# Patient Record
Sex: MALE | Race: WHITE | HISPANIC OR LATINO | Employment: FULL TIME | URBAN - METROPOLITAN AREA
[De-identification: names, ages, dates, MRNs, and addresses within clinical notes are randomized per-mention and may not be internally consistent; named-entity substitution may affect disease eponyms.]

---

## 2017-10-16 ENCOUNTER — NON-PROVIDER VISIT (OUTPATIENT)
Dept: MEDICAL GROUP | Facility: PHYSICIAN GROUP | Age: 47
End: 2017-10-16
Payer: COMMERCIAL

## 2017-10-16 DIAGNOSIS — Z23 NEED FOR INFLUENZA VACCINATION: ICD-10-CM

## 2017-10-16 PROCEDURE — 90471 IMMUNIZATION ADMIN: CPT | Performed by: FAMILY MEDICINE

## 2017-10-16 PROCEDURE — 90686 IIV4 VACC NO PRSV 0.5 ML IM: CPT | Performed by: FAMILY MEDICINE

## 2017-10-16 NOTE — NON-PROVIDER
"Jamie Bosch Jr. is a 47 y.o. male here for a non-provider visit for:   FLU    Reason for immunization: Annual Flu Vaccine  Immunization records indicate need for vaccine: Yes, confirmed with [place] [employer]  Minimum interval has been met for this vaccine: Yes  ABN completed: No    Order and dose verified by:   VIS Dated  08/07/15  was given to patient: Yes  All IAC Questionnaire questions were answered \"No.\"    Patient tolerated injection and no adverse effects were observed or reported: Yes    Pt scheduled for next dose in series: No    "

## 2018-03-05 ENCOUNTER — OFFICE VISIT (OUTPATIENT)
Dept: MEDICAL GROUP | Facility: PHYSICIAN GROUP | Age: 48
End: 2018-03-05
Payer: COMMERCIAL

## 2018-03-05 ENCOUNTER — HOSPITAL ENCOUNTER (OUTPATIENT)
Dept: LAB | Facility: MEDICAL CENTER | Age: 48
End: 2018-03-05
Attending: INTERNAL MEDICINE
Payer: COMMERCIAL

## 2018-03-05 VITALS
HEIGHT: 70 IN | RESPIRATION RATE: 16 BRPM | TEMPERATURE: 97.9 F | BODY MASS INDEX: 28.37 KG/M2 | HEART RATE: 85 BPM | SYSTOLIC BLOOD PRESSURE: 110 MMHG | OXYGEN SATURATION: 96 % | DIASTOLIC BLOOD PRESSURE: 80 MMHG | WEIGHT: 198.2 LBS

## 2018-03-05 DIAGNOSIS — R12 HEART BURN: ICD-10-CM

## 2018-03-05 PROCEDURE — 99213 OFFICE O/P EST LOW 20 MIN: CPT | Performed by: INTERNAL MEDICINE

## 2018-03-05 ASSESSMENT — PATIENT HEALTH QUESTIONNAIRE - PHQ9: CLINICAL INTERPRETATION OF PHQ2 SCORE: 0

## 2018-03-05 NOTE — ASSESSMENT & PLAN NOTE
Has had long standing issues with heart burn for the past 2 months. Has epigastric abdominal pain and the heart burn keeps him up at night. He was on prilosec in the past but just came off of it since he was on it for a long time. Since coming off of prilosec a while ago (doesn't recall when) he was doing well initially. Did take a trip to [place] a year ago and is wondering about H. Pylori. Did have an upper endoscopy about 3-4 years ago and per patient report the study didn't show up. Has a history of duodenal ulcer in 1997. He has been trying to eat smaller meals with more water with some slight relief. Eats dinner at 7 pm and is in bed by 9:30 pm. Doesn't elevate the head of his head. His symptoms are definitely worse at night. Wakes up at midnight nearly every night. He has been taking activated charcoal without any difference. He has been trying to eat lighter dinners. Denies nausea, vomiting, blood in stool or urine.

## 2018-03-05 NOTE — PROGRESS NOTES
"PRIMARY CARE CLINIC FOLLOW UP VISIT  Chief Complaint   Patient presents with   • Heartburn     x 2 months    • RUQ Pain     post gallbladder removal     History of Present Illness     Heart burn  Has had long standing issues with heart burn for the past 2 months. Has epigastric abdominal pain and the heart burn keeps him up at night. He was on prilosec in the past but just came off of it since he was on it for a long time. Since coming off of prilosec a while ago (doesn't recall when) he was doing well initially. Did take a trip to [place] a year ago and is wondering about H. Pylori. Did have an upper endoscopy about 3-4 years ago and per patient report the study didn't show up. Has a history of duodenal ulcer in 1997. He has been trying to eat smaller meals with more water with some slight relief. Eats dinner at 7 pm and is in bed by 9:30 pm. Doesn't elevate the head of his head. His symptoms are definitely worse at night. Wakes up at midnight nearly every night. He has been taking activated charcoal without any difference. He has been trying to eat lighter dinners. Denies nausea, vomiting, blood in stool or urine.     Current Outpatient Prescriptions   Medication Sig Dispense Refill   • Melatonin 5 MG Cap Take  by mouth.     • Misc Natural Products (LUTEIN 20 PO) Take  by mouth.     • Charcoal Activated (CHARCOAL PO) Take  by mouth.     • GREEN COFFEE BEAN PO Take  by mouth.       No current facility-administered medications for this visit.      Past Medical History:   Diagnosis Date   • Bowel habit changes     \"loose stools\" chronic    • GERD (gastroesophageal reflux disease)    • Heart burn    • High cholesterol    • History of duodenal ulcer    • Indigestion      Past Surgical History:   Procedure Laterality Date   • CHARLIE BY LAPAROSCOPY  9/1/2016    Procedure: CHARLIE BY LAPAROSCOPY;  Surgeon: Mike Banda M.D.;  Location: SURGERY [facility];  Service:    • UMBILICAL HERNIA REPAIR  9/1/2016    " "Procedure: UMBILICAL HERNIA REPAIR;  Surgeon: Mike Banda M.D.;  Location: SURGERY [facility];  Service:    • APPENDECTOMY LAPAROSCOPIC N/A 7/23/2015    Procedure: APPENDECTOMY LAPAROSCOPIC;  Surgeon: Mike Banda M.D.;  Location: SURGERY [facility];  Service:    • LIPOMA EXCISION  age 6   • PB REPAIR PERF DUOD/MOLLY ULC-WND/INJ  age 27     Social History   Substance Use Topics   • Smoking status: Former Smoker     Packs/day: 0.20     Types: Cigarettes     Quit date: 1/1/1996   • Smokeless tobacco: Never Used   • Alcohol use 0.0 oz/week      Comment: 1 drink per week      Social History     Social History Narrative   • No narrative on file     Family History   Problem Relation Age of Onset   • Heart Disease Father    • GI Maternal Grandmother      cirrhosis - non-alcoholic   • Diabetes Paternal Grandmother      Family Status   Relation Status   • Father    • Maternal Grandmother    • Paternal Grandmother      Allergies: Neomycin    ROS  As per HPI above. All other systems reviewed and negative.        Objective   Blood pressure 110/80, pulse 85, temperature 36.6 °C (97.9 °F), resp. rate 16, height 1.778 m (5' 10\"), weight 89.9 kg (198 lb 3.2 oz), SpO2 96 %. Body mass index is 28.44 kg/m².    General: alert and oriented, pleasant, cooperative  HEENT: Normocephalic, atraumatic.   Cardiovascular: regular rate and rhythm, normal S1/S2  Pulmonary: lungs clear to auscultation bilaterally  Gastrointestinal: no tenderness to palpation. No hepatosplenomegaly. Bowel sounds normoactive  Skin: warm and dry, no lesions or rashes  Psychiatric: appropriate mood and affect. Good insight and appropriate judgment     Assessment and Plan   The following treatment plan was discussed     1. Heart burn  Counseled patient to space dinner and bedtime out by 3 hours. Instructed him to start omeprazole 20 mg 30 minutes before breakfast for 4-6 weeks and make appointment with GI after that course is complete for possible " repeat endoscopy. Counseled him to pay attention to particular food triggers (he notes bread).   - REFERRAL TO GASTROENTEROLOGY  - H. PYLORI AB, IGM    Healthcare maintenance     There are no preventive care reminders to display for this patient.    Return in about 2 months (around 5/5/2018).    Eric Pereira MD  Internal Medicine  [facility]

## 2018-03-05 NOTE — PATIENT INSTRUCTIONS
· Try over the counter omeprazole 20 mg (taken 30 minutes before breakfast) for 4-6 weeks  · Make an appointment with GI for once your 4-6 weeks of omeprazole is complete to discuss possible endoscopy with them

## 2018-05-29 ENCOUNTER — TELEPHONE (OUTPATIENT)
Dept: MEDICAL GROUP | Facility: PHYSICIAN GROUP | Age: 48
End: 2018-05-29

## 2018-05-29 ENCOUNTER — OFFICE VISIT (OUTPATIENT)
Dept: MEDICAL GROUP | Facility: PHYSICIAN GROUP | Age: 48
End: 2018-05-29
Payer: COMMERCIAL

## 2018-05-29 ENCOUNTER — HOSPITAL ENCOUNTER (OUTPATIENT)
Dept: RADIOLOGY | Facility: MEDICAL CENTER | Age: 48
End: 2018-05-29
Attending: FAMILY MEDICINE
Payer: COMMERCIAL

## 2018-05-29 VITALS
OXYGEN SATURATION: 99 % | RESPIRATION RATE: 16 BRPM | DIASTOLIC BLOOD PRESSURE: 78 MMHG | WEIGHT: 190 LBS | HEART RATE: 92 BPM | SYSTOLIC BLOOD PRESSURE: 112 MMHG | HEIGHT: 70 IN | BODY MASS INDEX: 27.2 KG/M2 | TEMPERATURE: 97.4 F

## 2018-05-29 DIAGNOSIS — M54.16 LUMBAR BACK PAIN WITH RADICULOPATHY AFFECTING RIGHT LOWER EXTREMITY: ICD-10-CM

## 2018-05-29 PROCEDURE — 99214 OFFICE O/P EST MOD 30 MIN: CPT | Performed by: FAMILY MEDICINE

## 2018-05-29 PROCEDURE — 72100 X-RAY EXAM L-S SPINE 2/3 VWS: CPT

## 2018-05-29 RX ORDER — CELECOXIB 200 MG/1
200 CAPSULE ORAL 2 TIMES DAILY
Qty: 60 CAP | Refills: 0 | Status: SHIPPED | OUTPATIENT
Start: 2018-05-29 | End: 2018-07-10 | Stop reason: SDUPTHER

## 2018-05-29 NOTE — TELEPHONE ENCOUNTER
----- Message from Amy Ahmadi M.D. sent at 5/29/2018  9:49 AM PDT -----  Lots of arthritis and some misalignment.  Will await MRI

## 2018-05-29 NOTE — PROGRESS NOTES
"Chief Complaint   Patient presents with   • Back Pain     lower middle back pain x \"years\"       HISTORY OF PRESENT ILLNESS: Patient is a 48 y.o. male established patient here today for the following concerns:    1. Lumbar back pain with radiculopathy affecting right lower extremity  This is a new problem.  Reports years of lower back pain described as aching and sharp in nature with radiation down primarily the right leg made worse by bending forward.  Reports that he has previously done 10 sessions of PT and continues to do his home exercises, but despite this it is worsening.   Now feels occasionally that the right leg is getting weaker.  Denies any injuries or accidents.  Not taking any OTC relievers.        Past Medical, Social, and Family history reviewed and updated in EPIC    Allergies:Neomycin    Current Outpatient Prescriptions   Medication Sig Dispense Refill   • celecoxib (CELEBREX) 200 MG Cap Take 1 Cap by mouth 2 times a day for 30 days. 60 Cap 0   • Misc Natural Products (LUTEIN 20 PO) Take  by mouth.     • Melatonin 5 MG Cap Take  by mouth.     • Charcoal Activated (CHARCOAL PO) Take  by mouth.     • GREEN COFFEE BEAN PO Take  by mouth.       No current facility-administered medications for this visit.          ROS:  Review of Systems   Constitutional: Negative for fever, chills, weight loss and malaise/fatigue.   HENT: Negative for ear pain, nosebleeds, congestion, sore throat and neck pain.    Eyes: Negative for blurred vision.   Respiratory: Negative for cough, sputum production, shortness of breath and wheezing.    Cardiovascular: Negative for chest pain, palpitations,  and leg swelling.   Gastrointestinal: Negative for heartburn, nausea, vomiting, diarrhea and abdominal pain.   Genitourinary: Negative for dysuria, urgency and frequency.   Musculoskeletal: Negative for myalgias, back pain and joint pain.   Skin: Negative for rash and itching.   Neurological: Negative for dizziness, tingling, " "tremors, sensory change, focal weakness and headaches.   Endo/Heme/Allergies: Does not bruise/bleed easily.   Psychiatric/Behavioral: Negative for depression, anxiety, suicidal ideas, insomnia and memory loss.      Exam:  Blood pressure 112/78, pulse 92, temperature 36.3 °C (97.4 °F), resp. rate 16, height 1.778 m (5' 10\"), weight 86.2 kg (190 lb), SpO2 99 %.    General:  Well nourished, well developed in NAD  Head is grossly normal.  Neck: Supple without JVD   Pulmonary:  Normal effort.   Cardiovascular: Regular rate  Extremities: no clubbing, cyanosis, or edema.  Psych: affect appropriate  MSK: no midline tenderness, mild tenderness over the right paraspinous musculature, S/I area.  Negative straight leg testing.      Please note that this dictation was created using voice recognition software. I have made every reasonable attempt to correct obvious errors, but I expect that there are errors of grammar and possibly content that I did not discover before finalizing the note.    Assessment/Plan:  1. Lumbar back pain with radiculopathy affecting right lower extremity  Suspect facet arthropathy causing some of the sciatica L4-5, S1 on the right.  Possibly some SI joint dysfunction.  Will start celebrex for the next month scheduled.  Enlist physiatry for evaluation and given the duration and lack of response to PT will get updated imaging.   - DX-LUMBAR SPINE-2 OR 3 VIEWS; Future  - MR-LUMBAR SPINE-W/O; Future  - REFERRAL TO PHYSIATRY (PMR)  - celecoxib (CELEBREX) 200 MG Cap; Take 1 Cap by mouth 2 times a day for 30 days.  Dispense: 60 Cap; Refill: 0            "

## 2018-05-31 ENCOUNTER — HOSPITAL ENCOUNTER (OUTPATIENT)
Dept: RADIOLOGY | Facility: MEDICAL CENTER | Age: 48
End: 2018-05-31
Attending: FAMILY MEDICINE
Payer: COMMERCIAL

## 2018-05-31 DIAGNOSIS — M54.16 LUMBAR BACK PAIN WITH RADICULOPATHY AFFECTING RIGHT LOWER EXTREMITY: ICD-10-CM

## 2018-05-31 PROCEDURE — 72148 MRI LUMBAR SPINE W/O DYE: CPT

## 2018-06-05 ENCOUNTER — TELEPHONE (OUTPATIENT)
Dept: MEDICAL GROUP | Facility: PHYSICIAN GROUP | Age: 48
End: 2018-06-05

## 2018-06-05 NOTE — TELEPHONE ENCOUNTER
Phone Number Called: [phone] (home)     Message: patient informed    Left Message for patient to call back: no

## 2018-06-05 NOTE — TELEPHONE ENCOUNTER
----- Message from Amy Ahmadi M.D. sent at 6/5/2018  1:02 PM PDT -----  There is narrowing at the L5-S1 areas where the nerves exit.  I'd like to have a physiatry consultation.  Looks like he is already scheduled for 6/21

## 2018-06-21 ENCOUNTER — HOSPITAL ENCOUNTER (OUTPATIENT)
Dept: RADIOLOGY | Facility: MEDICAL CENTER | Age: 48
End: 2018-06-21
Attending: PHYSICAL MEDICINE & REHABILITATION
Payer: COMMERCIAL

## 2018-06-21 ENCOUNTER — OFFICE VISIT (OUTPATIENT)
Dept: PHYSICAL MEDICINE AND REHAB | Facility: MEDICAL CENTER | Age: 48
End: 2018-06-21
Payer: COMMERCIAL

## 2018-06-21 VITALS
DIASTOLIC BLOOD PRESSURE: 86 MMHG | HEART RATE: 78 BPM | WEIGHT: 192 LBS | SYSTOLIC BLOOD PRESSURE: 128 MMHG | OXYGEN SATURATION: 96 % | BODY MASS INDEX: 27.49 KG/M2 | TEMPERATURE: 97.8 F | HEIGHT: 70 IN

## 2018-06-21 DIAGNOSIS — M48.061 FORAMINAL STENOSIS OF LUMBAR REGION: ICD-10-CM

## 2018-06-21 DIAGNOSIS — M43.17 SPONDYLOLISTHESIS OF LUMBOSACRAL REGION: ICD-10-CM

## 2018-06-21 DIAGNOSIS — M47.817 SPONDYLOSIS OF LUMBOSACRAL JOINT: ICD-10-CM

## 2018-06-21 DIAGNOSIS — M47.816 LUMBAR SPONDYLOSIS: ICD-10-CM

## 2018-06-21 PROCEDURE — 72100 X-RAY EXAM L-S SPINE 2/3 VWS: CPT

## 2018-06-21 PROCEDURE — 99205 OFFICE O/P NEW HI 60 MIN: CPT | Performed by: PHYSICAL MEDICINE & REHABILITATION

## 2018-06-21 RX ORDER — PANTOPRAZOLE SODIUM 20 MG/1
TABLET, DELAYED RELEASE ORAL
COMMUNITY
Start: 2018-06-10 | End: 2019-02-20

## 2018-06-21 ASSESSMENT — PAIN SCALES - GENERAL: PAINLEVEL: 4=SLIGHT-MODERATE PAIN

## 2018-06-21 NOTE — PROGRESS NOTES
"New patient note    Physiatry (physical medicine and  Rehabilitation), interventional spine and sports medicine    Date of Service: 6/21/2018    Chief complaint: Low back pain    HISTORY    HPI: Jamie Bosch Jr. 48 y.o. male who presents today for evaluation of his low back and gluteal region pain.  No symptoms that radiate below the gluteal region.  No weakness in the legs.  Morning, he has more stiffness.  This sometimes improves with the day and other times does not.      Celebrex seems to be helpful.  This has been going on for years and was becoming more constant.  This seems to be more intermittent than previously.     Symptoms are worse bending forward.  He does have some relief with bending backward.  Pain is worse with coughing and sneezing makes his symptoms worse.     He does some stretches.  One, a scissor-legged standing toe touch.  He has used an inversion table, not sure if this helps and he has a limited tolerance of hanging upside down, maybe two minutes.    Medical records review:  I reviewed the note from the referring provider Amy Ahmadi M.D. Dated 05/29/2018     Previous treatments:    Physical Therapy: Yes    Medications the patient is tried: NSAIDs, tylenol in the past for about 2 months    Previous interventions: none    Previous surgeries to relieve the above pain:  none      ROS:   GI: h/o duodenal ulcer, appendectomy, cholecystectomy    Red Flags ROS:   Fever, Chills, Sweats: Denies  Involuntary Weight Loss: Denies  Bladder Incontinence: Denies  Bowel Incontinence: denies  Saddle Anesthesia: Denies    All other systems reviewed and negative.       PMHx:   Past Medical History:   Diagnosis Date   • Bowel habit changes     \"loose stools\" chronic    • GERD (gastroesophageal reflux disease)    • Heart burn    • High cholesterol    • History of duodenal ulcer    • Indigestion        PSHx:   Past Surgical History:   Procedure Laterality Date   • CHARLIE BY LAPAROSCOPY  9/1/2016    Procedure: " CHARLIE BY LAPAROSCOPY;  Surgeon: Mike Banda M.D.;  Location: SURGERY [facility];  Service:    • UMBILICAL HERNIA REPAIR  9/1/2016    Procedure: UMBILICAL HERNIA REPAIR;  Surgeon: Mike Banda M.D.;  Location: SURGERY [facility];  Service:    • APPENDECTOMY LAPAROSCOPIC N/A 7/23/2015    Procedure: APPENDECTOMY LAPAROSCOPIC;  Surgeon: Mike Banda M.D.;  Location: SURGERY [facility];  Service:    • LIPOMA EXCISION  age 6   • PB REPAIR PERF DUOD/MOLLY ULC-WND/INJ  age 27       Family history   Family History   Problem Relation Age of Onset   • Heart Disease Father    • GI Maternal Grandmother      cirrhosis - non-alcoholic   • Diabetes Paternal Grandmother        Medications:   Current Outpatient Prescriptions   Medication   • pantoprazole (PROTONIX) 20 MG tablet   • celecoxib (CELEBREX) 200 MG Cap   • Misc Natural Products (LUTEIN 20 PO)   • GREEN COFFEE BEAN PO   • Melatonin 5 MG Cap   • Charcoal Activated (CHARCOAL PO)     No current facility-administered medications for this visit.        Allergies:   Allergies   Allergen Reactions   • Neomycin Swelling       Social Hx:   Social History     Social History   • Marital status:      Spouse name: N/A   • Number of children: N/A   • Years of education: N/A     Occupational History   • Not on file.     Social History Main Topics   • Smoking status: Former Smoker     Packs/day: 0.20     Types: Cigarettes     Quit date: 1/1/1996   • Smokeless tobacco: Never Used   • Alcohol use 0.0 oz/week      Comment: 2 drink per week    • Drug use: No   • Sexual activity: Yes     Other Topics Concern   •  Service No   • Blood Transfusions Yes   • Caffeine Concern No   • Occupational Exposure Yes   • Hobby Hazards No   • Sleep Concern No   • Stress Concern No   • Weight Concern No   • Special Diet No   • Back Care Yes   • Exercise Yes   • Bike Helmet Yes   • Seat Belt Yes   • Self-Exams Yes     Social History Narrative   • No narrative on file  "        EXAMINATION     Physical Exam:   Vitals: Blood pressure 128/86, pulse 78, temperature 36.6 °C (97.8 °F), height 1.778 m (5' 10\"), weight 87.1 kg (192 lb), SpO2 96 %.    Constitutional:   Body Habitus: Body mass index is 27.55 kg/m².  Cooperation: Fully cooperates with exam  Appearance: Well-groomed, well-nourished, not disheveled, in no acute distress    Eyes: No scleral icterus to suggest severe liver disease, no proptosis to suggest severe hyperthyroid    ENT -no obvious auditory deficits, no facial droop     Skin -no rashes or lesions noted     Respiratory-  breathing comfortable on room air, no audible wheezing    Cardiovascular- No lower extremity edema is noted.     Psychiatric- alert and oriented ×3. Normal affect.     Gait - normal gait, no use of ambulatory device, nonantalgic. the patient can toe walk with ease. the patient can heel walk with ease.    Musculoskeletal -   Cervical spine   Inspection: No deformities of the skin over the cervical spine. No rashes or lesions.    full  A/P ROM in all directions, without  pain      No signs of muscular atrophy in bilateral upper extremities     Thoracic/Lumbar Spine/Sacral Spine/Hips   Inspection: No evidence of atrophy in bilateral lower extremities throughout     ROM: full  AROM with flexion, extension, lateral flexion, and rotation bilaterally, with pain slightly relieved pain on the right with lateral side bending to the left.  Extension and rotation on the right is uncomfortable.    Palpation:   No tenderness to palpation in midline at T1-T12 levels. No tenderness to palpation in the left and right of the midline T1-L5  palpation over SI joint: negative bilaterally    palpation over buttock: negative bilaterally    palpation in hip or over the greater trochanters: negative bilaterally      Lumbar spine Special tests  Neuro tension  Straight leg test negative bilaterally      Note of hamstring tightness bilaterally, mild hip flexor contractions, " quadriceps are flexible.      HIP  FAIR test negative bilaterally for pain, but reveals tightness   Range of motion in the hips is within normal limits in internal rotation, external rotation.     SI joint tests  Observation patient sits on one buttocks: Negative  SUE test negative bilaterally       Neuro       Key points for the international standards for neurological classification of spinal cord injury (ISNCSCI) to light touch.     Dermatome R L   C4 2  2   C5 2 2   C6 2 2   C7 2 2   C8 2 2   T1 2 2   T2 2 2   L2 2 2   L3 2 2   L4 2 2   L5 2 2   S1 2 2   S2 2 2           Motor Exam Upper Extremities   ? Myotome R L   Shoulder flexion C5  5 5   Elbow flexion C5 5 5   Wrist extension C6 5 5   Elbow extension C7 5 5   Finger flexion C8 5 5   Finger abduction T1 5 5         Motor Exam Lower Extremities    ? Myotome R L   Hip flexion L2  5 5   Knee extension L3 5 5   Ankle dorsiflexion L4 5 5   Toe extension L5 5 5   Ankle plantarflexion S1 5 5       Sloan’s sign negative bilaterally   Babinski sign negative bilaterally   Clonus of the ankle negative bilaterally     Reflexes  ?  R L   Biceps  2+  2+   Brachioradialis  2+ 2+   Patella  2+ 2+   Achilles   2+ 2+       MEDICAL DECISION MAKING    Medical records review: see under HPI section.     DATA    Labs:   Lab Results   Component Value Date/Time    SODIUM 134 (L) 08/22/2016 11:48 AM    POTASSIUM 3.9 08/22/2016 11:48 AM    CHLORIDE 101 08/22/2016 11:48 AM    CO2 26 08/22/2016 11:48 AM    ANION 7.0 08/22/2016 11:48 AM    GLUCOSE 114 (H) 08/22/2016 11:48 AM    BUN 17 08/22/2016 11:48 AM    CREATININE 1.29 08/22/2016 11:48 AM    CALCIUM 10.0 08/22/2016 11:48 AM    ASTSGOT 18 06/08/2016 10:27 AM    ALTSGPT 40 06/08/2016 10:27 AM    TBILIRUBIN 1.0 06/08/2016 10:27 AM    ALBUMIN 4.5 06/08/2016 10:27 AM    TOTPROTEIN 7.5 06/08/2016 10:27 AM    GLOBULIN 3.4 03/08/2016 11:42 AM    AGRATIO 1.3 03/08/2016 11:42 AM        Lab Results   Component Value Date/Time    WBC 13.6  (H) 08/22/2016 11:48 AM    RBC 4.98 08/22/2016 11:48 AM    HEMOGLOBIN 14.6 08/22/2016 11:48 AM    HEMATOCRIT 43.3 08/22/2016 11:48 AM    MCV 86.9 08/22/2016 11:48 AM    MCH 29.3 08/22/2016 11:48 AM    MCHC 33.7 08/22/2016 11:48 AM    MPV 11.3 08/22/2016 11:48 AM    NEUTSPOLYS 79.10 (H) 07/24/2015 03:17 AM    LYMPHOCYTES 8.40 (L) 07/24/2015 03:17 AM    MONOCYTES 11.10 07/24/2015 03:17 AM    EOSINOPHILS 0.70 07/24/2015 03:17 AM    BASOPHILS 0.10 07/24/2015 03:17 AM    HYPOCHROMIA 1+ 01/09/2013 12:00 PM        Lab Results   Component Value Date/Time    HBA1C 5.7 (H) 03/08/2016 11:42 AM        Imaging: I personally reviewed following images, these are my reads  Xray lumbar spine 05/29/2018  There are degenerative changes noted in the lumbar spine.  There is decreased disc height at L5-1, particularly.  There is note of retrolisthesis at L5-S1, L4-5, L3-4, and L2-3.     MRI lumbar spine 05/31/2018  Reviewed this and the xrays with Mr. Bosch at the time of the visit.  Moderate foraminal narrowing at L5-S1 with diffuse disc bulge at this level.  Disc desiccation is noted in the lower three levels of the lumbar spine.    Facet arthropathy and ligamentum flavum thickening at L4-5, L5-S1 and L3-4, lesser at L2-3 and L1-2      IMAGING radiology reads. I reviewed the following radiology reads                                          Results for orders placed during the hospital encounter of 05/31/18   MR-LUMBAR SPINE-W/O    Impression Moderate degenerative change of the lumbar spine. No spinal canal narrowing.    Moderate neuroforaminal narrowing at L5-S1 bilaterally.                                                                                                   Results for orders placed during the hospital encounter of 05/29/18   DX-LUMBAR SPINE-2 OR 3 VIEWS    Impression Worsening degenerative change of the lumbar spine.    Minimal retrolisthesis of L2-3, L3-4, L4-5 and L5-S1, slightly more than prior.                                            Diagnosis   Visit Diagnoses     ICD-10-CM   1. Spondylolisthesis of lumbosacral region M43.17   2. Lumbar spondylosis M47.816   3. Spondylosis of lumbosacral joint M47.817   4. Foraminal stenosis of lumbar region M99.83           ASSESSMENT:  Jamie Bosch Jr. 48 y.o. male presents for evaluation of his lumbar spine pain.     Jamie was seen today for new patient.    Diagnoses and all orders for this visit:    Spondylolisthesis of lumbosacral region  -     Cancel: DX-LUMBAR SPINE-4+ VIEWS  -     DX-LUMBAR SPINE-2 OR 3 VIEWS    Lumbar spondylosis  -     REFERRAL TO PHYSIATRY (PMR)    Spondylosis of lumbosacral joint  -     REFERRAL TO PHYSIATRY (PMR)    Foraminal stenosis of lumbar region    Lumbar spondylosis noted. This facet arthropathy could account for his symptoms.  We discussed that he also has foraminal stenosis.  Given this, we discussed risks, benefits and expectations of performing right L3-5 medial branch blocks.  Discussed diagnostic and therapeutic blocks. If this is successful, will progress to a second block as needed and possibly rhizotomy.  If not successful, we will consider transforaminal epidural steroid injections.  He is agreeable.    We discussed findings on his imaging studies.  Check flexion and extension to assess the retrolisthesis and stability.  Given the foraminal stenosis on MRI, this could contribute to his symptoms.    Reviewed his stretches.  Advised that he stop the standing stretch and reviewed hamstring stretches, lumbar spinal twists as well as prone stretches to add to his routine.  Inversion table could be beneficial at a less extreme angle.    Discussed trial of turmeric up to 1500mg/day in divided doses.  Advised he look for USP or GMP label and that this would need to be stopped prior to injections.      Follow-up:For injections and 2 week followup    Thank you very much for asking me to participate in Jamie Bosch's care.  Please contact me with any  questions or concerns    Earl Levin MD  Physical Medicine and Rehabilitation  Interventional Spine and Sports Physiatry  [facility]        CC Amy Ahmadi M.D.

## 2018-06-22 ENCOUNTER — TELEPHONE (OUTPATIENT)
Dept: PHYSICAL MEDICINE AND REHAB | Facility: MEDICAL CENTER | Age: 48
End: 2018-06-22

## 2018-07-10 DIAGNOSIS — M54.16 LUMBAR BACK PAIN WITH RADICULOPATHY AFFECTING RIGHT LOWER EXTREMITY: ICD-10-CM

## 2018-07-11 ENCOUNTER — TELEPHONE (OUTPATIENT)
Dept: MEDICAL GROUP | Facility: PHYSICIAN GROUP | Age: 48
End: 2018-07-11

## 2018-07-11 RX ORDER — CELECOXIB 200 MG/1
200 CAPSULE ORAL 2 TIMES DAILY
Qty: 60 CAP | Refills: 0 | Status: SHIPPED | OUTPATIENT
Start: 2018-07-11 | End: 2018-08-10 | Stop reason: SDUPTHER

## 2018-07-12 NOTE — TELEPHONE ENCOUNTER
DOCUMENTATION OF PAR STATUS:    1. Name of Medication & Dose: celecoxib     2. Name of Prescription Coverage Company & phone #: [employer]    3. Date Prior Auth Submitted: 07/11/18    4. What information was given to obtain insurance decision? Cover My Meds    5. Prior Auth Status? Pending    6. Patient Notified: no

## 2018-07-12 NOTE — TELEPHONE ENCOUNTER
FINAL PRIOR AUTHORIZATION STATUS:    1.  Name of Medication & Dose: celecoxib 200 mg caps     2. Prior Auth Status: Approved through 07/31/2019     3. Action Taken: Pharmacy Notified: yes Patient Notified: no

## 2018-07-24 NOTE — PROGRESS NOTES
PAT note: PAT call made 07/24/2018 at 1110. Spoke with Jamie. Health history, allergies, medications and pre-procedure instructions reviewed with patient.Pt verbalized understanding of instructions. Pt requesting sedation for this procedure.

## 2018-07-25 ENCOUNTER — HOSPITAL ENCOUNTER (OUTPATIENT)
Dept: RADIOLOGY | Facility: REHABILITATION | Age: 48
End: 2018-07-25
Attending: PHYSICAL MEDICINE & REHABILITATION
Payer: COMMERCIAL

## 2018-07-25 ENCOUNTER — HOSPITAL ENCOUNTER (OUTPATIENT)
Dept: PAIN MANAGEMENT | Facility: REHABILITATION | Age: 48
End: 2018-07-25
Attending: PHYSICAL MEDICINE & REHABILITATION
Payer: COMMERCIAL

## 2018-07-25 VITALS
OXYGEN SATURATION: 98 % | DIASTOLIC BLOOD PRESSURE: 86 MMHG | HEART RATE: 71 BPM | RESPIRATION RATE: 16 BRPM | SYSTOLIC BLOOD PRESSURE: 126 MMHG | TEMPERATURE: 98.1 F | HEIGHT: 71 IN | WEIGHT: 190.7 LBS | BODY MASS INDEX: 26.7 KG/M2

## 2018-07-25 PROCEDURE — 64493 INJ PARAVERT F JNT L/S 1 LEV: CPT

## 2018-07-25 PROCEDURE — 700117 HCHG RX CONTRAST REV CODE 255

## 2018-07-25 PROCEDURE — 64494 INJ PARAVERT F JNT L/S 2 LEV: CPT

## 2018-07-25 PROCEDURE — 700111 HCHG RX REV CODE 636 W/ 250 OVERRIDE (IP)

## 2018-07-25 PROCEDURE — 700101 HCHG RX REV CODE 250

## 2018-07-25 RX ORDER — LIDOCAINE HYDROCHLORIDE 20 MG/ML
INJECTION, SOLUTION EPIDURAL; INFILTRATION; INTRACAUDAL; PERINEURAL
Status: COMPLETED
Start: 2018-07-25 | End: 2018-07-25

## 2018-07-25 RX ORDER — METHYLPREDNISOLONE ACETATE 40 MG/ML
INJECTION, SUSPENSION INTRA-ARTICULAR; INTRALESIONAL; INTRAMUSCULAR; SOFT TISSUE
Status: COMPLETED
Start: 2018-07-25 | End: 2018-07-25

## 2018-07-25 RX ADMIN — LIDOCAINE HYDROCHLORIDE 4 ML: 20 INJECTION, SOLUTION EPIDURAL; INFILTRATION; INTRACAUDAL; PERINEURAL at 08:07

## 2018-07-25 RX ADMIN — METHYLPREDNISOLONE ACETATE 40 MG: 40 INJECTION, SUSPENSION INTRA-ARTICULAR; INTRALESIONAL; INTRAMUSCULAR; SOFT TISSUE at 08:07

## 2018-07-25 RX ADMIN — IOHEXOL 2 ML: 240 INJECTION, SOLUTION INTRATHECAL; INTRAVASCULAR; INTRAVENOUS; ORAL at 08:05

## 2018-07-25 ASSESSMENT — PAIN SCALES - GENERAL
PAINLEVEL_OUTOF10: 0
PAINLEVEL_OUTOF10: 3
PAINLEVEL_OUTOF10: 0

## 2018-07-25 NOTE — PROCEDURES
Date of Service: 7/25/2018    Physician/s: Earl Levin MD    Pre-operative Diagnosis: Lumbar spondylosis, spondylosis of lumbosacral joint    Post-operative Diagnosis: Lumbar spondylosis, spondylosis of lumbosacral joint     Procedure: Medial Branch Blocks targeting the right lumbar three and lumbar four medial branch blocks and right lumbar five dorsal ramus block    Description of procedure:    The risks, benefits, and alternatives of the procedure were reviewed and discussed with the patient.  Written informed consent was freely obtained. A pre-procedural time-out was conducted by the physician verifying patient’s identity, procedure to be performed, procedure site and side, and allergy verification. Appropriate equipment was determined to be in place for the procedure.       In the fluoroscopy suite the patient was placed in a prone position and the skin was prepped and draped in the usual sterile fashion. The fluoroscope was placed over the lower back at the appropriate angles, and the targets for injection were marked. A 25g needle was placed into each of the markings at the three levels, and approx 1cc of 1% Lidocaine was injected subcutaneously into the epidermal and dermal layers. The needle was removed. A 25g 3.5 inch needle was then placed at the intersection of the transverse process and superior articular process at the right lumbar five dorsal ramus nerve  The needle tips were then verified by oblique view, less than 1 cc of contrast dye was used to highlight the medial branch while the fluoroscope was running live. Following negative aspiration, approx 1.25cc of solution containing 1.0cc depomedrol (40mg) and 3.0cc of 2% lidocaine was then injected at the above levels, and the needles were removed intact.     A 25g 3.5 inch needle was then placed at the intersection of the transverse process and superior articular process at the right lumbar four medial branch location.  The needle tips were then  verified by oblique view, less than 1 cc of contrast dye was used to highlight the medial branch while the fluoroscope was running live. Following negative aspiration, approx 1.25cc of solution containing 1.0cc depomedrol (40mg) and 3.0cc of 2% lidocaine was then injected at the above levels, and the needles were removed intact.    A 25g 3.5 inch needle was then placed at the intersection of the transverse process and superior articular process at the right lumbar three medial branch location.  The needle tips were then verified by oblique view, less than 1 cc of contrast dye was used to highlight the medial branch while the fluoroscope was running live. Following negative aspiration, approx 1.25cc of solution containing 1.0cc depomedrol (40mg) and 3.0cc of 2% lidocaine was then injected at the above levels, and the needles were removed intact.    The patient's back was covered with a 4x4 gauze, the area was cleansed with sterile normal saline, and a dressing was applied.     There were no complications noted, the patient was hemodynamically stable, and tolerated the procedure well.   He reports pain has decreased and we will send him out with a sheet to track symptoms today.    Earl Levin MD  Physical Medicine and Rehabilitation  Interventional Spine and Sports Physiatry  [facility]  7/25/2018  8:12 AM

## 2018-07-25 NOTE — H&P
"Physiatry (physical medicine and  Rehabilitation), interventional spine and sports medicine     Date of Service: 07/25/2018     Chief complaint: Low back pain     HISTORY     HPI: Jamie Bosch Jr. 48 y.o. male who presents today for evaluation of his low back and gluteal region pain.  No symptoms that radiate below the gluteal region.  No weakness in the legs.  Morning, he has more stiffness.  This sometimes improves with the day and other times does not.       Celebrex seems to be helpful.  This has been going on for years and was becoming more constant.  This seems to be more intermittent than previously.     Symptoms are worse bending forward.  He does have some relief with bending backward.  Pain is worse with coughing and sneezing makes his symptoms worse.     He does some stretches.  One, a scissor-legged standing toe touch.  He has used an inversion table, not sure if this helps and he has a limited tolerance of hanging upside down, maybe two minutes.    Pain is a 3/10 on the VAS.     Medical records review:  I reviewed the note from the referring provider Amy Ahmadi M.D. Dated 05/29/2018      Previous treatments:     Physical Therapy: Yes     Medications the patient is tried: NSAIDs, tylenol in the past for about 2 months     Previous interventions: none     Previous surgeries to relieve the above pain:  none     ROS:   GI: h/o duodenal ulcer, appendectomy, cholecystectomy     Red Flags ROS:   Fever, Chills, Sweats: Denies  Involuntary Weight Loss: Denies  Bladder Incontinence: Denies  Bowel Incontinence: denies  Saddle Anesthesia: Denies     All other systems reviewed and negative.        PMHx:   Past Medical History        Past Medical History:   Diagnosis Date   • Bowel habit changes       \"loose stools\" chronic    • GERD (gastroesophageal reflux disease)     • Heart burn     • High cholesterol     • History of duodenal ulcer     • Indigestion              PSHx:   Past Surgical History         Past " Surgical History:   Procedure Laterality Date   • CHARLIE BY LAPAROSCOPY   9/1/2016     Procedure: CHARLIE BY LAPAROSCOPY;  Surgeon: Mike Banda M.D.;  Location: SURGERY [facility];  Service:    • UMBILICAL HERNIA REPAIR   9/1/2016     Procedure: UMBILICAL HERNIA REPAIR;  Surgeon: Mike Banda M.D.;  Location: SURGERY [facility];  Service:    • APPENDECTOMY LAPAROSCOPIC N/A 7/23/2015     Procedure: APPENDECTOMY LAPAROSCOPIC;  Surgeon: Mike Banda M.D.;  Location: SURGERY [facility];  Service:    • LIPOMA EXCISION   age 6   • PB REPAIR PERF DUOD/MOLLY ULC-WND/INJ   age 27            Family history   Family History          Family History   Problem Relation Age of Onset   • Heart Disease Father     • GI Maternal Grandmother         cirrhosis - non-alcoholic   • Diabetes Paternal Grandmother              Medications:       Current Outpatient Prescriptions   Medication   • pantoprazole (PROTONIX) 20 MG tablet   • celecoxib (CELEBREX) 200 MG Cap   • Misc Natural Products (LUTEIN 20 PO)   • GREEN COFFEE BEAN PO   • Melatonin 5 MG Cap   • Charcoal Activated (CHARCOAL PO)      No current facility-administered medications for this visit.          Allergies:        Allergies   Allergen Reactions   • Neomycin Swelling         Social Hx:   Social History   Social History            Social History   • Marital status:        Spouse name: N/A   • Number of children: N/A   • Years of education: N/A          Occupational History   • Not on file.             Social History Main Topics   • Smoking status: Former Smoker       Packs/day: 0.20       Types: Cigarettes       Quit date: 1/1/1996   • Smokeless tobacco: Never Used   • Alcohol use 0.0 oz/week         Comment: 2 drink per week    • Drug use: No   • Sexual activity: Yes           Other Topics Concern   •  Service No   • Blood Transfusions Yes   • Caffeine Concern No   • Occupational Exposure Yes   • Hobby Hazards No   • Sleep Concern No    • Stress Concern No   • Weight Concern No   • Special Diet No   • Back Care Yes   • Exercise Yes   • Bike Helmet Yes   • Seat Belt Yes   • Self-Exams Yes          Social History Narrative   • No narrative on file               EXAMINATION      Physical Exam:   Vitals: Blood pressure 124/87, pulse 62, temperature  98°F, SpO2 98 %.     Constitutional:   Body Habitus: Body mass index is 27.55 kg/m².  Cooperation: Fully cooperates with exam  Appearance: Well-groomed, well-nourished, not disheveled, in no acute distress     Eyes: No scleral icterus to suggest severe liver disease, no proptosis to suggest severe hyperthyroid     ENT -no obvious auditory deficits, no facial droop      Skin -no rashes or lesions noted      Respiratory-  breathing comfortable on room air, no audible wheezing CTA Bilaterally     Cardiovascular- No lower extremity edema is noted. CV: RRR, nl S1, S2      Psychiatric- alert and oriented ×3. Normal affect.      Gait - normal gait, no use of ambulatory device, nonantalgic. the patient can toe walk with ease. the patient can heel walk with ease.     Musculoskeletal -   Cervical spine   Inspection: No deformities of the skin over the cervical spine. No rashes or lesions.     full  A/P ROM in all directions, without  pain       No signs of muscular atrophy in bilateral upper extremities      Thoracic/Lumbar Spine/Sacral Spine/Hips   Inspection: No evidence of atrophy in bilateral lower extremities throughout      ROM: full  AROM with flexion, extension, lateral flexion, and rotation bilaterally, with pain slightly relieved pain on the right with lateral side bending to the left.  Extension and rotation on the right is uncomfortable.     Palpation:   No tenderness to palpation in midline at T1-T12 levels. No tenderness to palpation in the left and right of the midline T1-L5  palpation over SI joint: negative bilaterally    palpation over buttock: negative bilaterally    palpation in hip or over the  greater trochanters: negative bilaterally       Lumbar spine Special tests  Neuro tension  Straight leg test negative bilaterally       Note of hamstring tightness bilaterally, mild hip flexor contractions, quadriceps are flexible.       HIP  FAIR test negative bilaterally for pain, but reveals tightness   Range of motion in the hips is within normal limits in internal rotation, external rotation.      SI joint tests  Observation patient sits on one buttocks: Negative  SUE test negative bilaterally         Neuro         Key points for the international standards for neurological classification of spinal cord injury (ISNCSCI) to light touch.      Dermatome R L   C4 2  2   C5 2 2   C6 2 2   C7 2 2   C8 2 2   T1 2 2   T2 2 2   L2 2 2   L3 2 2   L4 2 2   L5 2 2   S1 2 2   S2 2 2               Motor Exam Upper Extremities   ? Myotome R L   Shoulder flexion C5  5 5   Elbow flexion C5 5 5   Wrist extension C6 5 5   Elbow extension C7 5 5   Finger flexion C8 5 5   Finger abduction T1 5 5            Motor Exam Lower Extremities     ? Myotome R L   Hip flexion L2  5 5   Knee extension L3 5 5   Ankle dorsiflexion L4 5 5   Toe extension L5 5 5   Ankle plantarflexion S1 5 5        Sloan’s sign negative bilaterally   Babinski sign negative bilaterally   Clonus of the ankle negative bilaterally      Reflexes  ?   R L   Biceps   2+  2+   Brachioradialis   2+ 2+   Patella   2+ 2+   Achilles    2+ 2+         MEDICAL DECISION MAKING     Medical records review: see under HPI section.      DATA     Labs:         Lab Results   Component Value Date/Time     SODIUM 134 (L) 08/22/2016 11:48 AM     POTASSIUM 3.9 08/22/2016 11:48 AM     CHLORIDE 101 08/22/2016 11:48 AM     CO2 26 08/22/2016 11:48 AM     ANION 7.0 08/22/2016 11:48 AM     GLUCOSE 114 (H) 08/22/2016 11:48 AM     BUN 17 08/22/2016 11:48 AM     CREATININE 1.29 08/22/2016 11:48 AM     CALCIUM 10.0 08/22/2016 11:48 AM     ASTSGOT 18 06/08/2016 10:27 AM     ALTSGPT 40 06/08/2016  10:27 AM     TBILIRUBIN 1.0 06/08/2016 10:27 AM     ALBUMIN 4.5 06/08/2016 10:27 AM     TOTPROTEIN 7.5 06/08/2016 10:27 AM     GLOBULIN 3.4 03/08/2016 11:42 AM     AGRATIO 1.3 03/08/2016 11:42 AM               Lab Results   Component Value Date/Time     WBC 13.6 (H) 08/22/2016 11:48 AM     RBC 4.98 08/22/2016 11:48 AM     HEMOGLOBIN 14.6 08/22/2016 11:48 AM     HEMATOCRIT 43.3 08/22/2016 11:48 AM     MCV 86.9 08/22/2016 11:48 AM     MCH 29.3 08/22/2016 11:48 AM     MCHC 33.7 08/22/2016 11:48 AM     MPV 11.3 08/22/2016 11:48 AM     NEUTSPOLYS 79.10 (H) 07/24/2015 03:17 AM     LYMPHOCYTES 8.40 (L) 07/24/2015 03:17 AM     MONOCYTES 11.10 07/24/2015 03:17 AM     EOSINOPHILS 0.70 07/24/2015 03:17 AM     BASOPHILS 0.10 07/24/2015 03:17 AM     HYPOCHROMIA 1+ 01/09/2013 12:00 PM               Lab Results   Component Value Date/Time     HBA1C 5.7 (H) 03/08/2016 11:42 AM         Imaging: I personally reviewed following images, these are my reads  Xray lumbar spine 05/29/2018  There are degenerative changes noted in the lumbar spine.  There is decreased disc height at L5-1, particularly.  There is note of retrolisthesis at L5-S1, L4-5, L3-4, and L2-3.      MRI lumbar spine 05/31/2018  Reviewed this and the xrays with Mr. Bosch at the time of the visit.  Moderate foraminal narrowing at L5-S1 with diffuse disc bulge at this level.  Disc desiccation is noted in the lower three levels of the lumbar spine.     Facet arthropathy and ligamentum flavum thickening at L4-5, L5-S1 and L3-4, lesser at L2-3 and L1-2        IMAGING radiology reads. I reviewed the following radiology reads                                               Results for orders placed during the hospital encounter of 05/31/18   MR-LUMBAR SPINE-W/O     Impression Moderate degenerative change of the lumbar spine. No spinal canal narrowing.     Moderate neuroforaminal narrowing at L5-S1 bilaterally.                                                                                                         Results for orders placed during the hospital encounter of 05/29/18   DX-LUMBAR SPINE-2 OR 3 VIEWS     Impression Worsening degenerative change of the lumbar spine.     Minimal retrolisthesis of L2-3, L3-4, L4-5 and L5-S1, slightly more than prior.                                            Diagnosis        Visit Diagnoses       ICD-10-CM   1. Spondylolisthesis of lumbosacral region M43.17   2. Lumbar spondylosis M47.816   3. Spondylosis of lumbosacral joint M47.817   4. Foraminal stenosis of lumbar region M99.83               ASSESSMENT:  Jamie Bosch Jr. 48 y.o. male presents for evaluation of his lumbar spine pain.     Jamie was seen today for new patient.     Diagnoses and all orders for this visit:     Spondylolisthesis of lumbosacral region  -     Cancel: DX-LUMBAR SPINE-4+ VIEWS  -     DX-LUMBAR SPINE-2 OR 3 VIEWS     Lumbar spondylosis  -     REFERRAL TO PHYSIATRY (PMR)     Spondylosis of lumbosacral joint  -     REFERRAL TO PHYSIATRY (PMR)     Foraminal stenosis of lumbar region     Lumbar spondylosis noted. This facet arthropathy could account for his symptoms.  We discussed that he also has foraminal stenosis.  Given this, we discussed risks, benefits and expectations of performing right L3-5 medial branch blocks.  Discussed diagnostic and therapeutic blocks. If this is successful, will progress to a second block as needed and possibly rhizotomy.  If not successful, we will consider transforaminal epidural steroid injections.  He is agreeable.     We discussed findings on his imaging studies.  Check flexion and extension to assess the retrolisthesis and stability.  Given the foraminal stenosis on MRI, this could contribute to his symptoms.     Reviewed his stretches.  Advised that he stop the standing stretch and reviewed hamstring stretches, lumbar spinal twists as well as prone stretches to add to his routine.  Inversion table could be beneficial at a less extreme  miri.     Discussed trial of turmeric up to 1500mg/day in divided doses.  Advised he look for USP or GMP label and that this would need to be stopped prior to injections.     H&P updated on day of procedure 07/25/2018.  Plan to proceed with right L3-5 medial branch blocks.     Follow-up:For injections and 2 week followup     Thank you very much for asking me to participate in Jamie Danitza's care.  Please contact me with any questions or concerns     Earl Levin MD  Physical Medicine and Rehabilitation  Interventional Spine and Sports Physiatry  [facility]

## 2018-07-25 NOTE — PROGRESS NOTES
Current meds. See medication reconciliation form. Reviewed with pt. Pt denies taking ASA,other blood thinners or anti-inflammatories. Pt has a ride post-procedure (wife, Jessica is ). Printed and verbal discharge instructions given to pt who verbalized understanding.

## 2018-07-25 NOTE — PROGRESS NOTES
Timeout : allergies, pertinent medical history, site & procedure. Positioned patient by VI RN, X - ray Tech. Both lower legs & feet pillow placed for support. Hands supported on stool under head of the bed. Procedure tolerated well by patient. Accompanied to recovery room, ambulatory.

## 2018-08-07 ENCOUNTER — OFFICE VISIT (OUTPATIENT)
Dept: PHYSICAL MEDICINE AND REHAB | Facility: MEDICAL CENTER | Age: 48
End: 2018-08-07
Payer: COMMERCIAL

## 2018-08-07 VITALS
HEART RATE: 82 BPM | WEIGHT: 192.68 LBS | BODY MASS INDEX: 26.98 KG/M2 | OXYGEN SATURATION: 96 % | HEIGHT: 71 IN | SYSTOLIC BLOOD PRESSURE: 110 MMHG | TEMPERATURE: 97.6 F | DIASTOLIC BLOOD PRESSURE: 80 MMHG

## 2018-08-07 DIAGNOSIS — M43.17 SPONDYLOLISTHESIS OF LUMBOSACRAL REGION: ICD-10-CM

## 2018-08-07 DIAGNOSIS — M54.50 RIGHT-SIDED LOW BACK PAIN WITHOUT SCIATICA, UNSPECIFIED CHRONICITY: ICD-10-CM

## 2018-08-07 DIAGNOSIS — M47.816 LUMBAR SPONDYLOSIS: ICD-10-CM

## 2018-08-07 DIAGNOSIS — M47.817 SPONDYLOSIS OF LUMBOSACRAL JOINT: ICD-10-CM

## 2018-08-07 PROCEDURE — 99213 OFFICE O/P EST LOW 20 MIN: CPT | Performed by: PHYSICAL MEDICINE & REHABILITATION

## 2018-08-07 ASSESSMENT — PAIN SCALES - GENERAL: PAINLEVEL: 8=MODERATE-SEVERE PAIN

## 2018-08-07 NOTE — PROGRESS NOTES
"Follow up patient note  Pain Medicine, Interventional spine and sports physiatry, Physical medicine rehabilitation      Chief complaint: Right-sided low back pain      HISTORY    Please see new patient note dated 06/21/2018 by Dr Levin,  for more details.     HPI  Patient identification: Jamie Bosch Jr. 48 y.o. male who returns for follow-up after right medial branch blocks, L3-5.    Interval history: Mr. Bosch returns for follow-up after his right medial branch blocks L3-5 on 07/25/2018.  He reports that he his low back pain was better for the first day after the injection.  His pain was totally gone and felt pretty good even the next day.  Currently, his pain is back to the usual level of pain.  He has pain that is primarily right-sided.  No pain radiates below the buttock.    In the morning, he performs his stretches and he feels stiffest in the morning.       ROS Red Flags :  Fever, Chills, Sweats: Denies  Involuntary Weight Loss: Denies  Bowel/Bladder Incontinence: Denies  Saddle Anesthesia: Denies        PMHx:   Past Medical History:   Diagnosis Date   • Bowel habit changes     \"loose stools\" chronic    • GERD (gastroesophageal reflux disease)    • Heart burn    • High cholesterol    • History of duodenal ulcer    • Indigestion        PSHx:   Past Surgical History:   Procedure Laterality Date   • CHARLIE BY LAPAROSCOPY  9/1/2016    Procedure: CHARLIE BY LAPAROSCOPY;  Surgeon: Mike Banda M.D.;  Location: [facility];  Service:    • UMBILICAL HERNIA REPAIR  9/1/2016    Procedure: UMBILICAL HERNIA REPAIR;  Surgeon: Mike Banda M.D.;  Location: SURGERY [facility];  Service:    • APPENDECTOMY LAPAROSCOPIC N/A 7/23/2015    Procedure: APPENDECTOMY LAPAROSCOPIC;  Surgeon: Mike Banda M.D.;  Location: SURGERY [facility];  Service:    • LIPOMA EXCISION  age 6   • PB REPAIR PERF DUOD/MOLLY ULC-WND/INJ  age 27       Family history   Denies neuromuscular disease  Family History " "  Problem Relation Age of Onset   • Heart Disease Father    • GI Maternal Grandmother         cirrhosis - non-alcoholic   • Diabetes Paternal Grandmother          Medications:   Current Outpatient Prescriptions   Medication   • celecoxib (CELEBREX) 200 MG Cap   • pantoprazole (PROTONIX) 20 MG tablet   • Misc Natural Products (LUTEIN 20 PO)   • GREEN COFFEE BEAN PO   • Melatonin 5 MG Cap     No current facility-administered medications for this visit.        Allergies:   Allergies   Allergen Reactions   • Neomycin Swelling       Social Hx:   Social History     Social History   • Marital status:      Spouse name: N/A   • Number of children: N/A   • Years of education: N/A     Occupational History   • Not on file.     Social History Main Topics   • Smoking status: Former Smoker     Packs/day: 0.20     Types: Cigarettes     Quit date: 1/1/1996   • Smokeless tobacco: Never Used   • Alcohol use 0.0 oz/week      Comment: 2 drink per week    • Drug use: No   • Sexual activity: Yes     Other Topics Concern   •  Service No   • Blood Transfusions Yes   • Caffeine Concern No   • Occupational Exposure Yes   • Hobby Hazards No   • Sleep Concern No   • Stress Concern No   • Weight Concern No   • Special Diet No   • Back Care Yes   • Exercise Yes   • Bike Helmet Yes   • Seat Belt Yes   • Self-Exams Yes     Social History Narrative   • No narrative on file         EXAMINATION     Physical Exam:   Vitals: Blood pressure 110/80, pulse 82, temperature 36.4 °C (97.6 °F), height 1.803 m (5' 11\"), weight 87.4 kg (192 lb 10.9 oz), SpO2 96 %.    Constitutional:   Body Habitus: Body mass index is 26.87 kg/m².  Cooperation: Fully cooperates with exam  Appearance: Well-groomed no disheveled, in no acute distress    Respiratory-  breathing comfortable on room air, no audible wheezing  Psychiatric- alert and oriented ×3. Normal affect.   Spine: No focal motor deficits noted in the lower extremities.  Gait is steady.  No PSIS " tenderness bilaterally.  Negative forward flexion test bilaterally.  Stork test is negative bilaterally.  Pain with extension and quadrant loading on the right.      MEDICAL DECISION MAKING    DATA    Labs:  No new labs to review  Lab Results   Component Value Date/Time    SODIUM 134 (L) 08/22/2016 11:48 AM    POTASSIUM 3.9 08/22/2016 11:48 AM    CHLORIDE 101 08/22/2016 11:48 AM    CO2 26 08/22/2016 11:48 AM    GLUCOSE 114 (H) 08/22/2016 11:48 AM    BUN 17 08/22/2016 11:48 AM    CREATININE 1.29 08/22/2016 11:48 AM        No results found for: PROTHROMBTM, INR     Lab Results   Component Value Date/Time    WBC 13.6 (H) 08/22/2016 11:48 AM    RBC 4.98 08/22/2016 11:48 AM    HEMOGLOBIN 14.6 08/22/2016 11:48 AM    HEMATOCRIT 43.3 08/22/2016 11:48 AM    MCV 86.9 08/22/2016 11:48 AM    MCH 29.3 08/22/2016 11:48 AM    MCHC 33.7 08/22/2016 11:48 AM    MPV 11.3 08/22/2016 11:48 AM    NEUTSPOLYS 79.10 (H) 07/24/2015 03:17 AM    LYMPHOCYTES 8.40 (L) 07/24/2015 03:17 AM    MONOCYTES 11.10 07/24/2015 03:17 AM    EOSINOPHILS 0.70 07/24/2015 03:17 AM    BASOPHILS 0.10 07/24/2015 03:17 AM    HYPOCHROMIA 1+ 01/09/2013 12:00 PM        Lab Results   Component Value Date/Time    HBA1C 5.7 (H) 03/08/2016 11:42 AM          Imaging: I personally reviewed following images    I reviewed the following radiology reports    Xray lumbar spine 06/21/2018 Spondylolisthesis is stable with flexion/extension                                        Results for orders placed during the hospital encounter of 05/31/18   MR-LUMBAR SPINE-W/O    Impression Moderate degenerative change of the lumbar spine. No spinal canal narrowing.    Moderate neuroforaminal narrowing at L5-S1 bilaterally.                                              DIAGNOSIS   Visit Diagnoses     ICD-10-CM   1. Lumbar spondylosis M47.816   2. Spondylosis of lumbosacral joint M47.817   3. Right-sided low back pain without sciatica, unspecified chronicity M54.5   4. Spondylolisthesis of  lumbosacral region M43.17         ASSESSMENT and PLAN:     Jamie Bosch Jr. 48 y.o. male who presents for evaluation after right lumbar three through lumbar five medial branch blocks    Jamie was seen today for follow-up.    Diagnoses and all orders for this visit:    Lumbar spondylosis  -     REFERRAL TO PHYSIATRY (PMR)    Spondylosis of lumbosacral joint  -     REFERRAL TO PHYSIATRY (PMR)    Right-sided low back pain without sciatica, unspecified chronicity  -     REFERRAL TO PHYSIATRY (PMR)    Spondylolisthesis of lumbosacral region    Reviewed that lumbar facet blocks are done as a double block prior to consideration of rhizotomy.  We reviewed that our expectation was a short-term effect of the first block.  We reviewed the risks, benefits and expectations of this second set of medial branch blocks.  Discussed that a rhizotomy would have expectations of longer lasting relief.    Discussed continuing his home exercise program.  Continue celebrex for pain relief.      Follow up: 2 weeks after the procedure    Thank you for allowing me to participate in the care of this patient. If you have any questions please not hesitate to contact me.    Today's visit was 20 minutes with more than 50% of the visit being spent in counseling and coordination of care.      Please note that this dictation was created using voice recognition software. I have made every reasonable attempt to correct obvious errors but there may be errors of grammar and content that I may have overlooked prior to finalization of this note.      Earl Levin MD  Interventional Spine and Sports Physiatry  Physical Medicine and Rehabilitation  [facility]  8/7/2018 7:37 AM

## 2018-08-10 DIAGNOSIS — M54.16 LUMBAR BACK PAIN WITH RADICULOPATHY AFFECTING RIGHT LOWER EXTREMITY: ICD-10-CM

## 2018-08-10 NOTE — TELEPHONE ENCOUNTER
Was the patient seen in the last year in this department? Yes    Does patient have an active prescription for medications requested? No     Received Request Via: Pharmacy      Pt met protocol?: Yes, OV 5/18

## 2018-08-13 RX ORDER — CELECOXIB 200 MG/1
200 CAPSULE ORAL 2 TIMES DAILY
Qty: 60 CAP | Refills: 0 | Status: SHIPPED | OUTPATIENT
Start: 2018-08-13 | End: 2018-09-11 | Stop reason: SDUPTHER

## 2018-09-11 DIAGNOSIS — M54.16 LUMBAR BACK PAIN WITH RADICULOPATHY AFFECTING RIGHT LOWER EXTREMITY: ICD-10-CM

## 2018-09-11 RX ORDER — CELECOXIB 200 MG/1
200 CAPSULE ORAL 2 TIMES DAILY
Qty: 60 CAP | Refills: 1 | Status: SHIPPED | OUTPATIENT
Start: 2018-09-11 | End: 2018-10-11

## 2018-09-12 ENCOUNTER — APPOINTMENT (OUTPATIENT)
Dept: PAIN MANAGEMENT | Facility: REHABILITATION | Age: 48
End: 2018-09-12
Attending: PHYSICAL MEDICINE & REHABILITATION
Payer: COMMERCIAL

## 2018-10-02 ENCOUNTER — APPOINTMENT (OUTPATIENT)
Dept: PHYSICAL MEDICINE AND REHAB | Facility: MEDICAL CENTER | Age: 48
End: 2018-10-02
Payer: COMMERCIAL

## 2018-10-17 ENCOUNTER — HOSPITAL ENCOUNTER (OUTPATIENT)
Dept: RADIOLOGY | Facility: REHABILITATION | Age: 48
End: 2018-10-17
Attending: PHYSICAL MEDICINE & REHABILITATION

## 2018-10-17 ENCOUNTER — HOSPITAL ENCOUNTER (OUTPATIENT)
Dept: PAIN MANAGEMENT | Facility: REHABILITATION | Age: 48
End: 2018-10-17
Attending: PHYSICAL MEDICINE & REHABILITATION
Payer: COMMERCIAL

## 2018-10-17 VITALS
SYSTOLIC BLOOD PRESSURE: 129 MMHG | TEMPERATURE: 96.7 F | BODY MASS INDEX: 28.18 KG/M2 | WEIGHT: 201.28 LBS | HEART RATE: 65 BPM | RESPIRATION RATE: 17 BRPM | OXYGEN SATURATION: 96 % | DIASTOLIC BLOOD PRESSURE: 89 MMHG | HEIGHT: 71 IN

## 2018-10-17 PROCEDURE — 64493 INJ PARAVERT F JNT L/S 1 LEV: CPT

## 2018-10-17 PROCEDURE — 700117 HCHG RX CONTRAST REV CODE 255

## 2018-10-17 PROCEDURE — 64494 INJ PARAVERT F JNT L/S 2 LEV: CPT

## 2018-10-17 PROCEDURE — 700101 HCHG RX REV CODE 250

## 2018-10-17 RX ORDER — BUPIVACAINE HYDROCHLORIDE 5 MG/ML
INJECTION, SOLUTION EPIDURAL; INTRACAUDAL
Status: COMPLETED
Start: 2018-10-17 | End: 2018-10-17

## 2018-10-17 RX ORDER — DEXAMETHASONE SODIUM PHOSPHATE 10 MG/ML
INJECTION, SOLUTION INTRAMUSCULAR; INTRAVENOUS
Status: COMPLETED
Start: 2018-10-17 | End: 2018-10-17

## 2018-10-17 RX ADMIN — BUPIVACAINE HYDROCHLORIDE 3 ML: 5 INJECTION, SOLUTION EPIDURAL; INTRACAUDAL; PERINEURAL at 09:05

## 2018-10-17 RX ADMIN — IOHEXOL 3 ML: 240 INJECTION, SOLUTION INTRATHECAL; INTRAVASCULAR; INTRAVENOUS; ORAL at 09:04

## 2018-10-17 ASSESSMENT — PAIN SCALES - GENERAL
PAINLEVEL_OUTOF10: 0
PAINLEVEL_OUTOF10: 7

## 2018-10-17 NOTE — PROCEDURES
Date of Service: 10/17/2018     Physician/s: Earl Levin MD     Pre-operative Diagnosis: Lumbar spondylosis, spondylosis of lumbosacral joint, Right-sided low back pain     Post-operative Diagnosis: Lumbar spondylosis, spondylosis of lumbosacral joint, Right-sided low back pain     Procedure: Medial Branch Blocks targeting the right lumbar three and lumbar four medial branch blocks and right lumbar five dorsal ramus block      Description of procedure:     The risks, benefits, and alternatives of the procedure were reviewed and discussed with the patient.  Written informed consent was freely obtained. A pre-procedural time-out was conducted by the physician verifying patient’s identity, procedure to be performed, procedure site and side, and allergy verification. Appropriate equipment was determined to be in place for the procedure.      In the fluoroscopy suite the patient was placed in a prone position and the skin was prepped and draped in the usual sterile fashion. The fluoroscope was placed over the lower back at the appropriate angles, and the targets for injection were marked.     A 25g 3.5 inch needle was then placed at the intersection of the transverse process and superior articular process at the right lumbar three medial branch location.  The needle tips were then verified by oblique view, less than 1 cc of contrast dye was used to highlight the medial branch while the fluoroscope was running live. Following negative aspiration, approx 1.0cc of 0.5% bupivicaine was then injected at the above levels, and the needles were removed intact.     A 25g 3.5 inch needle was then placed at the intersection of the transverse process and superior articular process at the right lumbar four medial branch location.  The needle tips were then verified by oblique view, less than 1 cc of contrast dye was used to highlight the medial branch while the fluoroscope was running live. Following negative aspiration, approx  1.0cc of 0.5% bupivicaine was then injected at the above levels, and the needles were removed intact.    A 25g 3.5 inch needle was then placed at the intersection of the transverse process and superior articular process at the right lumbar five dorsal ramus nerve  The needle tips were then verified by oblique view, less than 1 cc of contrast dye was used to highlight the medial branch while the fluoroscope was running live. Following negative aspiration, approx 1.00cc of 0.5% bupivicaine was then injected at the above levels, and the needles were removed intact.        The patient's back was covered with a 4x4 gauze, the area was cleansed with sterile normal saline, and a dressing was applied.      There were no complications noted, the patient was hemodynamically stable, and tolerated the procedure well.   He reports pain has decreased and we will send him out with a sheet to track symptoms today.     Earl Levin MD  Physical Medicine and Rehabilitation  Interventional Spine and Sports Physiatry  [facility]

## 2018-10-17 NOTE — NON-PROVIDER
Received patient from procedure accompanied by RN, ambulatory.Fluids tolerated well. Ice compress applied to the affected area. Reviewed home care instructions and understood by patient.Dr. Levin evaluated patient.

## 2018-10-17 NOTE — NON-PROVIDER
Medication reconciliation reviewed with patient. Denied taking any blood thinners and any  any anti- inflammatories medications. He's  been off  Celebrex for 3 days. Patient had a  Jessica / spouse .Home care form and verbal  instruction given to patient and verbalized understanding. Dr. Levin made aware and assessed patient. . Hand off reported to SHERLY Gutiérrez RN.

## 2018-10-17 NOTE — H&P
"Follow up patient note  Pain Medicine, Interventional spine and sports physiatry, Physical medicine rehabilitation        Chief complaint: Right-sided low back pain        HISTORY    Please see new patient note dated 06/21/2018 by Dr Levin,  for more details.      HPI  Patient identification: Jamie Bosch Jr. 48 y.o. male who returns for follow-up after right medial branch blocks, L3-5.     Interval history: Mr. Bosch returns for follow-up after his right medial branch blocks L3-5 on 07/25/2018.  He reports that he his low back pain was better for the first day after the injection.  His pain was totally gone and felt pretty good even the next day.  Currently, his pain is back to the usual level of pain.  He has pain that is primarily right-sided.  No pain radiates below the buttock.  No radicular symptoms.  No numbness or tingling or weakness in the legs.     In the morning, he performs his stretches and he feels stiffest in the morning.    He is here for second right medial branch blocks L3-5.          ROS Red Flags :  Fever, Chills, Sweats: Denies  Involuntary Weight Loss: Denies  Bowel/Bladder Incontinence: Denies  Saddle Anesthesia: Denies          PMHx:   Past Medical History        Past Medical History:   Diagnosis Date   • Bowel habit changes       \"loose stools\" chronic    • GERD (gastroesophageal reflux disease)     • Heart burn     • High cholesterol     • History of duodenal ulcer     • Indigestion              PSHx:   Past Surgical History         Past Surgical History:   Procedure Laterality Date   • CHARLIE BY LAPAROSCOPY   9/1/2016     Procedure: CHARLIE BY LAPAROSCOPY;  Surgeon: Mike Banda M.D.;  Location: SURGERY [facility];  Service:    • UMBILICAL HERNIA REPAIR   9/1/2016     Procedure: UMBILICAL HERNIA REPAIR;  Surgeon: Mike Banda M.D.;  Location: [facility];  Service:    • APPENDECTOMY LAPAROSCOPIC N/A 7/23/2015     Procedure: APPENDECTOMY LAPAROSCOPIC;  Surgeon: " "Mike Banda M.D.;  Location: SURGERY [facility];  Service:    • LIPOMA EXCISION   age 6   • PB REPAIR PERF DUOD/MOLLY ULC-WND/INJ   age 27            Family history   Denies neuromuscular disease  Family History         Family History   Problem Relation Age of Onset   • Heart Disease Father     • GI Maternal Grandmother           cirrhosis - non-alcoholic   • Diabetes Paternal Grandmother                 Medications:       Current Outpatient Prescriptions   Medication   • celecoxib (CELEBREX) 200 MG Cap   • pantoprazole (PROTONIX) 20 MG tablet   • Misc Natural Products (LUTEIN 20 PO)   • GREEN COFFEE BEAN PO   • Melatonin 5 MG Cap      No current facility-administered medications for this visit.          Allergies:        Allergies   Allergen Reactions   • Neomycin Swelling         Social Hx:   Social History   Social History            Social History   • Marital status:        Spouse name: N/A   • Number of children: N/A   • Years of education: N/A          Occupational History   • Not on file.             Social History Main Topics   • Smoking status: Former Smoker       Packs/day: 0.20       Types: Cigarettes       Quit date: 1/1/1996   • Smokeless tobacco: Never Used   • Alcohol use 0.0 oz/week         Comment: 2 drink per week    • Drug use: No   • Sexual activity: Yes           Other Topics Concern   •  Service No   • Blood Transfusions Yes   • Caffeine Concern No   • Occupational Exposure Yes   • Hobby Hazards No   • Sleep Concern No   • Stress Concern No   • Weight Concern No   • Special Diet No   • Back Care Yes   • Exercise Yes   • Bike Helmet Yes   • Seat Belt Yes   • Self-Exams Yes          Social History Narrative   • No narrative on file               EXAMINATION      Physical Exam:   Vitals: Blood pressure 110/80, pulse 82, temperature 36.4 °C (97.6 °F), height 1.803 m (5' 11\"), weight 87.4 kg (192 lb 10.9 oz), SpO2 96 %.     Constitutional:   Body Habitus: Body mass index is " 26.87 kg/m².  Cooperation: Fully cooperates with exam  Appearance: Well-groomed no disheveled, in no acute distress     Respiratory-  breathing comfortable on room air, no audible wheezing  Psychiatric- alert and oriented ×3. Normal affect.   Spine: No focal motor deficits noted in the lower extremities.  Gait is steady.  No PSIS tenderness bilaterally.  Negative forward flexion test bilaterally.  Stork test is negative bilaterally.  Pain with extension and quadrant loading on the right.        MEDICAL DECISION MAKING     DATA     Labs:  No new labs to review        Lab Results   Component Value Date/Time     SODIUM 134 (L) 08/22/2016 11:48 AM     POTASSIUM 3.9 08/22/2016 11:48 AM     CHLORIDE 101 08/22/2016 11:48 AM     CO2 26 08/22/2016 11:48 AM     GLUCOSE 114 (H) 08/22/2016 11:48 AM     BUN 17 08/22/2016 11:48 AM     CREATININE 1.29 08/22/2016 11:48 AM         No results found for: PROTHROMBTM, INR            Lab Results   Component Value Date/Time     WBC 13.6 (H) 08/22/2016 11:48 AM     RBC 4.98 08/22/2016 11:48 AM     HEMOGLOBIN 14.6 08/22/2016 11:48 AM     HEMATOCRIT 43.3 08/22/2016 11:48 AM     MCV 86.9 08/22/2016 11:48 AM     MCH 29.3 08/22/2016 11:48 AM     MCHC 33.7 08/22/2016 11:48 AM     MPV 11.3 08/22/2016 11:48 AM     NEUTSPOLYS 79.10 (H) 07/24/2015 03:17 AM     LYMPHOCYTES 8.40 (L) 07/24/2015 03:17 AM     MONOCYTES 11.10 07/24/2015 03:17 AM     EOSINOPHILS 0.70 07/24/2015 03:17 AM     BASOPHILS 0.10 07/24/2015 03:17 AM     HYPOCHROMIA 1+ 01/09/2013 12:00 PM               Lab Results   Component Value Date/Time     HBA1C 5.7 (H) 03/08/2016 11:42 AM            Imaging: I personally reviewed following images     I reviewed the following radiology reports     Xray lumbar spine 06/21/2018 Spondylolisthesis is stable with flexion/extension                                             Results for orders placed during the hospital encounter of 05/31/18   MR-LUMBAR SPINE-W/O     Impression Moderate  degenerative change of the lumbar spine. No spinal canal narrowing.     Moderate neuroforaminal narrowing at L5-S1 bilaterally.                                               DIAGNOSIS        Visit Diagnoses       ICD-10-CM   1. Lumbar spondylosis M47.816   2. Spondylosis of lumbosacral joint M47.817   3. Right-sided low back pain without sciatica, unspecified chronicity M54.5   4. Spondylolisthesis of lumbosacral region M43.17            ASSESSMENT and PLAN:     Jamie Bosch Jr. 48 y.o. male who presents for evaluation after right lumbar three through lumbar five medial branch blocks     Jamie was seen today for follow-up.     Diagnoses and all orders for this visit:     Lumbar spondylosis  -     REFERRAL TO PHYSIATRY (PMR)     Spondylosis of lumbosacral joint  -     REFERRAL TO PHYSIATRY (PMR)     Right-sided low back pain without sciatica, unspecified chronicity  -     REFERRAL TO PHYSIATRY (PMR)     Spondylolisthesis of lumbosacral region     Reviewed that lumbar facet blocks are done as a double block prior to consideration of rhizotomy.  We reviewed that our expectation was a short-term effect of the first block.  We reviewed the risks, benefits and expectations of this second set of medial branch blocks.  Discussed that a rhizotomy would have expectations of longer lasting relief.  He is here for the second set of MBB.  Questions answered.  No plan for use of sedation.     Discussed continuing his home exercise program.  Continue celebrex for pain relief, held for procedure.    Note updated from visit August 7, 2018.        Follow up: 2 weeks after the procedure     Thank you for allowing me to participate in the care of this patient. If you have any questions please not hesitate to contact me.    Earl Levin MD  Interventional Spine and Sports Physiatry  Physical Medicine and Rehabilitation  [facility]

## 2018-10-18 ENCOUNTER — TELEPHONE (OUTPATIENT)
Dept: PHYSICAL MEDICINE AND REHAB | Facility: MEDICAL CENTER | Age: 48
End: 2018-10-18

## 2018-10-18 NOTE — TELEPHONE ENCOUNTER
I called patient to follow up after his Special procedure from yesterday he states the level of his pain is 8 and the percentage of improvement is 0% , he also mention not have any side effect.

## 2018-11-02 ENCOUNTER — OFFICE VISIT (OUTPATIENT)
Dept: PHYSICAL MEDICINE AND REHAB | Facility: MEDICAL CENTER | Age: 48
End: 2018-11-02
Payer: COMMERCIAL

## 2018-11-02 VITALS
BODY MASS INDEX: 28.66 KG/M2 | SYSTOLIC BLOOD PRESSURE: 120 MMHG | OXYGEN SATURATION: 94 % | DIASTOLIC BLOOD PRESSURE: 90 MMHG | HEART RATE: 90 BPM | TEMPERATURE: 97.4 F | WEIGHT: 200.18 LBS | HEIGHT: 70 IN

## 2018-11-02 DIAGNOSIS — M79.10 MYALGIA: ICD-10-CM

## 2018-11-02 DIAGNOSIS — M48.061 FORAMINAL STENOSIS OF LUMBAR REGION: ICD-10-CM

## 2018-11-02 DIAGNOSIS — M47.817 SPONDYLOSIS OF LUMBOSACRAL JOINT: ICD-10-CM

## 2018-11-02 DIAGNOSIS — M43.17 SPONDYLOLISTHESIS OF LUMBOSACRAL REGION: ICD-10-CM

## 2018-11-02 DIAGNOSIS — M54.5 BILATERAL LOW BACK PAIN, UNSPECIFIED CHRONICITY, WITH SCIATICA PRESENCE UNSPECIFIED: ICD-10-CM

## 2018-11-02 PROCEDURE — 99214 OFFICE O/P EST MOD 30 MIN: CPT | Performed by: PHYSICAL MEDICINE & REHABILITATION

## 2018-11-02 ASSESSMENT — PAIN SCALES - GENERAL: PAINLEVEL: 6=MODERATE PAIN

## 2018-11-02 NOTE — PROGRESS NOTES
"Follow up patient note  Pain Medicine, Interventional spine and sports physiatry, Physical medicine rehabilitation      Chief complaint: Right-sided low back pain      HISTORY    Please see new patient note dated 06/21/2018 by Dr Levin,  for more details.     HPI  Patient identification: Jamie Bosch Jr. 48 y.o. male who returns for follow-up of his low back pain.    Interval history: Mr. Bosch returns for follow-up after his right medial branch blocks L3-5 on 07/25/2018 and on 10/17/2018.  The first block did give significant temporary relief, but the second block did not.      He does think that he has been doing better with exercises that he has been doing at home.  Some of the exercises involve his inversion table and also touching his toes and holding this for 2 minutes.    Overall, he does not have significant radicular pain below the buttocks bilaterally.  Axial pain is the greatest.  No numbness or tingling in his feet.    Also, he reports that he does not sleep well.       ROS Red Flags :  Fever, Chills, Sweats: Denies  Involuntary Weight Loss: Denies  Bowel/Bladder Incontinence: Denies  Saddle Anesthesia: Denies    PMHx:   Past Medical History:   Diagnosis Date   • Bowel habit changes     \"loose stools\" chronic    • GERD (gastroesophageal reflux disease)    • Heart burn    • High cholesterol    • History of duodenal ulcer    • Indigestion      PSHx:   Past Surgical History:   Procedure Laterality Date   • CHARLIE BY LAPAROSCOPY  9/1/2016    Procedure: CHARLIE BY LAPAROSCOPY;  Surgeon: Mike Banda M.D.;  Location: [facility];  Service:    • UMBILICAL HERNIA REPAIR  9/1/2016    Procedure: UMBILICAL HERNIA REPAIR;  Surgeon: Mike Banda M.D.;  Location: [facility];  Service:    • APPENDECTOMY LAPAROSCOPIC N/A 7/23/2015    Procedure: APPENDECTOMY LAPAROSCOPIC;  Surgeon: Mike Banda M.D.;  Location: [facility];  Service:    • LIPOMA EXCISION  age 6   • PB " "REPAIR PERF DUOD/MOLLY ULC-WND/INJ  age 27       Family history   Denies neuromuscular disease  Family History   Problem Relation Age of Onset   • Heart Disease Father    • GI Maternal Grandmother         cirrhosis - non-alcoholic   • Diabetes Paternal Grandmother        Medications:   Current Outpatient Prescriptions   Medication   • Celecoxib (CELEBREX PO)   • pantoprazole (PROTONIX) 20 MG tablet   • Melatonin 5 MG Cap   • Misc Natural Products (LUTEIN 20 PO)   • GREEN COFFEE BEAN PO     No current facility-administered medications for this visit.        Allergies:   Allergies   Allergen Reactions   • Neomycin Swelling       Social Hx:   Social History     Social History   • Marital status:      Spouse name: N/A   • Number of children: N/A   • Years of education: N/A     Occupational History   • Not on file.     Social History Main Topics   • Smoking status: Former Smoker     Packs/day: 0.20     Types: Cigarettes     Quit date: 1/1/1996   • Smokeless tobacco: Never Used   • Alcohol use 0.0 oz/week      Comment: 2 drink per week    • Drug use: No   • Sexual activity: Yes     Other Topics Concern   •  Service No   • Blood Transfusions Yes   • Caffeine Concern No   • Occupational Exposure Yes   • Hobby Hazards No   • Sleep Concern No   • Stress Concern No   • Weight Concern No   • Special Diet No   • Back Care Yes   • Exercise Yes   • Bike Helmet Yes   • Seat Belt Yes   • Self-Exams Yes     Social History Narrative   • No narrative on file         EXAMINATION     Physical Exam:   Vitals: Blood pressure 120/90, pulse 90, temperature 36.3 °C (97.4 °F), temperature source Temporal, height 1.778 m (5' 10\"), weight 90.8 kg (200 lb 2.8 oz), SpO2 94 %.    Constitutional:   Body Habitus: Body mass index is 28.72 kg/m².  Cooperation: Fully cooperates with exam  Appearance: Well-groomed no disheveled, in no acute distress    Respiratory-  breathing comfortable on room air, no audible wheezing  Psychiatric- alert " and oriented ×3. Normal affect.   Spine: No focal motor deficits noted in the lower extremities.    Reflexes are 2+ patella and achilles bilaterally.  Sensation is grossly intact to light touch in the lower extremities bilaterally.  Seated SLR is negative bilaterally.  Gait is steady.  Negative forward flexion test bilaterally.  Stork test is negative bilaterally.    Pain with extension and quadrant loading on the right.      MEDICAL DECISION MAKING    DATA    Labs:  No new labs to review  Lab Results   Component Value Date/Time    SODIUM 134 (L) 08/22/2016 11:48 AM    POTASSIUM 3.9 08/22/2016 11:48 AM    CHLORIDE 101 08/22/2016 11:48 AM    CO2 26 08/22/2016 11:48 AM    GLUCOSE 114 (H) 08/22/2016 11:48 AM    BUN 17 08/22/2016 11:48 AM    CREATININE 1.29 08/22/2016 11:48 AM         Lab Results   Component Value Date/Time    WBC 13.6 (H) 08/22/2016 11:48 AM    RBC 4.98 08/22/2016 11:48 AM    HEMOGLOBIN 14.6 08/22/2016 11:48 AM    HEMATOCRIT 43.3 08/22/2016 11:48 AM    MCV 86.9 08/22/2016 11:48 AM    MCH 29.3 08/22/2016 11:48 AM    MCHC 33.7 08/22/2016 11:48 AM    MPV 11.3 08/22/2016 11:48 AM    NEUTSPOLYS 79.10 (H) 07/24/2015 03:17 AM    LYMPHOCYTES 8.40 (L) 07/24/2015 03:17 AM    MONOCYTES 11.10 07/24/2015 03:17 AM    EOSINOPHILS 0.70 07/24/2015 03:17 AM    BASOPHILS 0.10 07/24/2015 03:17 AM    HYPOCHROMIA 1+ 01/09/2013 12:00 PM        Lab Results   Component Value Date/Time    HBA1C 5.7 (H) 03/08/2016 11:42 AM          Imaging: I personally reviewed following images    I reviewed the following radiology reports    Xray lumbar spine 06/21/2018 Spondylolisthesis is stable with flexion/extension                                        Results for orders placed during the hospital encounter of 05/31/18   MR-LUMBAR SPINE-W/O    Impression Moderate degenerative change of the lumbar spine. No spinal canal narrowing.    Moderate neuroforaminal narrowing at L5-S1 bilaterally.                                             DIAGNOSIS   Visit Diagnoses     ICD-10-CM   1. Foraminal stenosis of lumbar region M99.83   2. Spondylosis of lumbosacral joint M47.817   3. Bilateral low back pain, unspecified chronicity, with sciatica presence unspecified M54.5   4. Spondylolisthesis of lumbosacral region M43.17   5. Myalgia M79.10         ASSESSMENT and PLAN:     Jamie Bosch Jr. 48 y.o. male who presents for evaluation after right lumbar three through lumbar five medial branch blocks    Jamie was seen today for follow-up.    Diagnoses and all orders for this visit:    Foraminal stenosis of lumbar region  -     REFERRAL TO PHYSIATRY (PMR)    Spondylosis of lumbosacral joint    Bilateral low back pain, unspecified chronicity, with sciatica presence unspecified  -     REFERRAL TO PHYSIATRY (PMR)    Spondylolisthesis of lumbosacral region    Myalgia  -     VITAMIN D,25 HYDROXY; Future    Reviewed his imaging studies again and discussed the stable retrolisthesis at several levels in the lumbar spine.  We discussed the foraminal stenosis at the L5-S1 level bilaterally and that right and left lumbar transforaminal epidural steroid injections could be considered.  Discussed risks, benefits and expectations of lumbar transforaminal epidural steroid injections.  Risks include infection, hematoma, worsening or pain and, rarely, can include more serious complications like paralysis.  Questions were answered.  He would like to proceed with trial of right and left TFESI.    Discussed that he does have some symptoms on his exam that suggest lumbar facet arthropathy.  Since he did not have significant relief with the second block, we will put this on hold.  We did discuss that we could reconsider this depending on how he does with above injection.    Discussed continuing his home exercise program from physical therapy.  Reviewed some modifications to his program.  Also, recommend that he add plank exercises and continue with both hamstring stretching.   Continue celebrex for pain relief.    Trial magnesium at bedtime.      Follow up: 2 weeks after the procedure    Thank you for allowing me to participate in the care of this patient. If you have any questions please not hesitate to contact me.    Today's visit was 25 minutes with more than 50% of the visit being spent in counseling and coordination of care.      Please note that this dictation was created using voice recognition software. I have made every reasonable attempt to correct obvious errors but there may be errors of grammar and content that I may have overlooked prior to finalization of this note.      Earl Levin MD  Interventional Spine and Sports Physiatry  Physical Medicine and Rehabilitation  [facility]

## 2018-11-05 ENCOUNTER — NON-PROVIDER VISIT (OUTPATIENT)
Dept: MEDICAL GROUP | Facility: PHYSICIAN GROUP | Age: 48
End: 2018-11-05
Payer: COMMERCIAL

## 2018-11-05 DIAGNOSIS — Z23 NEED FOR VACCINATION: ICD-10-CM

## 2018-11-05 PROCEDURE — 90471 IMMUNIZATION ADMIN: CPT | Performed by: INTERNAL MEDICINE

## 2018-11-05 PROCEDURE — 90686 IIV4 VACC NO PRSV 0.5 ML IM: CPT | Performed by: INTERNAL MEDICINE

## 2018-11-06 NOTE — PROGRESS NOTES
"Jamie Bosch Jr. is a 48 y.o. male here for a non-provider visit for:   FLU    Reason for immunization: Annual Flu Vaccine  Immunization records indicate need for vaccine: Yes, confirmed with Epic and confirmed with [place] [employer]  Minimum interval has been met for this vaccine: Yes  ABN completed: Not Indicated    Order and dose verified by: KEILY  VIS Dated  08/07/2015 was given to patient: Yes  All IAC Questionnaire questions were answered \"No.\"    Patient tolerated injection and no adverse effects were observed or reported: Yes    Pt scheduled for next dose in series: Not Indicated    "

## 2018-11-21 ENCOUNTER — APPOINTMENT (OUTPATIENT)
Dept: PAIN MANAGEMENT | Facility: REHABILITATION | Age: 48
End: 2018-11-21
Attending: PHYSICAL MEDICINE & REHABILITATION
Payer: COMMERCIAL

## 2018-12-04 ENCOUNTER — APPOINTMENT (OUTPATIENT)
Dept: PHYSICAL MEDICINE AND REHAB | Facility: MEDICAL CENTER | Age: 48
End: 2018-12-04
Payer: COMMERCIAL

## 2019-01-31 ENCOUNTER — OFFICE VISIT (OUTPATIENT)
Dept: MEDICAL GROUP | Facility: PHYSICIAN GROUP | Age: 49
End: 2019-01-31
Payer: COMMERCIAL

## 2019-01-31 VITALS
WEIGHT: 198.2 LBS | RESPIRATION RATE: 16 BRPM | DIASTOLIC BLOOD PRESSURE: 86 MMHG | HEIGHT: 70 IN | TEMPERATURE: 97.7 F | HEART RATE: 92 BPM | BODY MASS INDEX: 28.37 KG/M2 | SYSTOLIC BLOOD PRESSURE: 138 MMHG | OXYGEN SATURATION: 95 %

## 2019-01-31 DIAGNOSIS — Z87.898 HISTORY OF ULCER DISEASE: ICD-10-CM

## 2019-01-31 DIAGNOSIS — Z13.6 SCREENING FOR CARDIOVASCULAR CONDITION: ICD-10-CM

## 2019-01-31 DIAGNOSIS — R10.13 EPIGASTRIC PAIN: ICD-10-CM

## 2019-01-31 DIAGNOSIS — Z13.29 SCREENING FOR ENDOCRINE DISORDER: ICD-10-CM

## 2019-01-31 PROCEDURE — 99214 OFFICE O/P EST MOD 30 MIN: CPT | Performed by: FAMILY MEDICINE

## 2019-01-31 ASSESSMENT — PATIENT HEALTH QUESTIONNAIRE - PHQ9: CLINICAL INTERPRETATION OF PHQ2 SCORE: 0

## 2019-01-31 NOTE — PROGRESS NOTES
"Chief Complaint   Patient presents with   • Abdominal Pain     2-3 wks       HISTORY OF PRESENT ILLNESS: Patient is a 48 y.o. male established patient here today for the following concerns:      Here for acute worsening of chronic abdominal pains.  Carole Ayala reports \"GI\" problems since he can remember.  He has hx of bleeding ulcer that almost cost him his life years ago.  He has had cholecystectomy and appendectomy.  Symone has helped a lot of his nausea and post-prandial symptoms.  Now he reports epigastric pains and LUQ pains that seem a bit worse when eating \"ruffage\".  If he eats junk food it does not bother him.  He was sent to GI for evaluation and was placed on PPI prior to endoscopy but never went through with the planned EGD.  He reports no melena or hematochezia.  Has alternating firm and loose stools.  He has occasional heart burn.  He has been using the pantoprazole about 1-2 times per week.  Unsure that it helps the pain or for heart burn taken that way.  He previously had a equivocal H. Pylori IgM testing.  He notes now he is getting sometimes a sticking feeling when eating solid foods, not with liquids.  No regurgitation.  No hx of food allergies or IBD.    No fevers, chills or unintended weight loss.       Past Medical, Social, and Family history reviewed and updated in EPIC    Allergies:Neomycin    Current Outpatient Prescriptions   Medication Sig Dispense Refill   • pantoprazole (PROTONIX) 20 MG tablet      • Melatonin 5 MG Cap Take  by mouth.       No current facility-administered medications for this visit.          ROS:  Review of Systems   Constitutional: Negative for fever, chills, weight loss and malaise/fatigue.   HENT: Negative for ear pain, nosebleeds, congestion, sore throat and neck pain.    Eyes: Negative for blurred vision.   Respiratory: Negative for cough, sputum production, shortness of breath and wheezing.    Cardiovascular: Negative for chest pain, palpitations,  and leg swelling. " "  Gastrointestinal: + for heartburn, nausea, no vomiting, diarrhea and +abdominal pain.   Genitourinary: Negative for dysuria, urgency and frequency.   Musculoskeletal: Negative for myalgias, back pain and joint pain.   Skin: Negative for rash and itching.   Neurological: Negative for dizziness, tingling, tremors, sensory change, focal weakness and headaches.   Endo/Heme/Allergies: Does not bruise/bleed easily.   Psychiatric/Behavioral: Negative for depression, anxiety, suicidal ideas, insomnia and memory loss.      Exam:  Blood pressure 138/86, pulse 92, temperature 36.5 °C (97.7 °F), resp. rate 16, height 1.778 m (5' 10\"), weight 89.9 kg (198 lb 3.2 oz), SpO2 95 %.    General:  Well nourished, well developed in NAD  Head is grossly normal.  Neck: Supple without JVD   Pulmonary:  Normal effort.   Cardiovascular: Regular rate  Extremities: no clubbing, cyanosis, or edema.  Psych: affect appropriate  Abdomen: positive bowel sounds.  Non tender, non distended, no hepatosplenomegaly.       Please note that this dictation was created using voice recognition software. I have made every reasonable attempt to correct obvious errors, but I expect that there are errors of grammar and possibly content that I did not discover before finalizing the note.    Assessment/Plan:  1. Epigastric pain  Check   Hold PPI for 2 weeks then check   - H. PYLORI BREATH TEST  - CBC WITH DIFFERENTIAL; Future  - LIPASE; Future  - AMYLASE; Future  - DX-ESOPHAGUS - BARIUM SWALLOW; Future - possible hiatal hernia.    - GASTRIN; Future    2. Screening for endocrine disorder    - COMP METABOLIC PANEL; Future  - Lipid Profile; Future  - HEMOGLOBIN A1C; Future    3. Screening for cardiovascular condition    - COMP METABOLIC PANEL; Future  - Lipid Profile; Future    4. History of ulcer disease    - GASTRIN; Future    1 month follow up         "

## 2019-02-04 ENCOUNTER — HOSPITAL ENCOUNTER (OUTPATIENT)
Dept: LAB | Facility: MEDICAL CENTER | Age: 49
End: 2019-02-04
Attending: FAMILY MEDICINE
Payer: COMMERCIAL

## 2019-02-04 DIAGNOSIS — R10.13 EPIGASTRIC PAIN: ICD-10-CM

## 2019-02-04 DIAGNOSIS — Z87.898 HISTORY OF ULCER DISEASE: ICD-10-CM

## 2019-02-04 DIAGNOSIS — Z13.29 SCREENING FOR ENDOCRINE DISORDER: ICD-10-CM

## 2019-02-04 DIAGNOSIS — Z13.6 SCREENING FOR CARDIOVASCULAR CONDITION: ICD-10-CM

## 2019-02-04 LAB
ALBUMIN SERPL BCP-MCNC: 4.5 G/DL (ref 3.2–4.9)
ALBUMIN/GLOB SERPL: 1.9 G/DL
ALP SERPL-CCNC: 66 U/L (ref 30–99)
ALT SERPL-CCNC: 29 U/L (ref 2–50)
AMYLASE SERPL-CCNC: 28 U/L (ref 20–103)
ANION GAP SERPL CALC-SCNC: 8 MMOL/L (ref 0–11.9)
AST SERPL-CCNC: 21 U/L (ref 12–45)
BASOPHILS # BLD AUTO: 0.5 % (ref 0–1.8)
BASOPHILS # BLD: 0.03 K/UL (ref 0–0.12)
BILIRUB SERPL-MCNC: 1.2 MG/DL (ref 0.1–1.5)
BUN SERPL-MCNC: 19 MG/DL (ref 8–22)
CALCIUM SERPL-MCNC: 9.4 MG/DL (ref 8.5–10.5)
CHLORIDE SERPL-SCNC: 105 MMOL/L (ref 96–112)
CHOLEST SERPL-MCNC: 167 MG/DL (ref 100–199)
CO2 SERPL-SCNC: 24 MMOL/L (ref 20–33)
CREAT SERPL-MCNC: 0.86 MG/DL (ref 0.5–1.4)
EOSINOPHIL # BLD AUTO: 0.18 K/UL (ref 0–0.51)
EOSINOPHIL NFR BLD: 2.7 % (ref 0–6.9)
ERYTHROCYTE [DISTWIDTH] IN BLOOD BY AUTOMATED COUNT: 42.1 FL (ref 35.9–50)
EST. AVERAGE GLUCOSE BLD GHB EST-MCNC: 111 MG/DL
FASTING STATUS PATIENT QL REPORTED: NORMAL
GLOBULIN SER CALC-MCNC: 2.4 G/DL (ref 1.9–3.5)
GLUCOSE SERPL-MCNC: 95 MG/DL (ref 65–99)
HBA1C MFR BLD: 5.5 % (ref 0–5.6)
HCT VFR BLD AUTO: 46.9 % (ref 42–52)
HDLC SERPL-MCNC: 23 MG/DL
HGB BLD-MCNC: 16.2 G/DL (ref 14–18)
IMM GRANULOCYTES # BLD AUTO: 0.02 K/UL (ref 0–0.11)
IMM GRANULOCYTES NFR BLD AUTO: 0.3 % (ref 0–0.9)
LDLC SERPL CALC-MCNC: ABNORMAL MG/DL
LIPASE SERPL-CCNC: 33 U/L (ref 11–82)
LYMPHOCYTES # BLD AUTO: 1.43 K/UL (ref 1–4.8)
LYMPHOCYTES NFR BLD: 21.5 % (ref 22–41)
MCH RBC QN AUTO: 30 PG (ref 27–33)
MCHC RBC AUTO-ENTMCNC: 34.5 G/DL (ref 33.7–35.3)
MCV RBC AUTO: 86.9 FL (ref 81.4–97.8)
MONOCYTES # BLD AUTO: 0.65 K/UL (ref 0–0.85)
MONOCYTES NFR BLD AUTO: 9.8 % (ref 0–13.4)
NEUTROPHILS # BLD AUTO: 4.33 K/UL (ref 1.82–7.42)
NEUTROPHILS NFR BLD: 65.2 % (ref 44–72)
NRBC # BLD AUTO: 0 K/UL
NRBC BLD-RTO: 0 /100 WBC
PLATELET # BLD AUTO: 192 K/UL (ref 164–446)
PMV BLD AUTO: 12 FL (ref 9–12.9)
POTASSIUM SERPL-SCNC: 3.9 MMOL/L (ref 3.6–5.5)
PROT SERPL-MCNC: 6.9 G/DL (ref 6–8.2)
RBC # BLD AUTO: 5.4 M/UL (ref 4.7–6.1)
SODIUM SERPL-SCNC: 137 MMOL/L (ref 135–145)
TRIGL SERPL-MCNC: 925 MG/DL (ref 0–149)
WBC # BLD AUTO: 6.6 K/UL (ref 4.8–10.8)

## 2019-02-04 PROCEDURE — 82150 ASSAY OF AMYLASE: CPT

## 2019-02-04 PROCEDURE — 82941 ASSAY OF GASTRIN: CPT

## 2019-02-04 PROCEDURE — 80061 LIPID PANEL: CPT

## 2019-02-04 PROCEDURE — 80053 COMPREHEN METABOLIC PANEL: CPT

## 2019-02-04 PROCEDURE — 85025 COMPLETE CBC W/AUTO DIFF WBC: CPT

## 2019-02-04 PROCEDURE — 83036 HEMOGLOBIN GLYCOSYLATED A1C: CPT

## 2019-02-04 PROCEDURE — 36415 COLL VENOUS BLD VENIPUNCTURE: CPT

## 2019-02-04 PROCEDURE — 83690 ASSAY OF LIPASE: CPT

## 2019-02-06 LAB — GASTRIN SERPL-MCNC: 57 PG/ML (ref 0–100)

## 2019-02-08 ENCOUNTER — TELEPHONE (OUTPATIENT)
Dept: MEDICAL GROUP | Facility: PHYSICIAN GROUP | Age: 49
End: 2019-02-08

## 2019-02-08 NOTE — TELEPHONE ENCOUNTER
Phone Number Called: [phone] (home)     Message: Apt made 2/20/19.     Left Message for patient to call back: N\A

## 2019-02-08 NOTE — TELEPHONE ENCOUNTER
----- Message from Amy Ahmadi M.D. sent at 2/7/2019  3:26 PM PST -----  Please call patient to ensure they received their results through My Chart.  The patient requires follow up office visit to discuss cholesterol

## 2019-02-12 ENCOUNTER — HOSPITAL ENCOUNTER (OUTPATIENT)
Dept: RADIOLOGY | Facility: MEDICAL CENTER | Age: 49
End: 2019-02-12
Attending: FAMILY MEDICINE
Payer: COMMERCIAL

## 2019-02-12 DIAGNOSIS — R10.13 EPIGASTRIC PAIN: ICD-10-CM

## 2019-02-12 PROCEDURE — 700101 HCHG RX REV CODE 250: Performed by: FAMILY MEDICINE

## 2019-02-12 PROCEDURE — 74220 X-RAY XM ESOPHAGUS 1CNTRST: CPT

## 2019-02-12 RX ADMIN — BARIUM SULFATE 700 MG: 700 TABLET ORAL at 13:15

## 2019-02-20 ENCOUNTER — HOSPITAL ENCOUNTER (OUTPATIENT)
Dept: LAB | Facility: MEDICAL CENTER | Age: 49
End: 2019-02-20
Attending: FAMILY MEDICINE
Payer: COMMERCIAL

## 2019-02-20 ENCOUNTER — OFFICE VISIT (OUTPATIENT)
Dept: MEDICAL GROUP | Facility: PHYSICIAN GROUP | Age: 49
End: 2019-02-20
Payer: COMMERCIAL

## 2019-02-20 VITALS
HEIGHT: 70 IN | RESPIRATION RATE: 16 BRPM | SYSTOLIC BLOOD PRESSURE: 120 MMHG | WEIGHT: 194 LBS | HEART RATE: 86 BPM | DIASTOLIC BLOOD PRESSURE: 86 MMHG | OXYGEN SATURATION: 96 % | TEMPERATURE: 97.6 F | BODY MASS INDEX: 27.77 KG/M2

## 2019-02-20 DIAGNOSIS — K21.9 GASTROESOPHAGEAL REFLUX DISEASE, ESOPHAGITIS PRESENCE NOT SPECIFIED: ICD-10-CM

## 2019-02-20 DIAGNOSIS — Z91.89 OTHER SPECIFIED PERSONAL RISK FACTORS, NOT ELSEWHERE CLASSIFIED: ICD-10-CM

## 2019-02-20 DIAGNOSIS — E78.49 FAMILIAL HYPERLIPIDEMIA: ICD-10-CM

## 2019-02-20 PROCEDURE — 83013 H PYLORI (C-13) BREATH: CPT

## 2019-02-20 PROCEDURE — 99214 OFFICE O/P EST MOD 30 MIN: CPT | Performed by: FAMILY MEDICINE

## 2019-02-20 RX ORDER — PANTOPRAZOLE SODIUM 40 MG/1
40 TABLET, DELAYED RELEASE ORAL DAILY
Qty: 90 TAB | Refills: 3 | Status: SHIPPED | OUTPATIENT
Start: 2019-02-20 | End: 2020-04-17

## 2019-02-20 RX ORDER — ROSUVASTATIN CALCIUM 40 MG/1
40 TABLET, COATED ORAL DAILY
Qty: 30 TAB | Refills: 3 | Status: SHIPPED | OUTPATIENT
Start: 2019-02-20 | End: 2019-06-09 | Stop reason: SDUPTHER

## 2019-02-20 NOTE — PROGRESS NOTES
Chief Complaint   Patient presents with   • Hyperlipidemia     fv labs       HISTORY OF PRESENT ILLNESS: Patient is a 48 y.o. male established patient here today for the following concerns:    1. Gastroesophageal reflux disease, esophagitis presence not specified  Here today for follow up.  Had had about 2 weeks of severe epigastric pains.  This has since resolved, but continues with nightly regurgitation and reflux.  Barrium swallow did demonstrate significant reflux.  He has not yet resumed the pantoprazole because of H. Pylori testing hasn't been completed.  Plans to do this today.     2. Familial hyperlipidemia  3. Other specified personal risk factors, not elsewhere classified  Continues with very high cholesterol, mostly TG.  Has been on statins and fibrates, but had liver enzyme elevation and never had great control on lipids.  He is concerned about his risk as his brother-in-law just  from stroke.  Wondering if any additional weight loss might help.  He does have hx of fatty liver disease as well.        Past Medical, Social, and Family history reviewed and updated in EPIC    Allergies:Neomycin    Current Outpatient Prescriptions   Medication Sig Dispense Refill   • pantoprazole (PROTONIX) 40 MG Tablet Delayed Response Take 1 Tab by mouth every day. 90 Tab 3   • rosuvastatin (CRESTOR) 40 MG tablet Take 1 Tab by mouth every day. 30 Tab 3   • Melatonin 5 MG Cap Take  by mouth.       No current facility-administered medications for this visit.          ROS:  Review of Systems   Constitutional: Negative for fever, chills, weight loss and malaise/fatigue.   HENT: Negative for ear pain, nosebleeds, congestion, sore throat and neck pain.    Eyes: Negative for blurred vision.   Respiratory: Negative for cough, sputum production, shortness of breath and wheezing.    Cardiovascular: Negative for chest pain, palpitations,  and leg swelling.   Gastrointestinal: Negative for heartburn, nausea, vomiting, diarrhea and  "abdominal pain.   Genitourinary: Negative for dysuria, urgency and frequency.   Musculoskeletal: Negative for myalgias, back pain and joint pain.   Skin: Negative for rash and itching.   Neurological: Negative for dizziness, tingling, tremors, sensory change, focal weakness and headaches.   Endo/Heme/Allergies: Does not bruise/bleed easily.   Psychiatric/Behavioral: Negative for depression, anxiety, suicidal ideas, insomnia and memory loss.      Exam:  Blood pressure 120/86, pulse 86, temperature 36.4 °C (97.6 °F), resp. rate 16, height 1.778 m (5' 10\"), weight 88 kg (194 lb), SpO2 96 %.    General:  Well nourished, well developed in NAD  Head is grossly normal.  Neck: Supple without JVD   Pulmonary:  Normal effort.   Cardiovascular: Regular rate  Extremities: no clubbing, cyanosis, or edema.  Psych: affect appropriate      Please note that this dictation was created using voice recognition software. I have made every reasonable attempt to correct obvious errors, but I expect that there are errors of grammar and possibly content that I did not discover before finalizing the note.    Assessment/Plan:  1. Gastroesophageal reflux disease, esophagitis presence not specified  Check h. Pylori and start  - pantoprazole (PROTONIX) 40 MG Tablet Delayed Response; Take 1 Tab by mouth every day.  Dispense: 90 Tab; Refill: 3    2. Familial hyperlipidemia  Will start high dose Crestor, check NMR in 1 month, Coronary CT and consult with lipid clinic about possible alternative cholesterol treatment.   - REFERRAL TO LIPID CLINIC  - rosuvastatin (CRESTOR) 40 MG tablet; Take 1 Tab by mouth every day.  Dispense: 30 Tab; Refill: 3  - LipoFit by NMR; Future  - CT-CARDIAC SCORING; Future  - HEPATIC FUNCTION PANEL; Future    3. Other specified personal risk factors, not elsewhere classified  - LipoFit by NMR; Future  - CT-CARDIAC SCORING; Future  - HEPATIC FUNCTION PANEL; Future    Return to clinic in 1 month.         "

## 2019-02-22 LAB — UREA BREATH TEST QL: NEGATIVE

## 2019-04-30 ENCOUNTER — OFFICE VISIT (OUTPATIENT)
Dept: MEDICAL GROUP | Facility: PHYSICIAN GROUP | Age: 49
End: 2019-04-30
Payer: COMMERCIAL

## 2019-04-30 ENCOUNTER — HOSPITAL ENCOUNTER (OUTPATIENT)
Dept: RADIOLOGY | Facility: MEDICAL CENTER | Age: 49
End: 2019-04-30
Attending: FAMILY MEDICINE
Payer: COMMERCIAL

## 2019-04-30 VITALS
DIASTOLIC BLOOD PRESSURE: 90 MMHG | OXYGEN SATURATION: 96 % | HEART RATE: 91 BPM | TEMPERATURE: 98.4 F | HEIGHT: 70 IN | WEIGHT: 189.9 LBS | SYSTOLIC BLOOD PRESSURE: 142 MMHG | BODY MASS INDEX: 27.19 KG/M2 | RESPIRATION RATE: 12 BRPM

## 2019-04-30 DIAGNOSIS — N50.89 TESTICULAR MASS: ICD-10-CM

## 2019-04-30 DIAGNOSIS — N50.89 EPIDIDYMAL MASS: ICD-10-CM

## 2019-04-30 PROBLEM — R12 HEART BURN: Status: RESOLVED | Noted: 2018-03-05 | Resolved: 2019-04-30

## 2019-04-30 PROCEDURE — 76870 US EXAM SCROTUM: CPT

## 2019-04-30 PROCEDURE — 99213 OFFICE O/P EST LOW 20 MIN: CPT | Performed by: FAMILY MEDICINE

## 2019-04-30 NOTE — PROGRESS NOTES
cc: Left testicular lump    Subjective:     Jamie Bosch Jr. is a 49 y.o. male presenting left testicular lump.  Patient is a bicycle athlete.  History of umbilical hernia with repair.  No inguinal hernia.  Past medical history vasectomy.  No history of undescended testes.  He noticed the lump yesterday.  He denies any pain.  He felt it while going for bicycling right.  When he bends over the pain he feels the pain.  No pain when he coughs.  He reports he is urinating and stooling well and no issues with his penis or prostate.  No trauma in his testicular region.  No bleeding.  Denies any fever.  No changes of color in the skin.  No nausea or vomiting or abdominal pain. Here with wife Jessica.  No history of testicular issues in the past and no history of cancer.  History of umbilical hernia with operation and repair done to the umbilical hernia that is no longer bothering him.    Review of systems:     Constitutional: Negative for fever, chills and negative fatigue.   Respiratory: Negative for cough and shortness of breath, negative for exertional shortness of breath  Cardiovascular: Negative for leg swelling, negative for palpitations, negative for chest pain  Gastrointestinal: Negative for nausea, vomiting, abdominal pain, constipation and diarrhea.  Genitourinary: Negative for dysuria and hematuria.  Positive left testicular lump.  Skin: Negative for rash.   Neurological: Negative for dizziness, focal weakness and headaches.   Endo/Heme/Allergies: Denies bleeding, bruising, and recurrent allergies.        Current Outpatient Prescriptions:   •  magnesium chloride (MAG-64) 64 MG Tablet Delayed Response, Take 64 mg by mouth every day., Disp: , Rfl:   •  pantoprazole (PROTONIX) 40 MG Tablet Delayed Response, Take 1 Tab by mouth every day., Disp: 90 Tab, Rfl: 3  •  rosuvastatin (CRESTOR) 40 MG tablet, Take 1 Tab by mouth every day., Disp: 30 Tab, Rfl: 3    Allergies, past medical history, past surgical history, family  "history, social history reviewed and updated    Objective:     Vitals: /90 (BP Location: Left arm, Patient Position: Sitting, BP Cuff Size: Adult)   Pulse 91   Temp 36.9 °C (98.4 °F) (Temporal)   Resp 12   Ht 1.778 m (5' 10\")   Wt 86.1 kg (189 lb 14.4 oz)   SpO2 96%   BMI 27.25 kg/m²   General: Alert, pleasant, NAD  HEENT: Normocephalic.  Nontraumatic. EOMI, no icterus or pallor.  Conjunctivae and lids normal. External ears normal. Oropharynx non-erythematous, mucous membranes moist.  Neck supple.  No thyromegaly or masses palpated. No cervical or supraclavicular lymphadenopathy.  Heart: Regular rate and rhythm.  S1 and S2 normal.  No murmurs appreciated.  Respiratory: Normal respiratory effort.  Clear to auscultation bilaterally.  Abdomen: Non-distended, soft, non tender in all 4 quadrants.  Skin: Warm, dry, no rashes.  Musculoskeletal: Gait is normal.  Moves all extremities well.  Extremities: No leg edema.     exam with penis within normal limits and with right scrotum within normal limits of the left scrotum with a hard testicular mass on the left scrotum around that hard and mildly tender on deep palpation and no inguinal hernia felt.  Skin of testicular region intact and no transillumination.  Psych:  Affect/mood is normal, judgement is good, memory is intact, grooming is appropriate.    Assessment/Plan:     Diagnoses and all orders for this visit:    Testicular mass  -     Cancel: [id number]; Future  -     Cancel: US-DUPLEX TESTICLE COMPLETE (COMBO); Future  -     [id number]; Future    -For scrotal swelling rest and limit activity and lying down the preferred position for swelling and can put some ice on the scrotum.  Wear loose fitting clothing and do monthly self exams of the scrotum and watch and observe and monitor for any changes in your scrotum.  Read intensive patient instruction section about testicular lumps and ER precautions given if any intense testicular pain, " changes in the skin, concern for testicular torsion ER or call 911 and read patient instructions for testicular mass will get ultrasound of scrotum and call patient with results and consider possible urology referral depending on report.  Stat ultrasound obtained on the left testicle.    Return if symptoms worsen or fail to improve.

## 2019-04-30 NOTE — PATIENT INSTRUCTIONS
Diagnoses and all orders for this visit:    Testicular mass  -     Cancel: [id number]; Future  -     Cancel: US-DUPLEX TESTICLE COMPLETE (COMBO); Future  -     [id number]; Future    -For scrotal swelling rest and limit activity and lying down the preferred position for swelling and can put some ice on the scrotum.  Wear loose fitting clothing and do monthly self exams of the scrotum and watch and observe and monitor for any changes in your scrotum.  Read intensive patient instruction section about testicular lumps and ER precautions given if any intense testicular pain, changes in the skin, concern for testicular torsion ER or call 911 and read patient instructions for testicular mass will get ultrasound of scrotum and call patient with results and consider possible urology referral depending on report.  Stat ultrasound obtained on the left testicle.    Return if symptoms worsen or fail to improve.    Scrotal Masses  Scrotal swelling is common in men of all ages. Common types of testicular masses include:   · Hydrocele. The most common benign testicular mass in an adult. Hydroceles are generally soft and painless collections of fluid in the scrotal sac. These can rapidly change size as the fluid enters or leaves. Hydroceles can be associated with an underlying cancer of the testicle.  · Spermatoceles. Generally soft and painless cyst-like masses in the scrotum that contain fluid, usually above the testicle. They can rapidly change size as the fluid enters or leaves. They are more prominent while standing or exercising. Sometimes, spermatoceles may cause a sensation of heaviness or a dull ache.  · Orchitis. Inflammation of the testicle. It is painful and may be associated with a fever or symptoms of a urinary tract infection, including frequent and painful urination. It is common in males who have the mumps.  · Varicocele. An enlargement of the veins that drain the testicles. Varicoceles usually  occur on the left side of the scrotum. This condition can increase the risk of infertility. Varicocele is sometimes more prominent while standing or exercising. Sometimes, varicoceles may cause a sensation of heaviness or a dull ache.  · Inguinal hernia. A bulge caused by a portion of intestine protruding into the scrotum through a weak area in the abdominal muscles. Hernias may or may not be painful. They are soft and usually enlarge with coughing or straining.  · Torsion of the testis. This can cause a testicular mass that develops quickly and is associated with tenderness or fever, or both. It is caused by a twisting of the testicle within the sac. It also reduces the blood supply and can destroy the testis if not treated quickly with surgery.  · Epididymitis. Inflammation of the epididymis (a structure attached above and behind the testicle), usually caused by a urinary tract infection or a sexually transmitted infection. This generally shows up as testicular discomfort and swelling and may include pain during urination. It is frequently associated with a testicle infection.  · Testicular appendages. Remnants of tissue on the testis present since birth. A testicular appendage can twist on its blood supply and cause pain. In most cases, this is seen as a blue dot on the scrotum.  · Hematocele. A collection of blood between the layers of the sac inside the scrotum. It usually is caused by trauma to the scrotum.  · Sebaceous cysts. These can be a swelling in the skin of the scrotum and are usually painless.  · Cancer (carcinoma) of the skin of the scrotum. It can cause scrotal swelling, but this is rare.     This information is not intended to replace advice given to you by your health care provider. Make sure you discuss any questions you have with your health care provider.       Scrotal Swelling  Scrotal swelling may occur on one or both sides of the scrotum. Pain may also occur with swelling. Possible causes of  scrotal swelling include:  · Injury.  · Infection.  · An ingrown hair or abrasion in the area.  · Repeated rubbing from tight-fitting underwear.  · Poor hygiene.  · A weakened area in the muscles around the groin (hernia). A hernia can allow abdominal contents to push into the scrotum.  · Fluid around the testicle (hydrocele).  · Enlarged vein around the testicle (varicocele).  · Certain medical treatments or existing conditions.  · A recent genital surgery or procedure.  · The spermatic cord becomes twisted in the scrotum, which cuts off blood supply (testicular torsion).  · Testicular cancer.  Follow these instructions at home:  Once the cause of your scrotal swelling has been determined, you may be asked to monitor your scrotum for any changes. The following actions may help to alleviate any discomfort you are experiencing:  · Rest and limit activity until the swelling goes away. Lying down is the preferred position.  · Put ice on the scrotum:  ¨ Put ice in a plastic bag.  ¨ Place a towel between your skin and the bag.  ¨ Leave the ice on for 20 minutes, 2-3 times a day for 1-2 days.  · Place a rolled towel under the testicles for support.  · Wear loose-fitting clothing or an athletic support cup for comfort.  · Take all medicines as directed by your health care provider.  · Perform a monthly self-exam of the scrotum and penis. Feel for changes. Ask your health care provider how to perform a monthly self-exam if you are unsure.  Contact a health care provider if:  · You have a sudden (acute) onset of pain that is persistent and not improving.  · You notice a heavy feeling or fluid in the scrotum.  · You have pain or burning while urinating.  · You have blood in the urine or semen.  · You feel a lump around the testicle.  · You notice that one testicle is larger than the other (slight variation is normal).  · You have a persistent dull ache or pain in the groin or scrotum.  Get help right away if:  · The pain does  not go away or becomes severe.  · You have a fever or shaking chills.  · You have pain or vomiting that cannot be controlled.  · You notice significant redness or swelling of one or both sides of the scrotum.  · You experience redness spreading upward from your scrotum to your abdomen or downward from your scrotum to your thighs.  This information is not intended to replace advice given to you by your health care provider. Make sure you discuss any questions you have with your health care provider.

## 2019-05-20 ENCOUNTER — APPOINTMENT (OUTPATIENT)
Dept: RADIOLOGY | Facility: MEDICAL CENTER | Age: 49
End: 2019-05-20
Attending: FAMILY MEDICINE
Payer: COMMERCIAL

## 2019-07-16 ENCOUNTER — OFFICE VISIT (OUTPATIENT)
Dept: URGENT CARE | Facility: MEDICAL CENTER | Age: 49
End: 2019-07-16
Payer: COMMERCIAL

## 2019-07-16 ENCOUNTER — HOSPITAL ENCOUNTER (OUTPATIENT)
Dept: RADIOLOGY | Facility: MEDICAL CENTER | Age: 49
End: 2019-07-16
Attending: FAMILY MEDICINE
Payer: COMMERCIAL

## 2019-07-16 VITALS
BODY MASS INDEX: 27.35 KG/M2 | WEIGHT: 191 LBS | HEART RATE: 95 BPM | DIASTOLIC BLOOD PRESSURE: 76 MMHG | HEIGHT: 70 IN | SYSTOLIC BLOOD PRESSURE: 104 MMHG | OXYGEN SATURATION: 95 % | TEMPERATURE: 98.6 F

## 2019-07-16 DIAGNOSIS — R10.9 ABDOMINAL DISCOMFORT: ICD-10-CM

## 2019-07-16 DIAGNOSIS — K57.92 DIVERTICULITIS: ICD-10-CM

## 2019-07-16 PROCEDURE — 74019 RADEX ABDOMEN 2 VIEWS: CPT

## 2019-07-16 PROCEDURE — 99214 OFFICE O/P EST MOD 30 MIN: CPT | Performed by: FAMILY MEDICINE

## 2019-07-16 RX ORDER — AMOXICILLIN AND CLAVULANATE POTASSIUM 875; 125 MG/1; MG/1
1 TABLET, FILM COATED ORAL 2 TIMES DAILY
Qty: 20 TAB | Refills: 0 | Status: SHIPPED | OUTPATIENT
Start: 2019-07-16 | End: 2019-07-26

## 2019-07-16 NOTE — PATIENT INSTRUCTIONS
Based on your clinical presentation I believe you have diverticulitis  Please start your antibiotic twice daily as directed for 10 days  Follow up if not significantly improved as expected, sooner if any worsening or new symptoms        Diverticulitis  Diverticulitis is when small pockets that have formed in your colon (large intestine) become infected or swollen.  Follow these instructions at home:  · Follow your doctor's instructions.  · Follow a special diet if told by your doctor.  · When you feel better, your doctor may tell you to change your diet. You may be told to eat a lot of fiber. Fruits and vegetables are good sources of fiber. Fiber makes it easier to poop (have bowel movements).  · Take supplements or probiotics as told by your doctor.  · Only take medicines as told by your doctor.  · Keep all follow-up visits with your doctor.  Contact a doctor if:  · Your pain does not get better.  · You have a hard time eating food.  · You are not pooping like normal.  Get help right away if:  · Your pain gets worse.  · Your problems do not get better.  · Your problems suddenly get worse.  · You have a fever.  · You keep throwing up (vomiting).  · You have bloody or black, tarry poop (stool).  This information is not intended to replace advice given to you by your health care provider. Make sure you discuss any questions you have with your health care provider.  Document Released: 06/05/2009 Document Revised: 05/25/2017 Document Reviewed: 11/12/2014  [employer] Patient Education © 2017 [employer]

## 2019-07-17 ENCOUNTER — HOSPITAL ENCOUNTER (EMERGENCY)
Facility: MEDICAL CENTER | Age: 49
End: 2019-07-17
Attending: EMERGENCY MEDICINE
Payer: COMMERCIAL

## 2019-07-17 ENCOUNTER — APPOINTMENT (OUTPATIENT)
Dept: RADIOLOGY | Facility: MEDICAL CENTER | Age: 49
End: 2019-07-17
Attending: EMERGENCY MEDICINE
Payer: COMMERCIAL

## 2019-07-17 VITALS
DIASTOLIC BLOOD PRESSURE: 81 MMHG | OXYGEN SATURATION: 99 % | HEART RATE: 81 BPM | WEIGHT: 198.63 LBS | TEMPERATURE: 98.6 F | SYSTOLIC BLOOD PRESSURE: 111 MMHG | BODY MASS INDEX: 28.5 KG/M2 | RESPIRATION RATE: 18 BRPM

## 2019-07-17 DIAGNOSIS — K57.92 DIVERTICULITIS: ICD-10-CM

## 2019-07-17 LAB
ALBUMIN SERPL BCP-MCNC: 4.5 G/DL (ref 3.2–4.9)
ALBUMIN/GLOB SERPL: 1.7 G/DL
ALP SERPL-CCNC: 66 U/L (ref 30–99)
ALT SERPL-CCNC: 21 U/L (ref 2–50)
AMORPH CRY #/AREA URNS HPF: PRESENT /HPF
ANION GAP SERPL CALC-SCNC: 10 MMOL/L (ref 0–11.9)
APPEARANCE UR: CLEAR
AST SERPL-CCNC: 17 U/L (ref 12–45)
BASOPHILS # BLD AUTO: 0.2 % (ref 0–1.8)
BASOPHILS # BLD: 0.03 K/UL (ref 0–0.12)
BILIRUB SERPL-MCNC: 1.5 MG/DL (ref 0.1–1.5)
BILIRUB UR QL STRIP.AUTO: NEGATIVE
BUN SERPL-MCNC: 15 MG/DL (ref 8–22)
CALCIUM SERPL-MCNC: 9.5 MG/DL (ref 8.5–10.5)
CHLORIDE SERPL-SCNC: 101 MMOL/L (ref 96–112)
CO2 SERPL-SCNC: 24 MMOL/L (ref 20–33)
COLOR UR: YELLOW
CREAT SERPL-MCNC: 0.86 MG/DL (ref 0.5–1.4)
EOSINOPHIL # BLD AUTO: 0.23 K/UL (ref 0–0.51)
EOSINOPHIL NFR BLD: 1.6 % (ref 0–6.9)
ERYTHROCYTE [DISTWIDTH] IN BLOOD BY AUTOMATED COUNT: 41.5 FL (ref 35.9–50)
GLOBULIN SER CALC-MCNC: 2.6 G/DL (ref 1.9–3.5)
GLUCOSE SERPL-MCNC: 126 MG/DL (ref 65–99)
GLUCOSE UR STRIP.AUTO-MCNC: NEGATIVE MG/DL
HCT VFR BLD AUTO: 44 % (ref 42–52)
HGB BLD-MCNC: 15.4 G/DL (ref 14–18)
IMM GRANULOCYTES # BLD AUTO: 0.06 K/UL (ref 0–0.11)
IMM GRANULOCYTES NFR BLD AUTO: 0.4 % (ref 0–0.9)
KETONES UR STRIP.AUTO-MCNC: NEGATIVE MG/DL
LEUKOCYTE ESTERASE UR QL STRIP.AUTO: NEGATIVE
LYMPHOCYTES # BLD AUTO: 1.23 K/UL (ref 1–4.8)
LYMPHOCYTES NFR BLD: 8.7 % (ref 22–41)
MCH RBC QN AUTO: 30.1 PG (ref 27–33)
MCHC RBC AUTO-ENTMCNC: 35 G/DL (ref 33.7–35.3)
MCV RBC AUTO: 85.9 FL (ref 81.4–97.8)
MICRO URNS: ABNORMAL
MONOCYTES # BLD AUTO: 1.12 K/UL (ref 0–0.85)
MONOCYTES NFR BLD AUTO: 7.9 % (ref 0–13.4)
NEUTROPHILS # BLD AUTO: 11.52 K/UL (ref 1.82–7.42)
NEUTROPHILS NFR BLD: 81.2 % (ref 44–72)
NITRITE UR QL STRIP.AUTO: NEGATIVE
NRBC # BLD AUTO: 0 K/UL
NRBC BLD-RTO: 0 /100 WBC
PH UR STRIP.AUTO: 6 [PH]
PLATELET # BLD AUTO: 198 K/UL (ref 164–446)
PMV BLD AUTO: 11.6 FL (ref 9–12.9)
POTASSIUM SERPL-SCNC: 3.8 MMOL/L (ref 3.6–5.5)
PROT SERPL-MCNC: 7.1 G/DL (ref 6–8.2)
PROT UR QL STRIP: NEGATIVE MG/DL
RBC # BLD AUTO: 5.12 M/UL (ref 4.7–6.1)
RBC UR QL AUTO: ABNORMAL
SODIUM SERPL-SCNC: 135 MMOL/L (ref 135–145)
SP GR UR STRIP.AUTO: <=1.005
UROBILINOGEN UR STRIP.AUTO-MCNC: 0.2 MG/DL
WBC # BLD AUTO: 14.2 K/UL (ref 4.8–10.8)

## 2019-07-17 PROCEDURE — 85025 COMPLETE CBC W/AUTO DIFF WBC: CPT

## 2019-07-17 PROCEDURE — 74177 CT ABD & PELVIS W/CONTRAST: CPT

## 2019-07-17 PROCEDURE — 700117 HCHG RX CONTRAST REV CODE 255: Performed by: EMERGENCY MEDICINE

## 2019-07-17 PROCEDURE — 99284 EMERGENCY DEPT VISIT MOD MDM: CPT

## 2019-07-17 PROCEDURE — 81001 URINALYSIS AUTO W/SCOPE: CPT

## 2019-07-17 PROCEDURE — 80053 COMPREHEN METABOLIC PANEL: CPT

## 2019-07-17 RX ADMIN — IOHEXOL 100 ML: 350 INJECTION, SOLUTION INTRAVENOUS at 05:53

## 2019-07-17 NOTE — PROGRESS NOTES
"Subjective:      Jamie Bosch Jr. is a 49 y.o. male who presents with Abdominal Pain (lower abdominal pain, constipation, hurts having gas, has a testicular cyst and back injury as well )             This is a new problem.  49-year-old presenting with lower abdominal discomfort and reports that she was having pain when she was having gas.  He denies any fever chills, nausea or vomiting.  He was told at some point in the recent past that he has a testicular cyst for which she has seen urology in the past which is being monitored.  Is been eating otherwise well, appetite is normal.  No fever no chills, no urinary frequency, urgency or dysuria.  Patient states that he was told that he has a hernia by urology.        Review of Systems   All other systems reviewed and are negative.         Objective:     /76   Pulse 95   Temp 37 °C (98.6 °F) (Temporal)   Ht 1.778 m (5' 10\")   Wt 86.6 kg (191 lb)   SpO2 95%   BMI 27.41 kg/m²       Physical Exam   Constitutional: He is oriented to person, place, and time. He appears well-developed and well-nourished.  Non-toxic appearance. No distress.   HENT:   Head: Normocephalic and atraumatic.   Left Ear: External ear normal.   Nose: Nose normal.   Eyes: Conjunctivae are normal.   Neck: No thyromegaly present.   Cardiovascular: Normal rate and regular rhythm.  Exam reveals no gallop and no friction rub.    No murmur heard.  Pulmonary/Chest: Effort normal. No respiratory distress. He has no wheezes. He has no rales.   Abdominal: Soft. Bowel sounds are normal. He exhibits no distension and no mass. There is no hepatosplenomegaly. There is tenderness (Mild) in the left lower quadrant. There is no rigidity, no rebound and no guarding. No hernia. Hernia confirmed negative in the ventral area, confirmed negative in the right inguinal area and confirmed negative in the left inguinal area.   Neurological: He is alert and oriented to person, place, and time.   Skin: Skin is warm. No " rash noted. He is not diaphoretic. No erythema. No pallor.   Psychiatric: He has a normal mood and affect.         Abdominal x-ray showed no signs of obstruction, normal bowel gas pattern on my read       Assessment/Plan:     1. Diverticulitis  - amoxicillin-clavulanate (AUGMENTIN) 875-125 MG Tab; Take 1 Tab by mouth 2 times a day for 10 days.  Dispense: 20 Tab; Refill: 0    2. Abdominal discomfort  - [id number]; Future    We discussed that I do not see any evidence of hernia.  Last CT scan in 2015 showed diverticulosis.  Based on current presentation and past CT he has most likely diverticulitis I recommended him to be treated with oral antibiotic.  We discussed that at this point in time I do not recommend any imaging as most common things happen commonly.  He has never had a history of diverticulitis.  He is okay with the above plan.  Plan per orders and instructions  Warning signs reviewed

## 2019-07-17 NOTE — ED NOTES
Woke up from sleep with 10/10 suprapubic pain. Attempted to urinate -- difficult to start streat of urine. Concurrent L flank pain. Pt was seen at [facility] on 7/16 for same and sent home on abx to treat dx of diverticulitis. Pt reports his symptoms are now worse so pt returned to ED. Denies hx of uti.

## 2019-07-17 NOTE — ED PROVIDER NOTES
ED Provider Note    I seemed care of this patient at approximately 4 AM.  His vital signs were stable.  He had mild symptoms.  I reviewed CT scan that was consistent with diverticulitis without complication.  Patient still appears to be appropriate for outpatient management.  He was given dietary advice.  He will continue his daily antibiotics.  He will follow-up with his primary provider.  Advised that he will need to have a colonoscopy at some point after this acute infection has settled.

## 2019-07-17 NOTE — ED PROVIDER NOTES
ED Provider Note    CHIEF COMPLAINT  Abdominal pain    HPI  Jamie Bosch Jr. is a 49 y.o. male who presents to the emergency department for the evaluation of suprapubic pain.  He states that he has somewhat chronic abdominal pain but this was significantly worse over the last day or so.  He describes it as cramping and located suprapubically.  It does not radiate but is worse when he sits down.  He also admits to some dysuria and back pain which again is more chronic for him.  He was seen earlier today and started on antibiotics for presumed diverticulitis.  He denies any fevers, vomiting, or blood in the stool.  He states that his stools are irregular but this is also his baseline.  He denies any respiratory symptoms, chest pain, shortness of breath, coughing, wheezing, syncope, or headache.  He does have a history of testicular cyst, kidney stones, and diverticulosis.    REVIEW OF SYSTEMS  See HPI for further details. All other systems are negative.     PAST MEDICAL HISTORY   has a past medical history of Bowel habit changes; GERD (gastroesophageal reflux disease); Heart burn; High cholesterol; History of duodenal ulcer; and Indigestion.    SOCIAL HISTORY  Social History     Social History Main Topics   • Smoking status: Former Smoker     Packs/day: 0.20     Types: Cigarettes     Quit date: 1/1/1996   • Smokeless tobacco: Never Used   • Alcohol use 0.0 oz/week      Comment: 1 drink per week    • Drug use: No   • Sexual activity: Yes       SURGICAL HISTORY   has a past surgical history that includes lipoma excision (age 6); repair perf duod/nicholas ulc-wnd/inj (age 27); appendectomy laparoscopic (N/A, 7/23/2015); rj by laparoscopy (9/1/2016); umbilical hernia repair (9/1/2016); and vasectomy.    CURRENT MEDICATIONS  Home Medications    **Home medications have not yet been reviewed for this encounter**         ALLERGIES  Allergies   Allergen Reactions   • Neomycin Swelling       PHYSICAL EXAM  VITAL SIGNS: /97    Pulse 90   Temp 36.8 °C (98.2 °F) (Tympanic)   Resp 20   Wt 90.1 kg (198 lb 10.2 oz)   BMI 28.50 kg/m²    Constitutional: Alert and in no apparent distress.  HENT: Normocephalic atraumatic. Bilateral external ears normal. Nose normal. Mucous membranes are moist.  Eyes: Pupils are equal and reactive. Conjunctiva normal. Non-icteric sclera.   Neck: Normal range of motion without tenderness. Supple. No meningeal signs.  Cardiovascular: Regular rate and rhythm. No murmurs, gallops or rubs.  Thorax & Lungs: Breath sounds are clear to auscultation bilaterally. No wheezing, rhonchi or rales.  Abdomen: Soft, nontender and nondistended. No peritoneal signs noted.  : Chaperone - Benjie, ED RN.  Normal uncircumcised penis.  A well circumcised mass of approximately 1 cm was palpated in the left scrotum.  Patient states that this is the cyst that he has a history of and it is unchanged.  Testicles are nontender bilaterally.  No other obvious masses or abnormalities are noted.  Skin: Warm and dry. No rashes are noted.  Back: No bony tenderness, No CVA tenderness.   Extremities: 2+ peripheral pulses. Cap refill is less than 2 seconds. No edema, cyanosis, or clubbing.  Musculoskeletal: Good range of motion in all major joints. No tenderness to palpation or major deformities noted.   Neurologic: Alert and oriented ×3. The patient moves all 4 extremities and follows commands.  Psychiatric: Affect is normal. Judgment appears to be intact.    DIAGNOSTIC STUDIES / PROCEDURES    LABS  Results for orders placed or performed during the hospital encounter of 07/17/19   Comp Metabolic Panel   Result Value Ref Range    Sodium 135 135 - 145 mmol/L    Potassium 3.8 3.6 - 5.5 mmol/L    Chloride 101 96 - 112 mmol/L    Co2 24 20 - 33 mmol/L    Anion Gap 10.0 0.0 - 11.9    Glucose 126 (H) 65 - 99 mg/dL    Bun 15 8 - 22 mg/dL    Creatinine 0.86 0.50 - 1.40 mg/dL    Calcium 9.5 8.5 - 10.5 mg/dL    AST(SGOT) 17 12 - 45 U/L    ALT(SGPT) 21 2 - 50  U/L    Alkaline Phosphatase 66 30 - 99 U/L    Total Bilirubin 1.5 0.1 - 1.5 mg/dL    Albumin 4.5 3.2 - 4.9 g/dL    Total Protein 7.1 6.0 - 8.2 g/dL    Globulin 2.6 1.9 - 3.5 g/dL    A-G Ratio 1.7 g/dL   CBC WITH DIFFERENTIAL   Result Value Ref Range    WBC 14.2 (H) 4.8 - 10.8 K/uL    RBC 5.12 4.70 - 6.10 M/uL    Hemoglobin 15.4 14.0 - 18.0 g/dL    Hematocrit 44.0 42.0 - 52.0 %    MCV 85.9 81.4 - 97.8 fL    MCH 30.1 27.0 - 33.0 pg    MCHC 35.0 33.7 - 35.3 g/dL    RDW 41.5 35.9 - 50.0 fL    Platelet Count 198 164 - 446 K/uL    MPV 11.6 9.0 - 12.9 fL    Neutrophils-Polys 81.20 (H) 44.00 - 72.00 %    Lymphocytes 8.70 (L) 22.00 - 41.00 %    Monocytes 7.90 0.00 - 13.40 %    Eosinophils 1.60 0.00 - 6.90 %    Basophils 0.20 0.00 - 1.80 %    Immature Granulocytes 0.40 0.00 - 0.90 %    Nucleated RBC 0.00 /100 WBC    Neutrophils (Absolute) 11.52 (H) 1.82 - 7.42 K/uL    Lymphs (Absolute) 1.23 1.00 - 4.80 K/uL    Monos (Absolute) 1.12 (H) 0.00 - 0.85 K/uL    Eos (Absolute) 0.23 0.00 - 0.51 K/uL    Baso (Absolute) 0.03 0.00 - 0.12 K/uL    Immature Granulocytes (abs) 0.06 0.00 - 0.11 K/uL    NRBC (Absolute) 0.00 K/uL   URINALYSIS   Result Value Ref Range    Color Yellow     Character Clear     Specific Gravity <=1.005 <1.035    Ph 6.0 5.0 - 8.0    Glucose Negative Negative mg/dL    Ketones Negative Negative mg/dL    Protein Negative Negative mg/dL    Bilirubin Negative Negative    Urobilinogen, Urine 0.2 Negative    Nitrite Negative Negative    Leukocyte Esterase Negative Negative    Occult Blood Trace (A) Negative    Micro Urine Req Microscopic    ESTIMATED GFR   Result Value Ref Range    GFR If African American >60 >60 mL/min/1.73 m 2    GFR If Non African American >60 >60 mL/min/1.73 m 2   URINE MICROSCOPIC (W/UA)   Result Value Ref Range    Amorphous Crystal Present /hpf     RADIOLOGY  CT-ABDOMEN-PELVIS WITH    (Results Pending)     COURSE & MEDICAL DECISION MAKING  Pertinent Labs & Imaging studies reviewed. (See chart for  details)    This is a 49-year-old male presenting to the emergency department for the evaluation of abdominal pain.  On initial evaluation, the patient appeared comfortable and was lying on the gurney.  His abdominal exam was benign aside from a small mass palpated in the left scrotum.  Patient confirmed that this was his cyst which is unchanged.  He is following with urology for this.  An IV was established and blood work was sent.  The patient did have a leukocytosis of 14 with a leftward shift.  Metabolic panel including LFTs was normal.  Urinalysis revealed trace blood but no nitrites or leukocyte estrase.  I have low clinical suspicion for infection at this time.  Given his persistent symptoms, a CT of the abdomen and pelvis was ordered. The patient was signed out to my partner, Dr Gomez, pending the CT and final disposition.     Electronically signed by: Regine Rodriguez, 7/17/2019 4:02 AM

## 2019-07-30 ENCOUNTER — OFFICE VISIT (OUTPATIENT)
Dept: MEDICAL GROUP | Facility: PHYSICIAN GROUP | Age: 49
End: 2019-07-30
Payer: COMMERCIAL

## 2019-07-30 VITALS
OXYGEN SATURATION: 97 % | SYSTOLIC BLOOD PRESSURE: 118 MMHG | RESPIRATION RATE: 14 BRPM | HEART RATE: 76 BPM | DIASTOLIC BLOOD PRESSURE: 80 MMHG | BODY MASS INDEX: 27.35 KG/M2 | HEIGHT: 70 IN | TEMPERATURE: 98.6 F | WEIGHT: 191 LBS

## 2019-07-30 DIAGNOSIS — K57.32 SIGMOID DIVERTICULITIS: ICD-10-CM

## 2019-07-30 PROCEDURE — 99214 OFFICE O/P EST MOD 30 MIN: CPT | Performed by: FAMILY MEDICINE

## 2019-07-30 NOTE — PROGRESS NOTES
Chief Complaint   Patient presents with   • Diverticulitis     fv        HISTORY OF PRESENT ILLNESS: Patient is a 49 y.o. male established patient here today for the following concerns:    1. Sigmoid diverticulitis  This is a pleasant 49-year-old gentleman with long-standing history of intermittent abdominal pain and a regular bowel habit since childhood.  He reports that a couple weeks ago developed severe bilateral lower quadrant abdominal pain as well as dysuria.  He was seen in urgent care had a KUB done which was unremarkable.  He was sent home with diagnosis of possible diverticulitis and observation.  His symptoms worsened with fever chills and sweats and worsening pain.  He presented to the emergency department as directed and underwent CT imaging demonstrating sigmoid diverticulitis.  Started on Augmentin for 10-day course.  Reports that his abdominal pain is resolved.  No fevers chills.  He does need colonoscopy follow-up to ensure that this is only what he is dealing with.  Reports that bowel habits have returned to his regular normal.  No melena or hematochezia.  No further fevers or chills.      Past Medical, Social, and Family history reviewed and updated in EPIC    Allergies:Neomycin    Current Outpatient Prescriptions   Medication Sig Dispense Refill   • rosuvastatin (CRESTOR) 40 MG tablet TAKE 1 TABLET BY MOUTH EVERY DAY 90 Tab 0   • pantoprazole (PROTONIX) 40 MG Tablet Delayed Response Take 1 Tab by mouth every day. 90 Tab 3   • magnesium chloride (MAG-64) 64 MG Tablet Delayed Response Take 64 mg by mouth every day.       No current facility-administered medications for this visit.          ROS:  Review of Systems   Constitutional: Negative for fever, chills, weight loss and malaise/fatigue.   HENT: Negative for ear pain, nosebleeds, congestion, sore throat and neck pain.    Eyes: Negative for blurred vision.   Respiratory: Negative for cough, sputum production, shortness of breath and wheezing.   "  Cardiovascular: Negative for chest pain, palpitations,  and leg swelling.   Gastrointestinal: Negative for heartburn, nausea, vomiting, diarrhea and abdominal pain.   Genitourinary: Negative for dysuria, urgency and frequency.   Musculoskeletal: Negative for myalgias, back pain and joint pain.   Skin: Negative for rash and itching.   Neurological: Negative for dizziness, tingling, tremors, sensory change, focal weakness and headaches.   Endo/Heme/Allergies: Does not bruise/bleed easily.   Psychiatric/Behavioral: Negative for depression, anxiety, suicidal ideas, insomnia and memory loss.      Exam:  /80 (BP Location: Right arm, Patient Position: Sitting, BP Cuff Size: Adult)   Pulse 76   Temp 37 °C (98.6 °F)   Resp 14   Ht 1.778 m (5' 10\")   Wt 86.6 kg (191 lb)   SpO2 97%     General:  Well nourished, well developed in NAD  Head is grossly normal.  Neck: Supple without JVD   Pulmonary:  Normal effort.   Cardiovascular: Regular rate  Extremities: no clubbing, cyanosis, or edema.  Psych: affect appropriate  Abdomen: positive bowel sounds.  Non tender, non distended, no hepatosplenomegaly.       Please note that this dictation was created using voice recognition software. I have made every reasonable attempt to correct obvious errors, but I expect that there are errors of grammar and possibly content that I did not discover before finalizing the note.    Assessment/Plan:  1. Sigmoid diverticulitis  Increase hydration and fiber, may take fiber supplement if unable to do this through diet alone.  If he develops any return of fever chills abdominal pain he is to notify us immediately or if after hours seek attention  - REFERRAL TO GASTROENTEROLOGY    Follow-up as needed sooner as needed        "

## 2019-09-10 DIAGNOSIS — E78.49 FAMILIAL HYPERLIPIDEMIA: ICD-10-CM

## 2019-09-11 RX ORDER — ROSUVASTATIN CALCIUM 40 MG/1
TABLET, COATED ORAL
Qty: 90 TAB | Refills: 0 | Status: SHIPPED | OUTPATIENT
Start: 2019-09-11 | End: 2020-04-20

## 2019-09-11 NOTE — TELEPHONE ENCOUNTER
Was the patient seen in the last year in this department? Yes    Does patient have an active prescription for medications requested? No     Received Request Via: Pharmacy    Pt met protocol?: Yes     Last OV 07/30/19    Lab Results   Component Value Date/Time    CHOLSTRLTOT 167 02/04/2019 1311    TRIGLYCERIDE 925 (H) 02/04/2019 1311    HDL 23 (A) 02/04/2019 1311    LDL see below 02/04/2019 1311

## 2019-10-07 ENCOUNTER — HOSPITAL ENCOUNTER (OUTPATIENT)
Dept: RADIOLOGY | Facility: MEDICAL CENTER | Age: 49
End: 2019-10-07
Attending: UROLOGY
Payer: COMMERCIAL

## 2019-10-07 DIAGNOSIS — N43.40 SPERMATOCELE: ICD-10-CM

## 2019-10-07 DIAGNOSIS — N50.82 SCROTAL PAIN: ICD-10-CM

## 2019-10-07 PROCEDURE — 76870 US EXAM SCROTUM: CPT

## 2019-11-09 ENCOUNTER — OFFICE VISIT (OUTPATIENT)
Dept: URGENT CARE | Facility: CLINIC | Age: 49
End: 2019-11-09
Payer: COMMERCIAL

## 2019-11-09 VITALS
TEMPERATURE: 98.2 F | DIASTOLIC BLOOD PRESSURE: 58 MMHG | BODY MASS INDEX: 27.41 KG/M2 | OXYGEN SATURATION: 97 % | SYSTOLIC BLOOD PRESSURE: 112 MMHG | WEIGHT: 195.8 LBS | HEIGHT: 71 IN | RESPIRATION RATE: 20 BRPM | HEART RATE: 98 BPM

## 2019-11-09 DIAGNOSIS — R10.13 EPIGASTRIC ABDOMINAL PAIN: ICD-10-CM

## 2019-11-09 LAB
APPEARANCE UR: CLEAR
BILIRUB UR STRIP-MCNC: NORMAL MG/DL
COLOR UR AUTO: NORMAL
GLUCOSE UR STRIP.AUTO-MCNC: NORMAL MG/DL
KETONES UR STRIP.AUTO-MCNC: NORMAL MG/DL
LEUKOCYTE ESTERASE UR QL STRIP.AUTO: NORMAL
NITRITE UR QL STRIP.AUTO: NORMAL
PH UR STRIP.AUTO: 5.5 [PH] (ref 5–8)
PROT UR QL STRIP: NORMAL MG/DL
RBC UR QL AUTO: NORMAL
SP GR UR STRIP.AUTO: 1.01
UROBILINOGEN UR STRIP-MCNC: 0.2 MG/DL

## 2019-11-09 PROCEDURE — 99213 OFFICE O/P EST LOW 20 MIN: CPT | Performed by: NURSE PRACTITIONER

## 2019-11-09 PROCEDURE — 81002 URINALYSIS NONAUTO W/O SCOPE: CPT | Performed by: NURSE PRACTITIONER

## 2019-11-09 RX ORDER — FAMOTIDINE 20 MG/1
20 TABLET, FILM COATED ORAL 2 TIMES DAILY
Qty: 60 TAB | Refills: 0 | Status: SHIPPED | OUTPATIENT
Start: 2019-11-09 | End: 2021-12-02

## 2019-11-09 ASSESSMENT — ENCOUNTER SYMPTOMS
BLOOD IN STOOL: 0
ABDOMINAL PAIN: 1
NAUSEA: 1
VOMITING: 0
CHILLS: 0
DIARRHEA: 0
HEADACHES: 0
FEVER: 0
CONSTIPATION: 0
MYALGIAS: 0

## 2019-11-09 ASSESSMENT — PAIN SCALES - GENERAL: PAINLEVEL: 4=SLIGHT-MODERATE PAIN

## 2019-11-10 NOTE — PROGRESS NOTES
Subjective:      Jamie Bosch Jr. is a 49 y.o. male who presents with Abdominal Pain (upper-left side of abdomen, had diverticulitis before, concerned about a hernia, burping a lot, feels bloaded on his upper-left side of abdomen x 1 month)            HPI New. 49 year old male with ulcer history presents with one month history of epigastric pain, belching and bloating. He has some nausea with this. Denies diarrhea or constipation. States does not feel like when he had bleeding ulcers. He has no gall bladder or appendix. Denies fever, chills or vomiting. BM's are ok. Pain does not radiate to back. He is on 40 mg protonix daily. Missed GI appt. Has f/u on  with them.  Neomycin  Current Outpatient Medications on File Prior to Visit   Medication Sig Dispense Refill   • rosuvastatin (CRESTOR) 40 MG tablet TAKE 1 TABLET BY MOUTH EVERY DAY 90 Tab 0   • magnesium chloride (MAG-64) 64 MG Tablet Delayed Response Take 64 mg by mouth every day.     • pantoprazole (PROTONIX) 40 MG Tablet Delayed Response Take 1 Tab by mouth every day. 90 Tab 3     No current facility-administered medications on file prior to visit.      Social History     Socioeconomic History   • Marital status:      Spouse name: Not on file   • Number of children: Not on file   • Years of education: Not on file   • Highest education level: Not on file   Occupational History   • Not on file   Social Needs   • Financial resource strain: Not on file   • Food insecurity:     Worry: Not on file     Inability: Not on file   • Transportation needs:     Medical: Not on file     Non-medical: Not on file   Tobacco Use   • Smoking status: Former Smoker     Packs/day: 0.20     Types: Cigarettes     Last attempt to quit: 1996     Years since quittin.8   • Smokeless tobacco: Never Used   Substance and Sexual Activity   • Alcohol use: Yes     Alcohol/week: 0.0 oz     Comment: occ   • Drug use: No   • Sexual activity: Yes   Lifestyle   • Physical  "activity:     Days per week: Not on file     Minutes per session: Not on file   • Stress: Not on file   Relationships   • Social connections:     Talks on phone: Not on file     Gets together: Not on file     Attends Baptism service: Not on file     Active member of club or organization: Not on file     Attends meetings of clubs or organizations: Not on file     Relationship status: Not on file   • Intimate partner violence:     Fear of current or ex partner: Not on file     Emotionally abused: Not on file     Physically abused: Not on file     Forced sexual activity: Not on file   Other Topics Concern   •  Service No   • Blood Transfusions Yes   • Caffeine Concern No   • Occupational Exposure Yes   • Hobby Hazards No   • Sleep Concern No   • Stress Concern No   • Weight Concern No   • Special Diet No   • Back Care Yes   • Exercise Yes   • Bike Helmet Yes   • Seat Belt Yes   • Self-Exams Yes   Social History Narrative   • Not on file     Breast Cancer-related family history is not on file.      Review of Systems   Constitutional: Negative for chills and fever.   Gastrointestinal: Positive for abdominal pain and nausea. Negative for blood in stool, constipation, diarrhea and vomiting.   Genitourinary: Negative.    Musculoskeletal: Negative for myalgias.   Neurological: Negative for headaches.          Objective:     /58 (BP Location: Right arm, Patient Position: Sitting, BP Cuff Size: Adult)   Pulse 98   Temp 36.8 °C (98.2 °F) (Temporal)   Resp 20   Ht 1.803 m (5' 11\")   Wt 88.8 kg (195 lb 12.8 oz)   SpO2 97%   BMI 27.31 kg/m²      Physical Exam  Vitals signs and nursing note reviewed.   Constitutional:       General: He is not in acute distress.     Appearance: He is well-developed.   Cardiovascular:      Rate and Rhythm: Normal rate and regular rhythm.      Heart sounds: Normal heart sounds. No murmur.   Pulmonary:      Effort: Pulmonary effort is normal. No respiratory distress.      Breath " sounds: Normal breath sounds.   Abdominal:      General: Bowel sounds are normal.      Palpations: Abdomen is soft.      Tenderness: There is tenderness in the epigastric area.   Musculoskeletal: Normal range of motion.      Comments: Moves all 4 extremities normally   Skin:     General: Skin is warm and dry.   Neurological:      Mental Status: He is alert and oriented to person, place, and time.   Psychiatric:         Behavior: Behavior normal.         Thought Content: Thought content normal.                 Assessment/Plan:     1. Epigastric abdominal pain  famotidine (PEPCID) 20 MG Tab    Comp Metabolic Panel    AMYLASE    LIPASE    CBC WITH DIFFERENTIAL   pepcid.  Needs f/u with GI but at this time non emergent for us.  Labs.

## 2019-11-14 ENCOUNTER — HOSPITAL ENCOUNTER (OUTPATIENT)
Dept: LAB | Facility: MEDICAL CENTER | Age: 49
End: 2019-11-14
Attending: NURSE PRACTITIONER
Payer: COMMERCIAL

## 2019-11-14 DIAGNOSIS — R10.13 EPIGASTRIC ABDOMINAL PAIN: ICD-10-CM

## 2019-11-14 LAB
ALBUMIN SERPL BCP-MCNC: 4.7 G/DL (ref 3.2–4.9)
ALBUMIN/GLOB SERPL: 1.8 G/DL
ALP SERPL-CCNC: 98 U/L (ref 30–99)
ALT SERPL-CCNC: 54 U/L (ref 2–50)
AMYLASE SERPL-CCNC: 36 U/L (ref 20–103)
ANION GAP SERPL CALC-SCNC: 11 MMOL/L (ref 0–11.9)
AST SERPL-CCNC: 19 U/L (ref 12–45)
BASOPHILS # BLD AUTO: 0.3 % (ref 0–1.8)
BASOPHILS # BLD: 0.03 K/UL (ref 0–0.12)
BILIRUB SERPL-MCNC: 1.3 MG/DL (ref 0.1–1.5)
BUN SERPL-MCNC: 23 MG/DL (ref 8–22)
CALCIUM SERPL-MCNC: 9 MG/DL (ref 8.5–10.5)
CHLORIDE SERPL-SCNC: 103 MMOL/L (ref 96–112)
CO2 SERPL-SCNC: 23 MMOL/L (ref 20–33)
CREAT SERPL-MCNC: 0.82 MG/DL (ref 0.5–1.4)
EOSINOPHIL # BLD AUTO: 0.31 K/UL (ref 0–0.51)
EOSINOPHIL NFR BLD: 2.7 % (ref 0–6.9)
ERYTHROCYTE [DISTWIDTH] IN BLOOD BY AUTOMATED COUNT: 42.3 FL (ref 35.9–50)
FASTING STATUS PATIENT QL REPORTED: NORMAL
GLOBULIN SER CALC-MCNC: 2.6 G/DL (ref 1.9–3.5)
GLUCOSE SERPL-MCNC: 112 MG/DL (ref 65–99)
HCT VFR BLD AUTO: 45.2 % (ref 42–52)
HGB BLD-MCNC: 15.4 G/DL (ref 14–18)
IMM GRANULOCYTES # BLD AUTO: 0.08 K/UL (ref 0–0.11)
IMM GRANULOCYTES NFR BLD AUTO: 0.7 % (ref 0–0.9)
LIPASE SERPL-CCNC: 31 U/L (ref 11–82)
LYMPHOCYTES # BLD AUTO: 1.88 K/UL (ref 1–4.8)
LYMPHOCYTES NFR BLD: 16.3 % (ref 22–41)
MCH RBC QN AUTO: 30 PG (ref 27–33)
MCHC RBC AUTO-ENTMCNC: 34.1 G/DL (ref 33.7–35.3)
MCV RBC AUTO: 87.9 FL (ref 81.4–97.8)
MONOCYTES # BLD AUTO: 0.88 K/UL (ref 0–0.85)
MONOCYTES NFR BLD AUTO: 7.6 % (ref 0–13.4)
NEUTROPHILS # BLD AUTO: 8.33 K/UL (ref 1.82–7.42)
NEUTROPHILS NFR BLD: 72.4 % (ref 44–72)
NRBC # BLD AUTO: 0 K/UL
NRBC BLD-RTO: 0 /100 WBC
PLATELET # BLD AUTO: 272 K/UL (ref 164–446)
PMV BLD AUTO: 11.4 FL (ref 9–12.9)
POTASSIUM SERPL-SCNC: 4 MMOL/L (ref 3.6–5.5)
PROT SERPL-MCNC: 7.3 G/DL (ref 6–8.2)
RBC # BLD AUTO: 5.14 M/UL (ref 4.7–6.1)
SODIUM SERPL-SCNC: 137 MMOL/L (ref 135–145)
WBC # BLD AUTO: 11.5 K/UL (ref 4.8–10.8)

## 2019-11-14 PROCEDURE — 85025 COMPLETE CBC W/AUTO DIFF WBC: CPT

## 2019-11-14 PROCEDURE — 82150 ASSAY OF AMYLASE: CPT

## 2019-11-14 PROCEDURE — 80053 COMPREHEN METABOLIC PANEL: CPT

## 2019-11-14 PROCEDURE — 36415 COLL VENOUS BLD VENIPUNCTURE: CPT

## 2019-11-14 PROCEDURE — 83690 ASSAY OF LIPASE: CPT

## 2020-02-03 ENCOUNTER — NON-PROVIDER VISIT (OUTPATIENT)
Dept: MEDICAL GROUP | Facility: PHYSICIAN GROUP | Age: 50
End: 2020-02-03
Payer: COMMERCIAL

## 2020-02-03 DIAGNOSIS — Z23 NEED FOR VACCINATION: ICD-10-CM

## 2020-02-03 PROCEDURE — 90686 IIV4 VACC NO PRSV 0.5 ML IM: CPT | Performed by: FAMILY MEDICINE

## 2020-02-03 PROCEDURE — 90471 IMMUNIZATION ADMIN: CPT | Performed by: FAMILY MEDICINE

## 2020-02-03 NOTE — PROGRESS NOTES
"Jamie Bosch Jr. is a 49 y.o. male here for a non-provider visit for:   FLU    Reason for immunization: Annual Flu Vaccine  Immunization records indicate need for vaccine: Yes, confirmed with Epic and confirmed with [facility]  Minimum interval has been met for this vaccine: Yes  ABN completed: Not Indicated    Order and dose verified by: NRAESH  VIS Dated  08/15/2019 was given to patient: Yes  All IAC Questionnaire questions were answered \"No.\"    Patient tolerated injection and no adverse effects were observed or reported: Yes    Pt scheduled for next dose in series: No    "

## 2020-03-02 ENCOUNTER — HOSPITAL ENCOUNTER (OUTPATIENT)
Dept: LAB | Facility: MEDICAL CENTER | Age: 50
End: 2020-03-02
Attending: INTERNAL MEDICINE
Payer: COMMERCIAL

## 2020-03-02 PROCEDURE — 36415 COLL VENOUS BLD VENIPUNCTURE: CPT

## 2020-03-02 PROCEDURE — 80076 HEPATIC FUNCTION PANEL: CPT

## 2020-03-03 LAB
ALBUMIN SERPL BCP-MCNC: 4.2 G/DL (ref 3.2–4.9)
ALP SERPL-CCNC: 72 U/L (ref 30–99)
ALT SERPL-CCNC: 37 U/L (ref 2–50)
AST SERPL-CCNC: 21 U/L (ref 12–45)
BILIRUB CONJ SERPL-MCNC: 0.2 MG/DL (ref 0.1–0.5)
BILIRUB INDIRECT SERPL-MCNC: 1.1 MG/DL (ref 0–1)
BILIRUB SERPL-MCNC: 1.3 MG/DL (ref 0.1–1.5)
PROT SERPL-MCNC: 6.6 G/DL (ref 6–8.2)

## 2020-04-17 DIAGNOSIS — E78.49 FAMILIAL HYPERLIPIDEMIA: ICD-10-CM

## 2020-04-17 DIAGNOSIS — K21.9 GASTROESOPHAGEAL REFLUX DISEASE, ESOPHAGITIS PRESENCE NOT SPECIFIED: ICD-10-CM

## 2020-04-17 NOTE — TELEPHONE ENCOUNTER
Was the patient seen in the last year in this department? Yes    Does patient have an active prescription for medications requested? No     Received Request Via: Pharmacy      Pt met protocol?: Yes, OV 7/19   Lab Results   Component Value Date/Time    CHOLSTRLTOT 167 02/04/2019 01:11 PM    LDL see below 02/04/2019 01:11 PM    HDL 23 (A) 02/04/2019 01:11 PM    TRIGLYCERIDE 925 (H) 02/04/2019 01:11 PM       Lab Results   Component Value Date/Time    SODIUM 137 11/14/2019 06:26 AM    POTASSIUM 4.0 11/14/2019 06:26 AM    CHLORIDE 103 11/14/2019 06:26 AM    CO2 23 11/14/2019 06:26 AM    GLUCOSE 112 (H) 11/14/2019 06:26 AM    BUN 23 (H) 11/14/2019 06:26 AM    CREATININE 0.82 11/14/2019 06:26 AM     Lab Results   Component Value Date/Time    ALKPHOSPHAT 72 03/02/2020 03:22 PM    ASTSGOT 21 03/02/2020 03:22 PM    ALTSGPT 37 03/02/2020 03:22 PM    TBILIRUBIN 1.3 03/02/2020 03:22 PM

## 2020-04-20 RX ORDER — ROSUVASTATIN CALCIUM 40 MG/1
TABLET, COATED ORAL
Qty: 90 TAB | Refills: 0 | Status: SHIPPED | OUTPATIENT
Start: 2020-04-20 | End: 2020-07-15

## 2020-04-20 RX ORDER — PANTOPRAZOLE SODIUM 40 MG/1
TABLET, DELAYED RELEASE ORAL
Qty: 90 TAB | Refills: 0 | Status: SHIPPED | OUTPATIENT
Start: 2020-04-20 | End: 2020-07-14

## 2020-06-03 ENCOUNTER — OFFICE VISIT (OUTPATIENT)
Dept: MEDICAL GROUP | Facility: PHYSICIAN GROUP | Age: 50
End: 2020-06-03
Payer: COMMERCIAL

## 2020-06-03 ENCOUNTER — HOSPITAL ENCOUNTER (OUTPATIENT)
Dept: LAB | Facility: MEDICAL CENTER | Age: 50
End: 2020-06-03
Attending: FAMILY MEDICINE
Payer: COMMERCIAL

## 2020-06-03 VITALS
RESPIRATION RATE: 14 BRPM | HEART RATE: 80 BPM | SYSTOLIC BLOOD PRESSURE: 122 MMHG | HEIGHT: 71 IN | WEIGHT: 189.8 LBS | BODY MASS INDEX: 26.57 KG/M2 | OXYGEN SATURATION: 95 % | TEMPERATURE: 97.8 F | DIASTOLIC BLOOD PRESSURE: 88 MMHG

## 2020-06-03 DIAGNOSIS — G89.29 CHRONIC ABDOMINAL PAIN: ICD-10-CM

## 2020-06-03 DIAGNOSIS — R10.9 CHRONIC ABDOMINAL PAIN: ICD-10-CM

## 2020-06-03 DIAGNOSIS — K52.9 CHRONIC DIARRHEA: ICD-10-CM

## 2020-06-03 DIAGNOSIS — K86.89 PANCREATIC INSUFFICIENCY: ICD-10-CM

## 2020-06-03 DIAGNOSIS — E78.49 FAMILIAL HYPERLIPIDEMIA: ICD-10-CM

## 2020-06-03 LAB
25(OH)D3 SERPL-MCNC: 23 NG/ML (ref 30–100)
ALBUMIN SERPL BCP-MCNC: 4.7 G/DL (ref 3.2–4.9)
ALBUMIN/GLOB SERPL: 1.8 G/DL
ALP SERPL-CCNC: 85 U/L (ref 30–99)
ALT SERPL-CCNC: 22 U/L (ref 2–50)
AMYLASE SERPL-CCNC: 108 U/L (ref 20–103)
ANION GAP SERPL CALC-SCNC: 13 MMOL/L (ref 7–16)
AST SERPL-CCNC: 20 U/L (ref 12–45)
BILIRUB SERPL-MCNC: 1.5 MG/DL (ref 0.1–1.5)
BUN SERPL-MCNC: 17 MG/DL (ref 8–22)
CALCIUM SERPL-MCNC: 9.9 MG/DL (ref 8.5–10.5)
CHLORIDE SERPL-SCNC: 102 MMOL/L (ref 96–112)
CHOLEST SERPL-MCNC: 106 MG/DL (ref 100–199)
CO2 SERPL-SCNC: 25 MMOL/L (ref 20–33)
CREAT SERPL-MCNC: 0.91 MG/DL (ref 0.5–1.4)
FASTING STATUS PATIENT QL REPORTED: NORMAL
GLOBULIN SER CALC-MCNC: 2.6 G/DL (ref 1.9–3.5)
GLUCOSE SERPL-MCNC: 105 MG/DL (ref 65–99)
HDLC SERPL-MCNC: 29 MG/DL
LDLC SERPL CALC-MCNC: 17 MG/DL
LIPASE SERPL-CCNC: 28 U/L (ref 11–82)
POTASSIUM SERPL-SCNC: 4.4 MMOL/L (ref 3.6–5.5)
PROT SERPL-MCNC: 7.3 G/DL (ref 6–8.2)
SODIUM SERPL-SCNC: 140 MMOL/L (ref 135–145)
TRIGL SERPL-MCNC: 298 MG/DL (ref 0–149)
VIT B12 SERPL-MCNC: 751 PG/ML (ref 211–911)

## 2020-06-03 PROCEDURE — 80061 LIPID PANEL: CPT

## 2020-06-03 PROCEDURE — 36415 COLL VENOUS BLD VENIPUNCTURE: CPT

## 2020-06-03 PROCEDURE — 83690 ASSAY OF LIPASE: CPT

## 2020-06-03 PROCEDURE — 81404 MOPATH PROCEDURE LEVEL 5: CPT

## 2020-06-03 PROCEDURE — 82306 VITAMIN D 25 HYDROXY: CPT

## 2020-06-03 PROCEDURE — 99214 OFFICE O/P EST MOD 30 MIN: CPT | Performed by: FAMILY MEDICINE

## 2020-06-03 PROCEDURE — 84597 ASSAY OF VITAMIN K: CPT

## 2020-06-03 PROCEDURE — 80053 COMPREHEN METABOLIC PANEL: CPT

## 2020-06-03 PROCEDURE — 81479 UNLISTED MOLECULAR PATHOLOGY: CPT

## 2020-06-03 PROCEDURE — 82150 ASSAY OF AMYLASE: CPT

## 2020-06-03 PROCEDURE — 82607 VITAMIN B-12: CPT

## 2020-06-03 PROCEDURE — 84590 ASSAY OF VITAMIN A: CPT

## 2020-06-03 RX ORDER — PANTOPRAZOLE SODIUM 40 MG/1
TABLET, DELAYED RELEASE ORAL
COMMUNITY
Start: 2020-06-01 | End: 2020-06-02

## 2020-06-03 RX ORDER — FAMOTIDINE 20 MG/1
TABLET, FILM COATED ORAL
COMMUNITY
Start: 2020-06-01 | End: 2020-06-02

## 2020-06-03 RX ORDER — POLYETHYLENE GLYCOL-3350 AND ELECTROLYTES 236; 6.74; 5.86; 2.97; 22.74 G/274.31G; G/274.31G; G/274.31G; G/274.31G; G/274.31G
POWDER, FOR SOLUTION ORAL
COMMUNITY
Start: 2020-06-01 | End: 2020-06-02

## 2020-06-03 RX ORDER — ROSUVASTATIN CALCIUM 40 MG/1
TABLET, COATED ORAL
COMMUNITY
Start: 2020-06-01 | End: 2020-06-02

## 2020-06-03 RX ORDER — AMOXICILLIN AND CLAVULANATE POTASSIUM 875; 125 MG/1; MG/1
TABLET, FILM COATED ORAL
COMMUNITY
Start: 2020-06-01 | End: 2020-06-02

## 2020-06-03 ASSESSMENT — PATIENT HEALTH QUESTIONNAIRE - PHQ9: CLINICAL INTERPRETATION OF PHQ2 SCORE: 0

## 2020-06-03 ASSESSMENT — FIBROSIS 4 INDEX: FIB4 SCORE: 0.62

## 2020-06-03 NOTE — PROGRESS NOTES
Chief Complaint   Patient presents with   • Gastrophageal Reflux   • Follow-Up     labs       HISTORY OF PRESENT ILLNESS: Patient is a 50 y.o. male established patient here today for the following concerns:    1. Pancreatic insufficiency  2. Chronic diarrhea  3. Chronic abdominal pain  This is a pleasant 50 year old male who has had life long issues with digestion, heart burn and diarrhea.  He has undergone, cholecystectomy, appendectomy, umbilical hernia repair, repair of perforated duodenal and gastric ulcers.  He reports that he still gets some epigastric pain at times and heart burn, but it has changed over the years.  He has hx of NAFLD and hx of diverticulitis.  He is now wondering after doing some research is some of his symptoms could be from pancreatic insufficiency as he heard this on the radio and felt that much of his symptoms match.  He often gets bloating, gas, diarrhea, abdominal pain and cramping.        Past Medical, Social, and Family history reviewed and updated in EPIC    Allergies:Neomycin    Current Outpatient Medications   Medication Sig Dispense Refill   • rosuvastatin (CRESTOR) 40 MG tablet TAKE 1 TABLET BY MOUTH EVERY DAY 90 Tab 0   • pantoprazole (PROTONIX) 40 MG Tablet Delayed Response TAKE 1 TABLET BY MOUTH EVERY DAY 90 Tab 0   • famotidine (PEPCID) 20 MG Tab Take 1 Tab by mouth 2 times a day. (Patient not taking: Reported on 6/3/2020) 60 Tab 0   • magnesium chloride (MAG-64) 64 MG Tablet Delayed Response Take 64 mg by mouth every day.       No current facility-administered medications for this visit.          ROS:  Review of Systems   Constitutional: Negative for fever, chills, weight loss and malaise/fatigue.   HENT: Negative for ear pain, nosebleeds, congestion, sore throat and neck pain.    Eyes: Negative for blurred vision.   Respiratory: Negative for cough, sputum production, shortness of breath and wheezing.    Cardiovascular: Negative for chest pain, palpitations,  and leg  "swelling.   Gastrointestinal: Negative for heartburn, nausea, vomiting, diarrhea and abdominal pain.   Genitourinary: Negative for dysuria, urgency and frequency.   Musculoskeletal: Negative for myalgias, back pain and joint pain.   Skin: Negative for rash and itching.   Neurological: Negative for dizziness, tingling, tremors, sensory change, focal weakness and headaches.   Endo/Heme/Allergies: Does not bruise/bleed easily.   Psychiatric/Behavioral: Negative for depression, anxiety, suicidal ideas, insomnia and memory loss.      Exam:  /88   Pulse 80   Temp 36.6 °C (97.8 °F)   Resp 14   Ht 1.803 m (5' 11\")   Wt 86.1 kg (189 lb 12.8 oz)   SpO2 95%     General:  Well nourished, well developed in NAD  Head is grossly normal.  Neck: Supple without JVD   Pulmonary:  Normal effort.   Cardiovascular: Regular rate  Extremities: no clubbing, cyanosis, or edema.  Psych: affect appropriate      Please note that this dictation was created using voice recognition software. I have made every reasonable attempt to correct obvious errors, but I expect that there are errors of grammar and possibly content that I did not discover before finalizing the note.    Assessment/Plan:  1. Pancreatic insufficiency  Will check for signs of pancreatic insufficiency.  Continue PPI therapy for hx of ulcers.  Health diet, healthy weight.   - MISCELLANEOUS LAB TEST ([facility]/Other); Future  - VITAMIN D,25 HYDROXY; Future  - VITAMIN K; Future  - VITAMIN A; Future  - VITAMIN B12; Future  - AMYLASE; Future  - LIPASE; Future    2. Chronic diarrhea  3. Chronic abdominal pain  Unclear etiology.  Keep follow up with GI.  Most recent LFTs reviewed and normal.             "

## 2020-06-04 DIAGNOSIS — K86.1 IDIOPATHIC CHRONIC PANCREATITIS (HCC): ICD-10-CM

## 2020-06-06 LAB — PHYTONADIONE SERPL-MCNC: 2.79 NMOL/L (ref 0.22–4.88)

## 2020-06-07 ENCOUNTER — HOSPITAL ENCOUNTER (OUTPATIENT)
Facility: MEDICAL CENTER | Age: 50
End: 2020-06-07
Attending: FAMILY MEDICINE
Payer: COMMERCIAL

## 2020-06-07 LAB
ANNOTATION COMMENT IMP: NORMAL
RETINYL PALMITATE SERPL-MCNC: <0.02 MG/L (ref 0–0.1)
VIT A SERPL-MCNC: 0.76 MG/L (ref 0.3–1.2)

## 2020-06-07 PROCEDURE — 84311 SPECTROPHOTOMETRY: CPT

## 2020-06-07 PROCEDURE — 83520 IMMUNOASSAY QUANT NOS NONAB: CPT

## 2020-06-08 ENCOUNTER — HOSPITAL ENCOUNTER (OUTPATIENT)
Facility: MEDICAL CENTER | Age: 50
End: 2020-06-08
Attending: FAMILY MEDICINE
Payer: COMMERCIAL

## 2020-06-08 PROCEDURE — 82710 FATS/LIPIDS FECES QUANT: CPT

## 2020-06-09 ENCOUNTER — HOSPITAL ENCOUNTER (EMERGENCY)
Facility: MEDICAL CENTER | Age: 50
End: 2020-06-09
Attending: EMERGENCY MEDICINE
Payer: COMMERCIAL

## 2020-06-09 ENCOUNTER — APPOINTMENT (OUTPATIENT)
Dept: RADIOLOGY | Facility: MEDICAL CENTER | Age: 50
End: 2020-06-09
Attending: EMERGENCY MEDICINE
Payer: COMMERCIAL

## 2020-06-09 VITALS
HEART RATE: 65 BPM | OXYGEN SATURATION: 95 % | RESPIRATION RATE: 17 BRPM | DIASTOLIC BLOOD PRESSURE: 89 MMHG | SYSTOLIC BLOOD PRESSURE: 125 MMHG | TEMPERATURE: 98 F | BODY MASS INDEX: 26.04 KG/M2 | HEIGHT: 71 IN | WEIGHT: 186 LBS

## 2020-06-09 DIAGNOSIS — R10.13 EPIGASTRIC PAIN: ICD-10-CM

## 2020-06-09 DIAGNOSIS — K86.89 PANCREATIC INSUFFICIENCY: ICD-10-CM

## 2020-06-09 LAB
ALBUMIN SERPL BCP-MCNC: 4.4 G/DL (ref 3.2–4.9)
ALBUMIN/GLOB SERPL: 1.8 G/DL
ALP SERPL-CCNC: 88 U/L (ref 30–99)
ALT SERPL-CCNC: 23 U/L (ref 2–50)
ANION GAP SERPL CALC-SCNC: 10 MMOL/L (ref 7–16)
APPEARANCE UR: CLEAR
AST SERPL-CCNC: 17 U/L (ref 12–45)
BASOPHILS # BLD AUTO: 0.2 % (ref 0–1.8)
BASOPHILS # BLD: 0.02 K/UL (ref 0–0.12)
BILIRUB SERPL-MCNC: 1.1 MG/DL (ref 0.1–1.5)
BILIRUB UR QL STRIP.AUTO: NEGATIVE
BUN SERPL-MCNC: 20 MG/DL (ref 8–22)
CALCIUM SERPL-MCNC: 9.3 MG/DL (ref 8.5–10.5)
CHLORIDE SERPL-SCNC: 101 MMOL/L (ref 96–112)
CO2 SERPL-SCNC: 24 MMOL/L (ref 20–33)
COLOR UR: YELLOW
CREAT SERPL-MCNC: 1.03 MG/DL (ref 0.5–1.4)
EKG IMPRESSION: NORMAL
EOSINOPHIL # BLD AUTO: 0.1 K/UL (ref 0–0.51)
EOSINOPHIL NFR BLD: 1.1 % (ref 0–6.9)
ERYTHROCYTE [DISTWIDTH] IN BLOOD BY AUTOMATED COUNT: 42.4 FL (ref 35.9–50)
GLOBULIN SER CALC-MCNC: 2.4 G/DL (ref 1.9–3.5)
GLUCOSE SERPL-MCNC: 125 MG/DL (ref 65–99)
GLUCOSE UR STRIP.AUTO-MCNC: NEGATIVE MG/DL
HCT VFR BLD AUTO: 44.7 % (ref 42–52)
HGB BLD-MCNC: 15.3 G/DL (ref 14–18)
IMM GRANULOCYTES # BLD AUTO: 0.03 K/UL (ref 0–0.11)
IMM GRANULOCYTES NFR BLD AUTO: 0.3 % (ref 0–0.9)
KETONES UR STRIP.AUTO-MCNC: NEGATIVE MG/DL
LEUKOCYTE ESTERASE UR QL STRIP.AUTO: NEGATIVE
LIPASE SERPL-CCNC: 34 U/L (ref 11–82)
LYMPHOCYTES # BLD AUTO: 1.67 K/UL (ref 1–4.8)
LYMPHOCYTES NFR BLD: 19.2 % (ref 22–41)
MCH RBC QN AUTO: 30.3 PG (ref 27–33)
MCHC RBC AUTO-ENTMCNC: 34.2 G/DL (ref 33.7–35.3)
MCV RBC AUTO: 88.5 FL (ref 81.4–97.8)
MICRO URNS: NORMAL
MONOCYTES # BLD AUTO: 0.75 K/UL (ref 0–0.85)
MONOCYTES NFR BLD AUTO: 8.6 % (ref 0–13.4)
NEUTROPHILS # BLD AUTO: 6.15 K/UL (ref 1.82–7.42)
NEUTROPHILS NFR BLD: 70.6 % (ref 44–72)
NITRITE UR QL STRIP.AUTO: NEGATIVE
NRBC # BLD AUTO: 0 K/UL
NRBC BLD-RTO: 0 /100 WBC
PH UR STRIP.AUTO: 5 [PH] (ref 5–8)
PLATELET # BLD AUTO: 196 K/UL (ref 164–446)
PMV BLD AUTO: 11.1 FL (ref 9–12.9)
POTASSIUM SERPL-SCNC: 3.5 MMOL/L (ref 3.6–5.5)
PROT SERPL-MCNC: 6.8 G/DL (ref 6–8.2)
PROT UR QL STRIP: NEGATIVE MG/DL
RBC # BLD AUTO: 5.05 M/UL (ref 4.7–6.1)
RBC UR QL AUTO: NEGATIVE
SODIUM SERPL-SCNC: 135 MMOL/L (ref 135–145)
SP GR UR STRIP.AUTO: 1.02
TROPONIN T SERPL-MCNC: 6 NG/L (ref 6–19)
UROBILINOGEN UR STRIP.AUTO-MCNC: 0.2 MG/DL
WBC # BLD AUTO: 8.7 K/UL (ref 4.8–10.8)

## 2020-06-09 PROCEDURE — A9270 NON-COVERED ITEM OR SERVICE: HCPCS | Performed by: EMERGENCY MEDICINE

## 2020-06-09 PROCEDURE — 700117 HCHG RX CONTRAST REV CODE 255: Performed by: EMERGENCY MEDICINE

## 2020-06-09 PROCEDURE — 85025 COMPLETE CBC W/AUTO DIFF WBC: CPT

## 2020-06-09 PROCEDURE — 74177 CT ABD & PELVIS W/CONTRAST: CPT

## 2020-06-09 PROCEDURE — 84484 ASSAY OF TROPONIN QUANT: CPT

## 2020-06-09 PROCEDURE — 93005 ELECTROCARDIOGRAM TRACING: CPT | Performed by: EMERGENCY MEDICINE

## 2020-06-09 PROCEDURE — 99284 EMERGENCY DEPT VISIT MOD MDM: CPT

## 2020-06-09 PROCEDURE — 700102 HCHG RX REV CODE 250 W/ 637 OVERRIDE(OP): Performed by: EMERGENCY MEDICINE

## 2020-06-09 PROCEDURE — 81003 URINALYSIS AUTO W/O SCOPE: CPT

## 2020-06-09 PROCEDURE — 80053 COMPREHEN METABOLIC PANEL: CPT

## 2020-06-09 PROCEDURE — 83690 ASSAY OF LIPASE: CPT

## 2020-06-09 PROCEDURE — 36415 COLL VENOUS BLD VENIPUNCTURE: CPT

## 2020-06-09 RX ADMIN — IOHEXOL 100 ML: 350 INJECTION, SOLUTION INTRAVENOUS at 05:30

## 2020-06-09 RX ADMIN — LIDOCAINE HYDROCHLORIDE 30 ML: 20 SOLUTION OROPHARYNGEAL at 04:27

## 2020-06-09 SDOH — HEALTH STABILITY: MENTAL HEALTH: HOW MANY STANDARD DRINKS CONTAINING ALCOHOL DO YOU HAVE ON A TYPICAL DAY?: 3 OR 4

## 2020-06-09 SDOH — HEALTH STABILITY: MENTAL HEALTH: HOW OFTEN DO YOU HAVE A DRINK CONTAINING ALCOHOL?: MONTHLY OR LESS

## 2020-06-09 ASSESSMENT — ENCOUNTER SYMPTOMS
SHORTNESS OF BREATH: 0
NAUSEA: 0
VOMITING: 0
ABDOMINAL PAIN: 1
HEADACHES: 0
CHILLS: 0
HEARTBURN: 1
FEVER: 0

## 2020-06-09 ASSESSMENT — FIBROSIS 4 INDEX: FIB4 SCORE: 0.77

## 2020-06-09 NOTE — ED TRIAGE NOTES
"Chief Complaint   Patient presents with   • Abdominal Pain     RUQ, LUQ. x 1 month. burning, comes and goes. loss of appetite. denies N/V       Pt amb to triage with steady gait for above complaint. Reports blood work showed elevated amylase. Was scheduled for CT this am but could not wait. Was awoken from sleep with pain.     Pt is alert and oriented, speaking in full sentences, follows commands and responds appropriately to questions. Resp are even and unlabored. No behavioral indicators of pain.   Pt placed in lobby. Pt educated on triage process. Pt encouraged to alert staff for any changes.    /97   Pulse 77   Temp 36.3 °C (97.4 °F) (Temporal)   Resp 17   Ht 1.803 m (5' 11\")   Wt 84.4 kg (186 lb)   SpO2 99%   BMI 25.94 kg/m²         "

## 2020-06-09 NOTE — ED NOTES
Patient complaining of LUQ abdominal pain that radiates up and to the right.  Has history of GERD, reports that it traveling up feels similar but not it radiates to the right which has him concerned.  Stools have been different softer and looser in consistency, unsure about color.  Has hx of fatty liver and cholecystectomy.  Denies pain currently

## 2020-06-09 NOTE — ED NOTES
Patient verbalizes understanding of follow up, pain management, and when to return to the ED.  All questions answered.

## 2020-06-09 NOTE — ED PROVIDER NOTES
"ED Provider Note    Scribed for Geovanna Laurent M.D. by Joaquim Street. 6/9/2020, 3:42 AM.    Primary care provider: Amy Ahmadi M.D.  Means of arrival: walk-in  History obtained from: patient  History limited by: none    CHIEF COMPLAINT  Chief Complaint   Patient presents with   • Abdominal Pain     RUQ, LUQ. x 1 month. burning, comes and goes. loss of appetite. denies N/V       HPI  Jamie Bosch Jr. is a 50 y.o. male who presents to the Emergency Department with complaints of intermittent upper abdominal pain that onset 1 month ago. He currently describes it as 1/10 pain. He initially thought it was GERD because there was a burning pain up his chest, but he then began having a band of pain across his upper abdomen. He is currently taking Pantoprazole. He had a CT scan scheduled for this morning at 8AM, but he presented today because he woke up from the pain and therefore came in for more immediate evaluation.  He states his bowel movements have been \"off\" recently, but he is unable to describe this further, he has not been constipated. He denies any nausea/vomiting. He has a history of a cholecystectomy 2 years ago and fatty liver disease.  He also has a history of appendectomy.  He also had a duodenal ulcer several years ago in 1997. He recently had an upper and lower endoscopy for his abdominal pain in March 2020 by GI which did not show any acute abnormalities. No history of HTN or DM.    PPE Note: I personally donned full PPE for all patient encounters during this visit, including being clean-shaven with an N95 respirator mask, gloves, and eye protection. Scribe remained outside the patient's room and did not have any contact with the patient for the duration of patient encounter.       REVIEW OF SYSTEMS  Review of Systems   Constitutional: Negative for chills and fever.   Respiratory: Negative for shortness of breath.    Cardiovascular: Negative for chest pain.   Gastrointestinal: Positive for abdominal " "pain and heartburn. Negative for nausea and vomiting.   Neurological: Negative for headaches.   All other systems reviewed and are negative.    PAST MEDICAL HISTORY   has a past medical history of Bowel habit changes, GERD (gastroesophageal reflux disease), Heart burn, High cholesterol, History of duodenal ulcer, and Indigestion.    SURGICAL HISTORY   has a past surgical history that includes lipoma excision (age 6); repair perf duod/nicholas ulc-wnd/inj (age 27); appendectomy laparoscopic (N/A, 2015); rj by laparoscopy (2016); umbilical hernia repair (2016); and vasectomy.    SOCIAL HISTORY  Social History     Tobacco Use   • Smoking status: Former Smoker     Packs/day: 0.20     Types: Cigarettes     Last attempt to quit: 1996     Years since quittin.4   • Smokeless tobacco: Never Used   Substance Use Topics   • Alcohol use: Yes     Alcohol/week: 0.0 oz     Frequency: Monthly or less     Drinks per session: 3 or 4     Comment: occ   • Drug use: No      Social History     Substance and Sexual Activity   Drug Use No       FAMILY HISTORY  Family History   Problem Relation Age of Onset   • Heart Disease Father    • GI Disease Maternal Grandmother         cirrhosis - non-alcoholic   • Diabetes Paternal Grandmother        CURRENT MEDICATIONS  Current Outpatient Medications   Medication Instructions   • famotidine (PEPCID) 20 mg, Oral, 2 TIMES DAILY   • magnesium chloride (MAG-64) 64 mg, Oral, DAILY   • pantoprazole (PROTONIX) 40 MG Tablet Delayed Response TAKE 1 TABLET BY MOUTH EVERY DAY   • rosuvastatin (CRESTOR) 40 MG tablet TAKE 1 TABLET BY MOUTH EVERY DAY       ALLERGIES  Allergies   Allergen Reactions   • Neomycin Swelling       PHYSICAL EXAM  VITAL SIGNS: /97   Pulse 80   Temp 36.3 °C (97.4 °F) (Temporal)   Resp 17   Ht 1.803 m (5' 11\")   Wt 84.4 kg (186 lb)   SpO2 98%   BMI 25.94 kg/m²   Vitals reviewed by myself.  Physical Exam  Nursing note and vitals reviewed.  Constitutional: " Well-developed and well-nourished. No acute distress.    HENT: Head is normocephalic and atraumatic.  Eyes: extra-ocular movements intact  Cardiovascular: Regular rate and regular rhythm. No murmur heard.  Pulmonary/Chest: Breath sounds normal. No wheezes or rales.   Abdominal: Soft and non-tender. No distention.  Negative Laurent sign, negative McBurney's point tenderness, no rebound or guarding  Musculoskeletal: Extremities exhibit normal range of motion without edema or tenderness.   Neurological: Awake and alert  Skin: Skin is warm and dry. No rash.      DIAGNOSTIC STUDIES /  LABS  Labs Reviewed   CBC WITH DIFFERENTIAL - Abnormal; Notable for the following components:       Result Value    Lymphocytes 19.20 (*)     All other components within normal limits   COMP METABOLIC PANEL - Abnormal; Notable for the following components:    Potassium 3.5 (*)     Glucose 125 (*)     All other components within normal limits   LIPASE   URINALYSIS,CULTURE IF INDICATED   TROPONIN   ESTIMATED GFR       All labs reviewed by me.    EKG Interpretation:  Interpreted by myself    12 Lead EKG interpreted by me to show:  EKG at 0350: Normal sinus rhythm, heart rate 70, normal axis, normal intervals, , QRS 92, QTc 419, no acute ST-T segment changes, no evidence of acute arrhythmia or ischemia  My impression of this EKG: Does not indicate ischemia or arrhythmia at this time.    RADIOLOGY  CT-ABDOMEN-PELVIS WITH   Final Result         1.  No acute abnormality.   2.  Diverticulosis   3.  Atherosclerosis        The radiologist's interpretation of all radiological studies have been reviewed by me.    REASSESSMENT  3:43 AM Patient presents to the ED with epigastric abdominal pain that has been ongoing for the past month. Patient will be treated with GI cocktail. Ordered for labs, imaging, and EKG to evaluate his symptoms.      COURSE & MEDICAL DECISION MAKING  Nursing notes, VS, PMSFHx reviewed in chart.    Patient is a 50-year-old  male who comes in for evaluation of abdominal pain.  Differential diagnosis includes gastritis, peptic ulcer disease, pancreatitis, electrolyte disturbance, gastroenteritis, colitis, diverticulitis.  Diagnostic work-up includes labs, urinalysis and CT of the abdomen.  Patient reports over the last month his pain has intermittently radiated up into his chest and therefore I will also obtain screening EKG and troponin for assessment of atypical presentation of acute coronary syndrome.    Patient's initial vitals are within normal limits.  He is well-appearing on exam, abdominal exam is benign.  EKG returns demonstrates no evidence of acute arrhythmia or ischemia.  Labs are unremarkable.  Lipase is within normal limits ruling out pancreatitis.  Troponin is within normal limits.  CT of the abdomen returns demonstrates no acute abnormalities.  Therefore patient is reassured and advised on continued follow-up with gastroenterology and PCP.  Patient is agreeable to this plan.  He is then given strict return precautions and discharged in stable condition.      FINAL IMPRESSION  1. Epigastric pain          Joaquim LOPEZ (Scribe), am scribing for, and in the presence of, Geovanna Laurent M.D..    Electronically signed by: Joaquim Street (Scribe), 6/9/2020    IGeovanna M.D. personally performed the services described in this documentation, as scribed by Joaquim Street in my presence, and it is both accurate and complete.    The note accurately reflects work and decisions made by me.  Geovanna Laurent M.D.  6/9/2020  6:02 AM

## 2020-06-12 LAB — FAT 24H STL-MRATE: 4.6 G/D (ref 0–6)

## 2020-06-13 LAB — ELASTASE PANC STL-MCNT: >500 UG/G

## 2020-06-18 LAB — TEST NAME 95000: NORMAL

## 2020-06-24 LAB — TEST NAME 95000: NORMAL

## 2020-07-11 DIAGNOSIS — K21.9 GASTROESOPHAGEAL REFLUX DISEASE, ESOPHAGITIS PRESENCE NOT SPECIFIED: ICD-10-CM

## 2020-07-13 DIAGNOSIS — E78.49 FAMILIAL HYPERLIPIDEMIA: ICD-10-CM

## 2020-07-14 RX ORDER — PANTOPRAZOLE SODIUM 40 MG/1
TABLET, DELAYED RELEASE ORAL
Qty: 90 TAB | Refills: 0 | Status: SHIPPED | OUTPATIENT
Start: 2020-07-14 | End: 2021-12-02 | Stop reason: SDUPTHER

## 2020-07-15 RX ORDER — ROSUVASTATIN CALCIUM 40 MG/1
TABLET, COATED ORAL
Qty: 90 TAB | Refills: 0 | Status: SHIPPED | OUTPATIENT
Start: 2020-07-15 | End: 2020-10-16

## 2020-10-15 DIAGNOSIS — E78.49 FAMILIAL HYPERLIPIDEMIA: ICD-10-CM

## 2020-10-16 RX ORDER — ROSUVASTATIN CALCIUM 40 MG/1
TABLET, COATED ORAL
Qty: 90 TAB | Refills: 2 | Status: SHIPPED | OUTPATIENT
Start: 2020-10-16 | End: 2021-12-02 | Stop reason: SDUPTHER

## 2020-10-16 NOTE — TELEPHONE ENCOUNTER
Lab Results   Component Value Date/Time    CHOLSTRLTOT 106 06/03/2020 08:26 AM    LDL 17 06/03/2020 08:26 AM    HDL 29 (A) 06/03/2020 08:26 AM    TRIGLYCERIDE 298 (H) 06/03/2020 08:26 AM       Lab Results   Component Value Date/Time    SODIUM 135 06/09/2020 03:30 AM    POTASSIUM 3.5 (L) 06/09/2020 03:30 AM    CHLORIDE 101 06/09/2020 03:30 AM    CO2 24 06/09/2020 03:30 AM    GLUCOSE 125 (H) 06/09/2020 03:30 AM    BUN 20 06/09/2020 03:30 AM    CREATININE 1.03 06/09/2020 03:30 AM     Lab Results   Component Value Date/Time    ALKPHOSPHAT 88 06/09/2020 03:30 AM    ASTSGOT 17 06/09/2020 03:30 AM    ALTSGPT 23 06/09/2020 03:30 AM    TBILIRUBIN 1.1 06/09/2020 03:30 AM    Last seen by PCP 06/11/2020. Will send 9 month(s) to the pharmacy.

## 2020-10-21 ENCOUNTER — OFFICE VISIT (OUTPATIENT)
Dept: MEDICAL GROUP | Facility: PHYSICIAN GROUP | Age: 50
End: 2020-10-21
Payer: COMMERCIAL

## 2020-10-21 VITALS
HEIGHT: 71 IN | BODY MASS INDEX: 28.03 KG/M2 | WEIGHT: 200.2 LBS | RESPIRATION RATE: 14 BRPM | OXYGEN SATURATION: 95 % | HEART RATE: 94 BPM | DIASTOLIC BLOOD PRESSURE: 80 MMHG | TEMPERATURE: 98.5 F | SYSTOLIC BLOOD PRESSURE: 128 MMHG

## 2020-10-21 DIAGNOSIS — Z23 NEED FOR VACCINATION: ICD-10-CM

## 2020-10-21 DIAGNOSIS — M79.18 MYOFASCIAL PAIN: ICD-10-CM

## 2020-10-21 PROBLEM — N43.40 SPERMATOCELE: Status: ACTIVE | Noted: 2019-05-09

## 2020-10-21 PROCEDURE — 90686 IIV4 VACC NO PRSV 0.5 ML IM: CPT | Performed by: FAMILY MEDICINE

## 2020-10-21 PROCEDURE — 90471 IMMUNIZATION ADMIN: CPT | Performed by: FAMILY MEDICINE

## 2020-10-21 PROCEDURE — 20553 NJX 1/MLT TRIGGER POINTS 3/>: CPT | Performed by: FAMILY MEDICINE

## 2020-10-21 SDOH — HEALTH STABILITY: MENTAL HEALTH: HOW MANY STANDARD DRINKS CONTAINING ALCOHOL DO YOU HAVE ON A TYPICAL DAY?: 1 OR 2

## 2020-10-21 ASSESSMENT — FIBROSIS 4 INDEX: FIB4 SCORE: 0.89

## 2020-10-22 NOTE — PROGRESS NOTES
Chief Complaint   Patient presents with   • Spasms   • Vitamin D Deficiency     er fv        HISTORY OF PRESENT ILLNESS: Patient is a 50 y.o. male established patient here today for the following concerns:    1. Need for vaccination  This is a pleasant 50 year old male here today for flu vaccine.  He is curious if there is a TB vaccine.  Has not had any contact with known infected persons or had any symptoms.      2. Myofascial pain  He does note that he has been under more physical and emotional stress.  He has been getting spasms in the trapezius and levator muscles that are described like shock like pain that twists his neck suddenly.  He denies any neck pain.  No radicular symptoms down the arm.  No weakness.  No fevers, chills or rashes noted.  He has tried self massage of the area with very temporary improvement.        Past Medical, Social, and Family history reviewed and updated in EPIC    Allergies:Neomycin    Current Outpatient Medications   Medication Sig Dispense Refill   • rosuvastatin (CRESTOR) 40 MG tablet TAKE 1 TABLET BY MOUTH EVERY DAY 90 Tab 2   • pantoprazole (PROTONIX) 40 MG Tablet Delayed Response TAKE 1 TABLET BY MOUTH EVERY DAY 90 Tab 0   • famotidine (PEPCID) 20 MG Tab Take 1 Tab by mouth 2 times a day. (Patient not taking: Reported on 6/3/2020) 60 Tab 0   • magnesium chloride (MAG-64) 64 MG Tablet Delayed Response Take 64 mg by mouth every day.       No current facility-administered medications for this visit.          ROS:  Review of Systems   Constitutional: Negative for fever, chills, weight loss and malaise/fatigue.   HENT: Negative for ear pain, nosebleeds, congestion, sore throat and neck pain.    Eyes: Negative for blurred vision.   Respiratory: Negative for cough, sputum production, shortness of breath and wheezing.    Cardiovascular: Negative for chest pain, palpitations,  and leg swelling.   Gastrointestinal: Negative for heartburn, nausea, vomiting, diarrhea and abdominal pain.  "  Genitourinary: Negative for dysuria, urgency and frequency.   Musculoskeletal: Negative for myalgias, back pain and joint pain.   Skin: Negative for rash and itching.   Neurological: Negative for dizziness, tingling, tremors, sensory change, focal weakness and headaches.   Endo/Heme/Allergies: Does not bruise/bleed easily.   Psychiatric/Behavioral: Negative for depression, anxiety, suicidal ideas, insomnia and memory loss.      Exam:  /80   Pulse 94   Temp 36.9 °C (98.5 °F)   Resp 14   Ht 1.803 m (5' 11\")   Wt 90.8 kg (200 lb 3.2 oz)   SpO2 95%     General:  Well nourished, well developed in NAD  Head is grossly normal.  Neck: Supple without JVD   Pulmonary:  Normal effort.   Cardiovascular: Regular rate  Extremities: no clubbing, cyanosis, or edema.  Psych: affect appropriate  MSK: + spasm noted in the left trap, left rhomboid/levator.       Procedure:  Trigger point injections -   Informed consent obtained.  Risks and benefits explained including bleeding, infection, and pneumothorax.   Areas were identified and marked in left levator scapulae, trapezius, and paraspinous muscle groups bilaterally for a total of 4 areas.  They were cleaned with alcohol swab and injected with a total of 10cc of 1% xylocaine without epinephrine.  Procedure was tolerated well and sites were dressed with a band-aid.        Please note that this dictation was created using voice recognition software. I have made every reasonable attempt to correct obvious errors, but I expect that there are errors of grammar and possibly content that I did not discover before finalizing the note.    Assessment/Plan:  1. Need for vaccination  - Influenza Vaccine Quad Injection (PF)    2. Myofascial pain  Trial of trigger point, recommend massage, Tens unit.  If not improving could consider PT> no indication for imaging.     Follow up prn.         "

## 2021-01-18 ENCOUNTER — HOSPITAL ENCOUNTER (OUTPATIENT)
Facility: MEDICAL CENTER | Age: 51
End: 2021-01-18
Attending: PHYSICIAN ASSISTANT
Payer: COMMERCIAL

## 2021-01-18 PROCEDURE — 83001 ASSAY OF GONADOTROPIN (FSH): CPT

## 2021-01-18 PROCEDURE — 84153 ASSAY OF PSA TOTAL: CPT

## 2021-01-18 PROCEDURE — 84403 ASSAY OF TOTAL TESTOSTERONE: CPT

## 2021-01-18 PROCEDURE — 83002 ASSAY OF GONADOTROPIN (LH): CPT

## 2021-01-18 PROCEDURE — 85025 COMPLETE CBC W/AUTO DIFF WBC: CPT

## 2021-01-19 LAB
BASOPHILS # BLD AUTO: 0.3 % (ref 0–1.8)
BASOPHILS # BLD: 0.02 K/UL (ref 0–0.12)
EOSINOPHIL # BLD AUTO: 0.11 K/UL (ref 0–0.51)
EOSINOPHIL NFR BLD: 1.6 % (ref 0–6.9)
ERYTHROCYTE [DISTWIDTH] IN BLOOD BY AUTOMATED COUNT: 41.9 FL (ref 35.9–50)
FSH SERPL-ACNC: 2.5 MIU/ML (ref 1.5–12.4)
HCT VFR BLD AUTO: 47 % (ref 42–52)
HGB BLD-MCNC: 16.1 G/DL (ref 14–18)
IMM GRANULOCYTES # BLD AUTO: 0.02 K/UL (ref 0–0.11)
IMM GRANULOCYTES NFR BLD AUTO: 0.3 % (ref 0–0.9)
LH SERPL-ACNC: 5.4 IU/L (ref 1.7–8.6)
LYMPHOCYTES # BLD AUTO: 1.35 K/UL (ref 1–4.8)
LYMPHOCYTES NFR BLD: 19.9 % (ref 22–41)
MCH RBC QN AUTO: 30 PG (ref 27–33)
MCHC RBC AUTO-ENTMCNC: 34.3 G/DL (ref 33.7–35.3)
MCV RBC AUTO: 87.7 FL (ref 81.4–97.8)
MONOCYTES # BLD AUTO: 0.65 K/UL (ref 0–0.85)
MONOCYTES NFR BLD AUTO: 9.6 % (ref 0–13.4)
NEUTROPHILS # BLD AUTO: 4.63 K/UL (ref 1.82–7.42)
NEUTROPHILS NFR BLD: 68.3 % (ref 44–72)
NRBC # BLD AUTO: 0 K/UL
NRBC BLD-RTO: 0 /100 WBC
PLATELET # BLD AUTO: 190 K/UL (ref 164–446)
PMV BLD AUTO: 12.6 FL (ref 9–12.9)
PSA SERPL-MCNC: 0.36 NG/ML (ref 0–4)
RBC # BLD AUTO: 5.36 M/UL (ref 4.7–6.1)
TESTOST SERPL-MCNC: 247 NG/DL (ref 175–781)
WBC # BLD AUTO: 6.8 K/UL (ref 4.8–10.8)

## 2021-02-09 ENCOUNTER — HOSPITAL ENCOUNTER (OUTPATIENT)
Dept: LAB | Facility: MEDICAL CENTER | Age: 51
End: 2021-02-09
Attending: INTERNAL MEDICINE
Payer: COMMERCIAL

## 2021-02-09 LAB
25(OH)D3 SERPL-MCNC: 29 NG/ML (ref 30–100)
FOLATE SERPL-MCNC: 27.4 NG/ML
MAGNESIUM SERPL-MCNC: 2.2 MG/DL (ref 1.5–2.5)
VIT B12 SERPL-MCNC: 691 PG/ML (ref 211–911)

## 2021-02-09 PROCEDURE — 83735 ASSAY OF MAGNESIUM: CPT

## 2021-02-09 PROCEDURE — 82306 VITAMIN D 25 HYDROXY: CPT

## 2021-02-09 PROCEDURE — 36415 COLL VENOUS BLD VENIPUNCTURE: CPT

## 2021-02-09 PROCEDURE — 82607 VITAMIN B-12: CPT

## 2021-02-09 PROCEDURE — 82746 ASSAY OF FOLIC ACID SERUM: CPT

## 2021-05-03 ENCOUNTER — HOSPITAL ENCOUNTER (OUTPATIENT)
Dept: LAB | Facility: MEDICAL CENTER | Age: 51
End: 2021-05-03
Attending: PHYSICIAN ASSISTANT
Payer: COMMERCIAL

## 2021-05-03 LAB
BASOPHILS # BLD AUTO: 0.2 % (ref 0–1.8)
BASOPHILS # BLD: 0.02 K/UL (ref 0–0.12)
EOSINOPHIL # BLD AUTO: 0.2 K/UL (ref 0–0.51)
EOSINOPHIL NFR BLD: 2.5 % (ref 0–6.9)
ERYTHROCYTE [DISTWIDTH] IN BLOOD BY AUTOMATED COUNT: 44.1 FL (ref 35.9–50)
HCT VFR BLD AUTO: 47.3 % (ref 42–52)
HGB BLD-MCNC: 16.1 G/DL (ref 14–18)
IMM GRANULOCYTES # BLD AUTO: 0.02 K/UL (ref 0–0.11)
IMM GRANULOCYTES NFR BLD AUTO: 0.2 % (ref 0–0.9)
LYMPHOCYTES # BLD AUTO: 2.05 K/UL (ref 1–4.8)
LYMPHOCYTES NFR BLD: 25.1 % (ref 22–41)
MCH RBC QN AUTO: 30 PG (ref 27–33)
MCHC RBC AUTO-ENTMCNC: 34 G/DL (ref 33.7–35.3)
MCV RBC AUTO: 88.1 FL (ref 81.4–97.8)
MONOCYTES # BLD AUTO: 0.7 K/UL (ref 0–0.85)
MONOCYTES NFR BLD AUTO: 8.6 % (ref 0–13.4)
NEUTROPHILS # BLD AUTO: 5.17 K/UL (ref 1.82–7.42)
NEUTROPHILS NFR BLD: 63.4 % (ref 44–72)
NRBC # BLD AUTO: 0 K/UL
NRBC BLD-RTO: 0 /100 WBC
PLATELET # BLD AUTO: 207 K/UL (ref 164–446)
PMV BLD AUTO: 12 FL (ref 9–12.9)
PSA SERPL-MCNC: 0.39 NG/ML (ref 0–4)
RBC # BLD AUTO: 5.37 M/UL (ref 4.7–6.1)
WBC # BLD AUTO: 8.2 K/UL (ref 4.8–10.8)

## 2021-05-03 PROCEDURE — 36415 COLL VENOUS BLD VENIPUNCTURE: CPT

## 2021-05-03 PROCEDURE — 84403 ASSAY OF TOTAL TESTOSTERONE: CPT

## 2021-05-03 PROCEDURE — 85025 COMPLETE CBC W/AUTO DIFF WBC: CPT

## 2021-05-03 PROCEDURE — 84153 ASSAY OF PSA TOTAL: CPT

## 2021-05-05 LAB — TESTOST SERPL-MCNC: 800 NG/DL (ref 300–890)

## 2021-12-02 PROBLEM — K21.9 GASTROESOPHAGEAL REFLUX DISEASE WITHOUT ESOPHAGITIS: Status: ACTIVE | Noted: 2021-12-02

## 2022-02-07 ENCOUNTER — HOSPITAL ENCOUNTER (OUTPATIENT)
Dept: LAB | Facility: MEDICAL CENTER | Age: 52
End: 2022-02-07
Attending: NURSE PRACTITIONER
Payer: COMMERCIAL

## 2022-02-07 DIAGNOSIS — E78.1 HYPERTRIGLYCERIDEMIA: ICD-10-CM

## 2022-02-07 DIAGNOSIS — Z13.1 ENCOUNTER FOR SCREENING FOR DIABETES MELLITUS: ICD-10-CM

## 2022-02-07 DIAGNOSIS — Z00.00 WELLNESS EXAMINATION: ICD-10-CM

## 2022-02-07 LAB
ALBUMIN SERPL BCP-MCNC: 4.7 G/DL (ref 3.2–4.9)
ALBUMIN/GLOB SERPL: 2 G/DL
ALP SERPL-CCNC: 72 U/L (ref 30–99)
ALT SERPL-CCNC: 35 U/L (ref 2–50)
ANION GAP SERPL CALC-SCNC: 11 MMOL/L (ref 7–16)
AST SERPL-CCNC: 22 U/L (ref 12–45)
BILIRUB SERPL-MCNC: 2 MG/DL (ref 0.1–1.5)
BUN SERPL-MCNC: 12 MG/DL (ref 8–22)
CALCIUM SERPL-MCNC: 9.5 MG/DL (ref 8.5–10.5)
CHLORIDE SERPL-SCNC: 106 MMOL/L (ref 96–112)
CHOLEST SERPL-MCNC: 102 MG/DL (ref 100–199)
CO2 SERPL-SCNC: 24 MMOL/L (ref 20–33)
CREAT SERPL-MCNC: 0.86 MG/DL (ref 0.5–1.4)
FASTING STATUS PATIENT QL REPORTED: NORMAL
GLOBULIN SER CALC-MCNC: 2.4 G/DL (ref 1.9–3.5)
GLUCOSE SERPL-MCNC: 93 MG/DL (ref 65–99)
HDLC SERPL-MCNC: 24 MG/DL
LDLC SERPL CALC-MCNC: ABNORMAL MG/DL
POTASSIUM SERPL-SCNC: 4.3 MMOL/L (ref 3.6–5.5)
PROT SERPL-MCNC: 7.1 G/DL (ref 6–8.2)
SODIUM SERPL-SCNC: 141 MMOL/L (ref 135–145)
TRIGL SERPL-MCNC: 405 MG/DL (ref 0–149)

## 2022-02-07 PROCEDURE — 36415 COLL VENOUS BLD VENIPUNCTURE: CPT

## 2022-02-07 PROCEDURE — 80053 COMPREHEN METABOLIC PANEL: CPT

## 2022-02-07 PROCEDURE — 80061 LIPID PANEL: CPT

## 2022-06-29 ENCOUNTER — HOSPITAL ENCOUNTER (OUTPATIENT)
Dept: LAB | Facility: MEDICAL CENTER | Age: 52
End: 2022-06-29
Attending: NURSE PRACTITIONER
Payer: COMMERCIAL

## 2022-06-29 ENCOUNTER — OFFICE VISIT (OUTPATIENT)
Dept: MEDICAL GROUP | Facility: PHYSICIAN GROUP | Age: 52
End: 2022-06-29
Payer: COMMERCIAL

## 2022-06-29 VITALS
RESPIRATION RATE: 18 BRPM | HEIGHT: 71 IN | WEIGHT: 192.2 LBS | SYSTOLIC BLOOD PRESSURE: 122 MMHG | HEART RATE: 83 BPM | OXYGEN SATURATION: 99 % | TEMPERATURE: 98.2 F | DIASTOLIC BLOOD PRESSURE: 74 MMHG | BODY MASS INDEX: 26.91 KG/M2

## 2022-06-29 DIAGNOSIS — R25.2 MUSCLE CRAMPS AT NIGHT: ICD-10-CM

## 2022-06-29 DIAGNOSIS — M62.838 MUSCLE SPASMS OF NECK: ICD-10-CM

## 2022-06-29 LAB
MAGNESIUM SERPL-MCNC: 2.3 MG/DL (ref 1.5–2.5)
POTASSIUM SERPL-SCNC: 4 MMOL/L (ref 3.6–5.5)

## 2022-06-29 PROCEDURE — 99214 OFFICE O/P EST MOD 30 MIN: CPT | Performed by: NURSE PRACTITIONER

## 2022-06-29 PROCEDURE — 83735 ASSAY OF MAGNESIUM: CPT

## 2022-06-29 PROCEDURE — 84132 ASSAY OF SERUM POTASSIUM: CPT

## 2022-06-29 PROCEDURE — 36415 COLL VENOUS BLD VENIPUNCTURE: CPT

## 2022-06-29 RX ORDER — CYCLOBENZAPRINE HCL 10 MG
10 TABLET ORAL 3 TIMES DAILY PRN
Qty: 90 TABLET | Refills: 0 | Status: SHIPPED | OUTPATIENT
Start: 2022-06-29 | End: 2022-12-07

## 2022-06-29 RX ORDER — DOXYCYCLINE HYCLATE 100 MG/1
CAPSULE ORAL
COMMUNITY
End: 2022-12-07

## 2022-06-29 RX ORDER — MELOXICAM 15 MG/1
TABLET ORAL
COMMUNITY
End: 2022-12-07

## 2022-06-29 RX ORDER — TAMSULOSIN HYDROCHLORIDE 0.4 MG/1
CAPSULE ORAL
COMMUNITY
End: 2022-12-07

## 2022-06-29 ASSESSMENT — PATIENT HEALTH QUESTIONNAIRE - PHQ9: CLINICAL INTERPRETATION OF PHQ2 SCORE: 0

## 2022-06-29 ASSESSMENT — FIBROSIS 4 INDEX: FIB4 SCORE: 0.92

## 2022-06-29 NOTE — ASSESSMENT & PLAN NOTE
Chronic and ongoing. He states that in the middle of the night he will get cramps to the bottom of his feet. He states that they will wake him up and it is difficult to get the cramps to go away. He states that if he flexes or extends his feet too much he will get cramping in his feet that much easier.

## 2022-06-29 NOTE — PROGRESS NOTES
"Subjective  Chief Complaint  Muscle Spasms    History of Present Illness  Jamie Bosch Jr. is a 52 y.o. male. This established patient is here today to discuss his muscle spasms.    Muscle spasms of neck  Chronic and ongoing. He states that years ago he started getting muscle spasms in his upper back neck area. He has received injections for it before. He states that he does get some tingling in his hands at times, almost all day long. He also notes some muscle weakness to his hands. Denies any numbness. He has not taking any NSAID's to help with the muscle spasms. He has not used heat or ice to help.    Muscle cramps at night  Chronic and ongoing. He states that in the middle of the night he will get cramps to the bottom of his feet. He states that they will wake him up and it is difficult to get the cramps to go away. He states that if he flexes or extends his feet too much he will get cramping in his feet that much easier.    Past Medical History    Allergies: Neomycin  Past Medical History:   Diagnosis Date   • Bowel habit changes     \"loose stools\" chronic    • Fatty liver disease, nonalcoholic 4/12/2016   • GERD (gastroesophageal reflux disease)    • Heart burn    • High cholesterol    • History of duodenal ulcer    • Indigestion    • Umbilical hernia without obstruction and without gangrene 6/25/2015     Past Surgical History:   Procedure Laterality Date   • CHARLIE BY LAPAROSCOPY  9/1/2016    Procedure: CHARLIE BY LAPAROSCOPY;  Surgeon: Mike Banda M.D.;  Location: [facility];  Service:    • UMBILICAL HERNIA REPAIR  9/1/2016    Procedure: UMBILICAL HERNIA REPAIR;  Surgeon: Mike Banda M.D.;  Location: SUR[facility]ERY [facility];  Service:    • APPENDECTOMY LAPAROSCOPIC N/A 7/23/2015    Procedure: APPENDECTOMY LAPAROSCOPIC;  Surgeon: Mike Banda M.D.;  Location: SURGERY [facility];  Service:    • LIPOMA EXCISION  age 6   • UT REPAIR PERF DUOD/MOLLY ULC-WND/INJ  age 27   • VASECTOMY  "      Current Outpatient Medications Ordered in Epic   Medication Sig Dispense Refill   • cyclobenzaprine (FLEXERIL) 10 mg Tab Take 1 Tablet by mouth 3 times a day as needed for Muscle Spasms. 90 Tablet 0   • pantoprazole (PROTONIX) 40 MG Tablet Delayed Response Take 1 Tablet by mouth every day. 90 Tablet 3   • rosuvastatin (CRESTOR) 40 MG tablet Take 1 Tablet by mouth every evening. 90 Tablet 3   • tamsulosin (FLOMAX) 0.4 MG capsule tamsulosin 0.4 mg capsule     • meloxicam (MOBIC) 15 MG tablet meloxicam 15 mg tablet     • doxycycline (VIBRAMYCIN) 100 MG Cap doxycycline hyclate 100 mg capsule   TAKE 1 CAPSULE TWICE A DAY BY MOUTH FOR 21 DAYS.       No current [facility]-ordered facility-administered medications on file.     Family History:    Family History   Problem Relation Age of Onset   • Heart Disease Father    • GI Disease Maternal Grandmother         cirrhosis - non-alcoholic   • Diabetes Paternal Grandmother       Personal/Social History:    Social History     Tobacco Use   • Smoking status: Former Smoker     Packs/day: 0.20     Types: Cigarettes     Quit date: 1996     Years since quittin.5   • Smokeless tobacco: Never Used   Vaping Use   • Vaping Use: Never used   Substance Use Topics   • Alcohol use: Yes     Alcohol/week: 0.0 oz     Comment: Occasionally    • Drug use: No     Social History     Social History Narrative   • Not on file      Review of Systems:   General: Negative for fever/chills and unexpected weight change.   Eyes:  Negative for vision changes, eye pain.  Respiratory:  Negative for cough and dyspnea.    Cardiovascular:  Negative for chest pain and palpitations.  Musculoskeletal: Positive for muscle spasms.  Skin:  Negative for rash.   Neurological:  Negative for numbness and headaches. Positive for tingling.    Objective  Physical Exam:   /74 (BP Location: Right arm, Patient Position: Sitting, BP Cuff Size: Adult)   Pulse 83   Temp 36.8 °C (98.2 °F) (Temporal)   Resp 18   Ht  "1.803 m (5' 11\")   Wt 87.2 kg (192 lb 3.2 oz)   SpO2 99%  Body mass index is 26.81 kg/m².  General:  Alert and oriented.  Well appearing.  NAD  Neck: Supple without JVD. No lymphadenopathy.  Pulmonary:  Normal effort.  Clear to ausculation without rales, ronchi, or wheezing.  Cardiovascular:  Regular rate and rhythm without murmur, rubs or gallop.   Skin:  Warm and dry.  No obvious lesions.  Musculoskeletal:  No extremity cyanosis, clubbing, or edema.        Assessment/Plan  1. Muscle spasms of neck  Chronic and ongoing.  Discussed getting an x-ray, he was agreeable.  He states that in the past he has gotten injections for a similar problem, discussed a referral to Sports Medicine which could possibly give him injections if they find it appropriate, he was agreeable.  Discussed Cyclobenzaprine risks, benefits and side effects, he verbalized understanding.  - DX-CERVICAL SPINE-2 OR 3 VIEWS; Future  - Referral to Sports Medicine  - cyclobenzaprine (FLEXERIL) 10 mg Tab; Take 1 Tablet by mouth 3 times a day as needed for Muscle Spasms.  Dispense: 90 Tablet; Refill: 0    2. Muscle cramps at night  Chronic and ongoing.  Discussed getting updated Potassium and Magnesium levels, he is agreeable.  - POTASSIUM SERUM (K); Future  - MAGNESIUM; Future      Health Maintenance: Completed    Return if symptoms worsen or fail to improve.    Please note that this dictation was created using voice recognition software. I have made every reasonable attempt to correct obvious errors, but I expect that there are errors of grammar and possibly content that I did not discover before finalizing the note.    MARA Andersen  [facility]  "

## 2022-06-29 NOTE — ASSESSMENT & PLAN NOTE
Chronic and ongoing. He states that years ago he started getting muscle spasms in his upper back neck area. He has received injections for it before. He states that he does get some tingling in his hands at times, almost all day long. He also notes some muscle weakness to his hands. Denies any numbness. He has not taking any NSAID's to help with the muscle spasms. He has not used heat or ice to help.

## 2022-07-11 ENCOUNTER — OFFICE VISIT (OUTPATIENT)
Dept: SPORTS MEDICINE | Facility: CLINIC | Age: 52
End: 2022-07-11
Payer: COMMERCIAL

## 2022-07-11 VITALS
RESPIRATION RATE: 16 BRPM | SYSTOLIC BLOOD PRESSURE: 118 MMHG | WEIGHT: 192.2 LBS | OXYGEN SATURATION: 98 % | HEART RATE: 78 BPM | DIASTOLIC BLOOD PRESSURE: 76 MMHG | BODY MASS INDEX: 26.91 KG/M2 | HEIGHT: 71 IN | TEMPERATURE: 98.2 F

## 2022-07-11 DIAGNOSIS — G25.89 SCAPULAR DYSKINESIS: ICD-10-CM

## 2022-07-11 PROCEDURE — 99213 OFFICE O/P EST LOW 20 MIN: CPT | Performed by: FAMILY MEDICINE

## 2022-07-11 ASSESSMENT — FIBROSIS 4 INDEX: FIB4 SCORE: 0.92

## 2022-07-11 NOTE — Clinical Note
Umair Truong, Thank you for referring Jamie  to our sports medicine clinic. I think he has either something going on at the cervical spine or an issue with his suprascapular nerve. We will have him try some formal physical therapy to see how he responds.  We will see him back after he has been doing therapy for about a month. Hope you are well! L

## 2022-07-11 NOTE — PROGRESS NOTES
"CHIEF COMPLAINT:  Jamie Bosch Jr. male presenting at the request of JUAN DIEGO Aguirre  for evaluation of LEFT c-spine and josselin-scapular pain.     Jamie Boshc Jr. is complaining of c-spine pain  1+ year, getting worse  Pain is at the superior, LEFT c-spine and superior head  Quality is  Muscular spasm resulting in twitching and even muscle jerking  Pain is Radiating with radicular symptoms to BOTH hands   Aggravated by driving for long periods, but symptoms can be spontaneous  Improved with  topical pain treatments (\"Japonese patch\")  Back handspring injury when he was younger resulting in severe injury at age 21, with a 1 month recovery back then  Prior Treatments: seen by PCP  Prior studies: X-Ray   Medications tried for pain include: topical patch helps minimally, cyclobenzaprine has HELPED  Mechanical Symptom history: No Locking     , field and desk work, 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 No pertinent past medical history.    PMH:  has a past medical history of Bowel habit changes, Fatty liver disease, nonalcoholic (4/12/2016), GERD (gastroesophageal reflux disease), Heart burn, High cholesterol, History of duodenal ulcer, Indigestion, and Umbilical hernia without obstruction and without gangrene (6/25/2015).    He has no past medical history of Cancer (HCC).  MEDS:   Current Outpatient Medications:   •  tamsulosin (FLOMAX) 0.4 MG capsule, tamsulosin 0.4 mg capsule, Disp: , Rfl:   •  meloxicam (MOBIC) 15 MG tablet, meloxicam 15 mg tablet, Disp: , Rfl:   •  doxycycline (VIBRAMYCIN) 100 MG Cap, doxycycline hyclate 100 mg capsule  TAKE 1 CAPSULE TWICE A DAY BY MOUTH FOR 21 DAYS., Disp: , Rfl:   •  cyclobenzaprine (FLEXERIL) 10 mg Tab, Take 1 Tablet by mouth 3 times a day as needed for Muscle " "Spasms., Disp: 90 Tablet, Rfl: 0  •  pantoprazole (PROTONIX) 40 MG Tablet Delayed Response, Take 1 Tablet by mouth every day., Disp: 90 Tablet, Rfl: 3  •  rosuvastatin (CRESTOR) 40 MG tablet, Take 1 Tablet by mouth every evening., Disp: 90 Tablet, Rfl: 3  ALLERGIES:   Allergies   Allergen Reactions   • Neomycin Swelling     SURGHX:   Past Surgical History:   Procedure Laterality Date   • CHARLIE BY LAPAROSCOPY  9/1/2016    Procedure: CHARLIE BY LAPAROSCOPY;  Surgeon: Mike Banda M.D.;  Location: SURGERY [facility];  Service:    • UMBILICAL HERNIA REPAIR  9/1/2016    Procedure: UMBILICAL HERNIA REPAIR;  Surgeon: Mike Banda M.D.;  Location: SURGERY [facility];  Service:    • APPENDECTOMY LAPAROSCOPIC N/A 7/23/2015    Procedure: APPENDECTOMY LAPAROSCOPIC;  Surgeon: Mike Banda M.D.;  Location: SURGERY [facility];  Service:    • LIPOMA EXCISION  age 6   • CA REPAIR PERF DUOD/MOLLY ULC-WND/INJ  age 27   • VASECTOMY       SOCHX:  reports that he has never smoked. He has never used smokeless tobacco. He reports current alcohol use. He reports that he does not use drugs.             FH: Family history was reviewed, no pertinent findings to report     PHYSICAL EXAM:  /76 (BP Location: Left arm, Patient Position: Sitting, BP Cuff Size: Adult)   Pulse 78   Temp 36.8 °C (98.2 °F) (Temporal)   Resp 16   Ht 1.803 m (5' 11\")   Wt 87.2 kg (192 lb 3.2 oz)   SpO2 98%   BMI 26.81 kg/m²      well-developed, well-nourished in no apparent distress, alert and oriented x 3.  Gait: normal    Cervical spine:  Range of motion Intact  Spurling's testing is NEGATIVE  Cervical spine tenderness NEGATIVE    Strength testing:     Deltoid, bilateral 5/5  Bicep, bilateral 5/5  Tricep, bilateral 5/5  Wrist Extension, bilateral 5/5  Wrist Flexion, bilateral 5/5  Finger Abduction, bilateral 5/5    Sensation:  INTACT Bilaterally        Reflexes:   Biceps: R 2+/L 2+  Triceps: R 2+/L  2+  Brachial radialis R 2+/L  " 2+  Sloan's testing is NEGATIVE  The arms are otherwise neurovascularly intact     Shoulder Exam:    RIGHT Shoulder:  No visible swelling   Range of motion INTACT  Tenderness: Non-tender  Empty Can Testing 5/5  Internal Rotation 5/5  External Rotation 5/5  Lift Off Testing 5/5  Impingement testing [place]  NEGATIVE  Neer's testing NEGATIVE  Apprehension testing NEGATIVE    LEFT Shoulder:  No visible swelling   Range of motion INTACT  There is asymmetry noted of the LEFT scapula with scapular protraction and 3 cm or greater distance from the thoracic spinous process compared to the right  Tenderness: POSITIVE tenderness at the medial border of the scapula  Empty Can Testing 5/5  Internal Rotation 5/5  External Rotation 5/5  Lift Off Testing 5/5  Impingement testing [place]  NEGATIVE  Neer's testing NEGATIVE  Apprehension testing NEGATIVE    Additional Findings: None    1. Scapular dyskinesis (LEFT scapular protraction)  Referral to Physical Therapy     1+ year, getting worse  Pain is at the superior, LEFT c-spine and superior head  Quality is  Muscular spasm resulting in twitching and even muscle jerking    Referral for PT [place]  Spine HEP program provided    Return in about 6 weeks (around 8/22/2022).   To see how he is doing approximately 1 month after he started PT        7/11/2022 1:09 PM     HISTORY/REASON FOR EXAM:  Upper back/neck muscle spasms.     TECHNIQUE/EXAM DESCRIPTION AND NUMBER OF VIEWS:  Cervical spine series, 4 views.     COMPARISON:  None.     FINDINGS:  Alignment of the cervical spine is straight.  Vertebral body heights are preserved.  Mild osteophyte formation at multiple levels.  Ossification of the anterior annulus at C3-4, C4-5 and C5-6.  Odontoid and lateral masses of C1 are intact.  Prevertebral soft tissues are within normal limits.  Cervicothoracic junction is intact.     IMPRESSION:     1.  Straightening and mild to moderate degenerative change of cervical spine.  2.  No fracture or  subluxation.             Exam Ended: 07/11/22  1:17 PM Last Resulted: 07/11/22  1:22 PM           Interpreted in the office today with the patient    Thank you KENIA Aguirre.P.R.DEVAN for allowing me to participate in caring for your patient.

## 2022-07-21 ENCOUNTER — OCCUPATIONAL MEDICINE (OUTPATIENT)
Dept: URGENT CARE | Facility: CLINIC | Age: 52
End: 2022-07-21
Payer: COMMERCIAL

## 2022-07-21 ENCOUNTER — APPOINTMENT (OUTPATIENT)
Dept: RADIOLOGY | Facility: IMAGING CENTER | Age: 52
End: 2022-07-21
Attending: PHYSICIAN ASSISTANT
Payer: COMMERCIAL

## 2022-07-21 VITALS
SYSTOLIC BLOOD PRESSURE: 112 MMHG | OXYGEN SATURATION: 98 % | BODY MASS INDEX: 26.6 KG/M2 | HEIGHT: 71 IN | RESPIRATION RATE: 14 BRPM | DIASTOLIC BLOOD PRESSURE: 78 MMHG | HEART RATE: 82 BPM | TEMPERATURE: 98.1 F | WEIGHT: 190 LBS

## 2022-07-21 DIAGNOSIS — S93.601A SPRAIN OF RIGHT FOOT, INITIAL ENCOUNTER: ICD-10-CM

## 2022-07-21 DIAGNOSIS — S93.491A SPRAIN OF ANTERIOR TALOFIBULAR LIGAMENT OF RIGHT ANKLE, INITIAL ENCOUNTER: ICD-10-CM

## 2022-07-21 DIAGNOSIS — S99.921A INJURY OF RIGHT FOOT, INITIAL ENCOUNTER: ICD-10-CM

## 2022-07-21 DIAGNOSIS — Z02.83 ENCOUNTER FOR DRUG SCREENING: ICD-10-CM

## 2022-07-21 LAB
BREATH ALCOHOL COMMENT: NORMAL
POC BREATHALIZER: 0 PERCENT (ref 0–0.01)

## 2022-07-21 PROCEDURE — 80305 DRUG TEST PRSMV DIR OPT OBS: CPT | Performed by: PHYSICIAN ASSISTANT

## 2022-07-21 PROCEDURE — 73630 X-RAY EXAM OF FOOT: CPT | Mod: TC,RT | Performed by: PHYSICIAN ASSISTANT

## 2022-07-21 PROCEDURE — 99213 OFFICE O/P EST LOW 20 MIN: CPT | Performed by: PHYSICIAN ASSISTANT

## 2022-07-21 PROCEDURE — 82075 ASSAY OF BREATH ETHANOL: CPT | Performed by: PHYSICIAN ASSISTANT

## 2022-07-21 ASSESSMENT — FIBROSIS 4 INDEX: FIB4 SCORE: 0.92

## 2022-07-21 ASSESSMENT — ENCOUNTER SYMPTOMS
PALPITATIONS: 0
FEVER: 0
SENSORY CHANGE: 0
CHILLS: 0
SHORTNESS OF BREATH: 0
VOMITING: 0
BLURRED VISION: 0
SORE THROAT: 0
NAUSEA: 0
TINGLING: 0

## 2022-07-21 NOTE — LETTER
"EMPLOYEE’S CLAIM FOR COMPENSATION/ REPORT OF INITIAL TREATMENT  FORM C-4    EMPLOYEE’S CLAIM - PROVIDE ALL INFORMATION REQUESTED   First Name  Jamie Last Name  Danitza Birthdate                    1970                Sex  male Claim Number (Insurer’s Use Only)    Home Address  [address] Age  52 y.o. Height  1.803 m (5' 11\") Weight  86.2 kg (190 lb) [facility]     [facility] Zip  [address] Telephone  [phone] (home)    Mailing Address  [address] [place] Zip  [address] Primary Language Spoken  English    Insurer  [facility] Third-Party   [employer] [facility]   Employee's Occupation (Job Title) When Injury or Occupational Disease Occurred      Employer's Name/Company Name  [employer]  Telephone  [phone]    Office Mail Address (Number and Street)   [address]  Zip  [address]    Date of Injury  7/21/2022               Hours Injury  9:15 AM Date Employer Notified  7/21/2022 Last Day of Work after Injury     or Occupational Disease  7/21/2022 Supervisor to Whom Injury     Reported  George Clifton   Address or Location of Accident (if applicable)  Work [1]   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 (Be specific an answer in detail. Use additional sheet if necessary)  MOMENTUM OF CART PUSHED ME DOWHILL, CAUSING RIGHT FOOT TO ROLL.   If you believe that you have an occupational disease, when did you first have knowledge of the disability and it relationship to your employment?  09:15 ON 7/21/22 Witnesses to the Accident  JAYNE PRINCE      Nature of Injury or Occupational Disease  Sprain  Part(s) of Body Injured or Affected  Foot (R), N/A, N/A    I certify that the above is true and correct to the best of my knowledge and that I have provided this information in " order to obtain the benefits of [place]  I hereby authorize any physician, chiropractor, surgeon, practitioner, or other person, any hospital, including [facility] or [facility] hospital, any medical service organization, any insurance company, or other institution or organization to release to each other, any medical or other information, including benefits paid or payable, pertinent to this injury or disease, except information relative to diagnosis, treatment and/or counseling for AIDS, psychological conditions, alcohol or controlled substances, for which I must give specific authorization.  A Photostat of this authorization shall be as valid as the original.     Date   Place Employee’s Original or  *Electronic Signature   THIS REPORT MUST BE COMPLETED AND MAILED WITHIN 3 WORKING DAYS OF TREATMENT   Place  [facility]  Name of Facility  [facility]   Date  7/21/2022 Diagnosis and Description of Injury or Occupational Disease  (S93.601A) Sprain of right foot, initial encounter  (S93.491A) Sprain of anterior talofibular ligament of right ankle, initial encounter Is there evidence the injured employee was under the influence of alcohol and/or another controlled substance at the time of accident?  ? No ? Yes (if yes, please explain)    Hour  10:55 AM   Diagnoses of Sprain of right foot, initial encounter and Sprain of anterior talofibular ligament of right ankle, initial encounter were pertinent to this visit. No   Treatment    -Tall walking boot and crutches provided.  He was advised to be mostly nonweightbearing for the next 48 hours and then he may be weightbearing as tolerated while wearing the boot.  - Apply ice multiple times per day  - OTC NSAIDs  - Keep elevated as much as possible  -Return on Monday for reevaluation  Have you advised the patient to remain off work five  "days or     more?    X-Ray Findings  Negative   ? Yes Indicate dates:   From   To      From information given by the employee, together with medical evidence, can        you directly connect this injury or occupational disease as job incurred?  Yes ? No If no, is the injured employee capable of:  ? full duty  No ? modified duty  Yes   Is additional medical care by a physician indicated?  Yes If Modified Duty, Specify any Limitations / Restrictions  See D39   Do you know of any previous injury or disease contributing to this condition or occupational disease?  ? Yes ? No (Explain if yes)                          No   Date  7/21/2022 Print Health Care Provider's   Roberta Garg P.A.-C. I certify the employer’s copy of  this form was mailed on:   Address  [address] Insurer’s Use Only     [place]  [id number]    Provider’s Tax ID Number  [id number] Telephone  Dept: [phone]             Health Care Provider’s Original or Electronic Signature  e-ROBERTA Tristan P.A.-C. Degree (MD,DO, DC,PACampbellC,APRN)   APRN      * Complete and attach Release of Information (Form C-4A) when injured employee signs C-4 Form electronically  ORIGINAL - TREATING HEALTHCARE PROVIDER PAGE 2 - INSURER/TPA PAGE 3 - EMPLOYER PAGE 4 - EMPLOYEE             Form C-4 (rev.08/21)           BRIEF DESCRIPTION OF RIGHTS AND BENEFITS  (Pursuant to [place] 616C.050)    Notice of Injury or Occupational Disease (Incident Report Form C-1): If an injury or occupational disease (OD) arises out of and in the course of employment, you must provide written notice to your employer as soon as practicable, but no later than 7 days after the accident or OD. Your employer shall maintain a sufficient supply of the required forms.    Claim for Compensation (Form C-4): If medical treatment is sought, the form C-4 is available at the place of initial treatment. A completed \"Claim for Compensation\" (Form C-4) must be filed within 90 " days after an accident or OD. The treating physician or chiropractor must, within 3 working days after treatment, complete and mail to the employer, the employer's insurer and third-party , the Claim for Compensation.    Medical Treatment: If you require medical treatment for your on-the-job injury or OD, you may be required to select a physician or chiropractor from a list provided by your workers’ compensation insurer, if it has contracted with an Organization for Managed Care (MCO) or Preferred Provider Organization (PPO) or providers of health care. If your employer has not entered into a contract with an MCO or PPO, you may select a physician or chiropractor from the Panel of Physicians and Chiropractors. Any medical costs related to your industrial injury or OD will be paid by your insurer.    Temporary Total Disability (TTD): If your doctor has certified that you are unable to work for a period of at least 5 consecutive days, or 5 cumulative days in a 20-day period, or places restrictions on you that your employer does not accommodate, you may be entitled to TTD compensation.    Temporary Partial Disability (TPD): If the wage you receive upon reemployment is less than the compensation for TTD to which you are entitled, the insurer may be required to pay you TPD compensation to make up the difference. TPD can only be paid for a maximum of 24 months.    Permanent Partial Disability (PPD): When your medical condition is stable and there is an indication of a PPD as a result of your injury or OD, within 30 days, your insurer must arrange for an evaluation by a rating physician or chiropractor to determine the degree of your PPD. The amount of your PPD award depends on the date of injury, the results of the PPD evaluation, your age and wage.    Permanent Total Disability (PTD): If you are medically certified by a treating physician or chiropractor as permanently and totally disabled and have been  granted a PTD status by your insurer, you are entitled to receive monthly benefits not to exceed 66 2/3% of your average monthly wage. The amount of your PTD payments is subject to reduction if you previously received a lump-sum PPD award.    Vocational Rehabilitation Services: You may be eligible for vocational rehabilitation services if you are unable to return to the job due to a permanent physical impairment or permanent restrictions as a result of your injury or occupational disease.    Transportation and Per Chantel Reimbursement: You may be eligible for travel expenses and per chantel associated with medical treatment.    Reopening: You may be able to reopen your claim if your condition worsens after claim closure.     Appeal Process: If you disagree with a written determination issued by the insurer or the insurer does not respond to your request, you may appeal to the Department of Administration, , by following the instructions contained in your determination letter. You must appeal the determination within 70 days from the date of the determination letter at [address], [address], [place], [place] [address], or [address], [address], [place] [address]. If you disagree with the  decision, you may appeal to the Department of Administration, . You must file your appeal within 30 days from the date of the  decision letter at [address], [address], [place], [place] [address], or [address], [place], [place] [address]. If you disagree with a decision of an , you may file a petition for judicial review with the District Court. You must do so within 30 days of the Appeal Officer’s decision. You may be represented by an  at your own expense or you may contact the [facility] for possible representation.    [place]  for Injured Workers ([place]IW): If you disagree with a   decision, you may request that [facility] represent you without charge at an  Hearing. For information regarding denial of benefits, you may contact the [facility] at: [address], [address], [place] [address], [phone], or [address], [address], [place] [address], [phone]    To File a Complaint with the Division: If you wish to file a complaint with the  of the Division of Industrial Relations (DIR),  please contact the Workers’ Compensation Section, [address], [address], [place], [place] [address], telephone [phone], or [address], [address], [place], [place] [address], telephone [phone].    For assistance with Workers’ Compensation Issues: You may contact the [facility] Office for Consumer Health Assistance, [address], [address], [place], [place] [address], Toll Free 1-414.601.3569, Web site: http://Community Health.[place].gov/Programs/LAKESHA E-mail: lakesha@[facility].nv.[facility]              __________________________________________________________________                                    _________________            Employee Name / Signature                                                                                                                            Date                                                                                                                                                                                                                              D-2 (rev. 10/20)

## 2022-07-21 NOTE — PROGRESS NOTES
Subjective     Jamie Bosch Jr. is a 52 y.o. male who presents with Foot Injury (WC DOI 7/21/22, R foot, pt states rolled over forward )      DOI: 7/21/2022      Jamie Bosch Jr. is a 52 y.o. male who presents today for evaluation of an injury to his right foot/ankle. Patient was wheeling a heavy cart of old hard drives down the driveway this morning.  He notes that there is a metal gate with a metal bar on the ground that he had to get the cart over.  As he lifted the car over the metal bar the driveway was on an incline in the car picked up momentum.  Patient was not expecting this and was easily trying to hold onto the cart to prevent it from going too fast but lost his footing a little bit.  He says that his right ankle seem to twist under him but mostly seem to go forward.  He has had quite a bit of pain on the top of his right foot since the incident.  No interventions have been done.  States that he has rolled his ankle multiple times in the past but those have all been inversion injuries.  This feels different.  No numbness or tingling.  He is able to ambulate but pain is much worse with ambulation.        Review of Systems   Constitutional: Negative for chills and fever.   HENT: Negative for sore throat.    Eyes: Negative for blurred vision.   Respiratory: Negative for shortness of breath.    Cardiovascular: Negative for chest pain and palpitations.   Gastrointestinal: Negative for nausea and vomiting.   Musculoskeletal: Positive for joint pain (Knee).   Neurological: Negative for tingling and sensory change.         PMH: No pertinent past medical history to this problem  MEDS: Medications were reviewed in Epic  ALLERGIES: Allergies were reviewed in Epic  SOCHX: Works as an  for the [employer]  FH: No pertinent family history to this problem          Objective     /78 (BP Location: Left arm, Patient Position: Sitting, BP Cuff Size: Adult)   Pulse 82   Temp 36.7 °C (98.1 °F) (Temporal)    "Resp 14   Ht 1.803 m (5' 11\")   Wt 86.2 kg (190 lb)   SpO2 98%   BMI 26.50 kg/m²      Physical Exam  Constitutional:       General: He is not in acute distress.     Appearance: He is not diaphoretic.   HENT:      Head: Normocephalic and atraumatic.      Right Ear: External ear normal.      Left Ear: External ear normal.   Eyes:      Conjunctiva/sclera: Conjunctivae normal.      Pupils: Pupils are equal, round, and reactive to light.   Pulmonary:      Effort: Pulmonary effort is normal. No respiratory distress.   Musculoskeletal:      Cervical back: Normal range of motion.      Right ankle: Swelling present. No deformity, ecchymosis or lacerations. Tenderness present over the ATF ligament. No lateral malleolus, medial malleolus, posterior TF ligament, base of 5th metatarsal or proximal fibula tenderness. Decreased range of motion. Anterior drawer test negative. Normal pulse.      Right Achilles Tendon: Normal.      Right foot: Decreased range of motion. Normal capillary refill. Swelling, tenderness and bony tenderness present. No crepitus. Normal pulse.        Legs:       Comments: Dorsal aspect of right foot more proximally/laterally exhibits diffuse tenderness palpation with soft tissue swelling noted.  There is no overlying erythema or ecchymosis.  No tenderness at the base of the fifth metatarsal.  No bony tenderness at the ankle.  ATFL is tender.  DP pulses are 2+ and symmetric bilaterally.  Distal neurovascular status intact.  Cap refill of all toes is less than 2 seconds.  Antalgic gait.   Skin:     Findings: No rash.   Neurological:      Mental Status: He is alert and oriented to person, place, and time.   Psychiatric:         Mood and Affect: Mood and affect normal.         Cognition and Memory: Memory normal.         Judgment: Judgment normal.         DX-FOOT-COMPLETE 3+ RIGHT  IMPRESSION:  No acute fracture or dislocation is noted.        *X-rays were reviewed and interpreted independently by me. I " agree with the radiologist's findings     Assessment & Plan     1. Sprain of right foot, initial encounter  - DX-FOOT-COMPLETE 3+ RIGHT; Future    2. Sprain of anterior talofibular ligament of right ankle, initial encounter    -Tall walking boot and crutches provided.  He was advised to be mostly nonweightbearing for the next 48 hours and then he may be weightbearing as tolerated while wearing the boot.  - Apply ice multiple times per day  - OTC NSAIDs  - Keep elevated as much as possible  -Return on Monday for reevaluation

## 2022-07-21 NOTE — LETTER
[facility]  [address] [place] [address]  Phone:  [phone] - Fax:  [phone]   [facility] Progress Report and Disability Certification  Date of Service: 7/21/2022   No Show:  No  Date / Time of Next Visit: 7/25/2022   Claim Information   Patient Name: Jamie Bosch Jr.  Claim Number:     Employer: [employer] Date of Injury: 7/21/2022     Insurer / TPA: [facility] ID / SSN:     Occupation:  Diagnosis: Diagnoses of Sprain of right foot, initial encounter and Sprain of anterior talofibular ligament of right ankle, initial encounter were pertinent to this visit.    Medical Information   Related to Industrial Injury? Yes   Subjective Complaints:  DOI: 7/21/2022      Jamie Bosch Jr. is a 52 y.o. male who presents today for evaluation of an injury to his right foot/ankle. Patient was wheeling a heavy cart of old hard drives down the driveway this morning.  He notes that there is a metal gate with a metal bar on the ground that he had to get the cart over.  As he lifted the car over the metal bar the driveway was on an incline in the car picked up momentum.  Patient was not expecting this and was easily trying to hold onto the cart to prevent it from going too fast but lost his footing a little bit.  He says that his right ankle seem to twist under him but mostly seem to go forward.  He has had quite a bit of pain on the top of his right foot since the incident.  No interventions have been done.  States that he has rolled his ankle multiple times in the past but those have all been inversion injuries.  This feels different.  No numbness or tingling.  He is able to ambulate but pain is much worse with ambulation.       Objective Findings:     Pre-Existing Condition(s):     Assessment:   Initial Visit    Status: Additional Care Required  Permanent Disability:No    Plan: Diagnostics    Diagnostics: X-ray    Comments:  X-ray negative for  any acute fracture or dislocation    Disability Information   Status: Released to Restricted Duty    From:  2022  Through: 2022 Restrictions are: Temporary   Physical Restrictions   Sitting:    Standing:  < or = to 1 hr/day Stoopin hrs/day Bending:      Squattin hrs/day Walkin hrs/day Climbin hrs/day Pushing:      Pulling:    Other:    Reaching Above Shoulder (L):   Reaching Above Shoulder (R):       Reaching Below Shoulder (L):    Reaching Below Shoulder (R):      Not to exceed Weight Limits   Carrying(hrs):   Weight Limit(lb):   Lifting(hrs):   Weight  Limit(lb):     Comments: Patient will need to wear the tall walking boot while at work and may need to utilize crutches for the next few days as well.  He should be doing more seated/sedentary activities until his follow-up visit.    Repetitive Actions   Hands: i.e. Fine Manipulations from Grasping:     Feet: i.e. Operating Foot Controls: 0 hrs/day   Driving / Operate Machinery:     Health Care Provider’s Original or Electronic Signature  Hailey Garg P.A.-C. Health Care Provider’s Original or Electronic Signature    Kristian Livingston MD         Clinic Name / Location: [address] [address] Clinic Phone Number: Dept: [phone]   Appointment Time: 10:15 Am Visit Start Time: 10:55 AM   Check-In Time:  10:41 Am Visit Discharge Time: 12:25 PM   Original-Treating Physician or Chiropractor    Page 2-Insurer/TPA    Page 3-Employer    Page 4-Employee

## 2022-12-07 ENCOUNTER — HOSPITAL ENCOUNTER (OUTPATIENT)
Dept: LAB | Facility: MEDICAL CENTER | Age: 52
End: 2022-12-07
Attending: NURSE PRACTITIONER
Payer: COMMERCIAL

## 2022-12-07 ENCOUNTER — OFFICE VISIT (OUTPATIENT)
Dept: MEDICAL GROUP | Facility: PHYSICIAN GROUP | Age: 52
End: 2022-12-07
Payer: COMMERCIAL

## 2022-12-07 VITALS
OXYGEN SATURATION: 98 % | HEIGHT: 71 IN | BODY MASS INDEX: 26.95 KG/M2 | HEART RATE: 112 BPM | RESPIRATION RATE: 16 BRPM | DIASTOLIC BLOOD PRESSURE: 76 MMHG | TEMPERATURE: 98.7 F | WEIGHT: 192.5 LBS | SYSTOLIC BLOOD PRESSURE: 116 MMHG

## 2022-12-07 DIAGNOSIS — Z23 NEED FOR VACCINATION: ICD-10-CM

## 2022-12-07 DIAGNOSIS — Z13.0 SCREENING FOR DEFICIENCY ANEMIA: ICD-10-CM

## 2022-12-07 DIAGNOSIS — Z13.1 ENCOUNTER FOR SCREENING FOR DIABETES MELLITUS: ICD-10-CM

## 2022-12-07 DIAGNOSIS — E78.1 HYPERTRIGLYCERIDEMIA: ICD-10-CM

## 2022-12-07 DIAGNOSIS — K21.9 GASTROESOPHAGEAL REFLUX DISEASE WITHOUT ESOPHAGITIS: ICD-10-CM

## 2022-12-07 DIAGNOSIS — Z00.00 WELLNESS EXAMINATION: ICD-10-CM

## 2022-12-07 PROBLEM — N50.89 TESTICULAR MASS: Status: RESOLVED | Noted: 2019-04-30 | Resolved: 2022-12-07

## 2022-12-07 LAB
ALBUMIN SERPL BCP-MCNC: 4.8 G/DL (ref 3.2–4.9)
ALBUMIN/GLOB SERPL: 1.8 G/DL
ALP SERPL-CCNC: 75 U/L (ref 30–99)
ALT SERPL-CCNC: 24 U/L (ref 2–50)
ANION GAP SERPL CALC-SCNC: 11 MMOL/L (ref 7–16)
AST SERPL-CCNC: 16 U/L (ref 12–45)
BILIRUB SERPL-MCNC: 2.6 MG/DL (ref 0.1–1.5)
BUN SERPL-MCNC: 13 MG/DL (ref 8–22)
CALCIUM SERPL-MCNC: 9.6 MG/DL (ref 8.5–10.5)
CHLORIDE SERPL-SCNC: 103 MMOL/L (ref 96–112)
CHOLEST SERPL-MCNC: 89 MG/DL (ref 100–199)
CO2 SERPL-SCNC: 25 MMOL/L (ref 20–33)
CREAT SERPL-MCNC: 0.78 MG/DL (ref 0.5–1.4)
ERYTHROCYTE [DISTWIDTH] IN BLOOD BY AUTOMATED COUNT: 42.8 FL (ref 35.9–50)
FASTING STATUS PATIENT QL REPORTED: NORMAL
GFR SERPLBLD CREATININE-BSD FMLA CKD-EPI: 108 ML/MIN/1.73 M 2
GLOBULIN SER CALC-MCNC: 2.6 G/DL (ref 1.9–3.5)
GLUCOSE SERPL-MCNC: 109 MG/DL (ref 65–99)
HCT VFR BLD AUTO: 46.9 % (ref 42–52)
HDLC SERPL-MCNC: 27 MG/DL
HGB BLD-MCNC: 15.9 G/DL (ref 14–18)
LDLC SERPL CALC-MCNC: 3 MG/DL
MCH RBC QN AUTO: 29.8 PG (ref 27–33)
MCHC RBC AUTO-ENTMCNC: 33.9 G/DL (ref 33.7–35.3)
MCV RBC AUTO: 88 FL (ref 81.4–97.8)
PLATELET # BLD AUTO: 194 K/UL (ref 164–446)
PMV BLD AUTO: 11.4 FL (ref 9–12.9)
POTASSIUM SERPL-SCNC: 4.5 MMOL/L (ref 3.6–5.5)
PROT SERPL-MCNC: 7.4 G/DL (ref 6–8.2)
RBC # BLD AUTO: 5.33 M/UL (ref 4.7–6.1)
SODIUM SERPL-SCNC: 139 MMOL/L (ref 135–145)
TRIGL SERPL-MCNC: 297 MG/DL (ref 0–149)
WBC # BLD AUTO: 13.5 K/UL (ref 4.8–10.8)

## 2022-12-07 PROCEDURE — 85027 COMPLETE CBC AUTOMATED: CPT

## 2022-12-07 PROCEDURE — 80061 LIPID PANEL: CPT

## 2022-12-07 PROCEDURE — 80053 COMPREHEN METABOLIC PANEL: CPT

## 2022-12-07 PROCEDURE — 90686 IIV4 VACC NO PRSV 0.5 ML IM: CPT | Performed by: NURSE PRACTITIONER

## 2022-12-07 PROCEDURE — 99396 PREV VISIT EST AGE 40-64: CPT | Mod: 25 | Performed by: NURSE PRACTITIONER

## 2022-12-07 PROCEDURE — 36415 COLL VENOUS BLD VENIPUNCTURE: CPT

## 2022-12-07 PROCEDURE — 90471 IMMUNIZATION ADMIN: CPT | Performed by: NURSE PRACTITIONER

## 2022-12-07 RX ORDER — ROSUVASTATIN CALCIUM 40 MG/1
40 TABLET, COATED ORAL EVERY EVENING
Qty: 90 TABLET | Refills: 3 | Status: SHIPPED | OUTPATIENT
Start: 2022-12-07 | End: 2024-02-14

## 2022-12-07 RX ORDER — PANTOPRAZOLE SODIUM 40 MG/1
40 TABLET, DELAYED RELEASE ORAL
Qty: 90 TABLET | Refills: 3 | Status: SHIPPED | OUTPATIENT
Start: 2022-12-07 | End: 2024-02-14 | Stop reason: SDUPTHER

## 2022-12-07 ASSESSMENT — FIBROSIS 4 INDEX: FIB4 SCORE: 0.92

## 2022-12-07 NOTE — PROGRESS NOTES
"Subjective  Chief Complaint  Annual Wellness Exam    History of Present Illness  Jamie Bosch Jr. is a 52 y.o. male who presents for an annual exam. He is feeling well and has no complaints.    Health Maintenance  Advanced directive: NA  PT/vit D for falls prevention: NA   Cholesterol Screening: Ordered   Diabetes Screening: Ordered   AAA Screening: NA   Aspirin Use: None    Diet: Eating healthy lately.  Exercise: Walks a lot at work, does cycle when he has time.   Substance Abuse: Denies   Safe in relationship.   Seat belts, bike helmet, gun safety discussed.  Sun protection used.    Cancer screening  Colorectal Cancer Screenin2020    Lung Cancer Screening: NA    Prostate Cancer Screening/PSA: 2021     Infectious disease screening/Immunizations  --STI Screening: Refused  --Practices safe sex.  --HIV Screening: Refused   --Hepatitis C Screenin2013  --Immunizations:   Influenza: Ordered, Discussed Neomycin allergy, he has never had a reaction to the Influenza shot and would still like to get it.   HPV:  NA    Tetanus: 10/2014    Shingles: Discussed with patient.   Pneumococcal : NA     Other immunizations: Up to date on all other immunizations.     Past Medical History    Allergies: Neomycin  Past Medical History:   Diagnosis Date    Bowel habit changes     \"loose stools\" chronic     Fatty liver disease, nonalcoholic 2016    GERD (gastroesophageal reflux disease)     Heart burn     High cholesterol     History of duodenal ulcer     Indigestion     Testicular mass 2019    Umbilical hernia without obstruction and without gangrene 2015     Past Surgical History:   Procedure Laterality Date    CHARLIE BY LAPAROSCOPY  2016    Procedure: CHARLIE BY LAPAROSCOPY;  Surgeon: Mike Banda M.D.;  Location: SURGERY [facility];  Service:     UMBILICAL HERNIA REPAIR  2016    Procedure: UMBILICAL HERNIA REPAIR;  Surgeon: Mike Banda M.D.;  Location: SURGERY [facility];  Service:     " APPENDECTOMY LAPAROSCOPIC N/A 7/23/2015    Procedure: APPENDECTOMY LAPAROSCOPIC;  Surgeon: Mike Banda M.D.;  Location: SURGERY [facility];  Service:     LIPOMA EXCISION  age 6    N[place] REPAIR PERF DUOD/MOLLY ULC-WND/INJ  age 27    VASECTOMY       Current Outpatient Medications Ordered in Epic   Medication Sig Dispense Refill    pantoprazole (PROTONIX) 40 MG Tablet Delayed Response Take 1 Tablet by mouth every day. 90 Tablet 3    rosuvastatin (CRESTOR) 40 MG tablet Take 1 Tablet by mouth every evening. 90 Tablet 3     No current Epic-ordered facility-administered medications on file.     Family History:    Family History   Problem Relation Age of Onset    Heart Disease Father     GI Disease Maternal Grandmother         cirrhosis - non-alcoholic    Diabetes Paternal Grandmother       Personal/Social History:    Social History     Tobacco Use    Smoking status: Never    Smokeless tobacco: Never   Vaping Use    Vaping Use: Never used   Substance Use Topics    Alcohol use: Yes     Alcohol/week: 0.0 oz     Comment: Occasionally     Drug use: No     Social History     Social History Narrative    Not on file     Review of Systems   General: Negative for fever/chills and expected weight change.  Eyes:  Negative for vision changes, eye pain.  ENT:  Negative for hearing changes, ear pain, congestion, sore throat, and neck pain.   Respiratory:  Negative for cough and dyspnea.    Cardiovascular:  Negative for chest pain and palpitations.  Gastrointestinal:  Negative for nausea/vomiting, changes in bowel habits, and abdominal pain.   Genitourinary:  Negative for dysuria and hematuria.   Musculoskeletal:  Negative for myalgias.   Skin:  Negative for rash.   Heme/Lymph:  Does not bruise/bleed easily.   Neurological:  Negative for numbness/tingling and headaches.   Psychiatric/Behavioral:  Negative for depression.  The patient is not nervous/anxious.      Objective  Physical Exam  /76 (BP Location: Left arm, Patient  "Position: Sitting, BP Cuff Size: Large adult)   Pulse (!) 112   Temp 37.1 °C (98.7 °F) (Temporal)   Resp 16   Ht 1.803 m (5' 11\")   Wt 87.3 kg (192 lb 8 oz)   SpO2 98%  Body mass index is 26.85 kg/m².  Wt Readings from Last 4 Encounters:   12/07/22 87.3 kg (192 lb 8 oz)   07/21/22 86.2 kg (190 lb)   07/11/22 87.2 kg (192 lb 3.2 oz)   06/29/22 87.2 kg (192 lb 3.2 oz)     Constitutional: Well-developed and well-nourished. Not diaphoretic. No distress.   Skin: Skin is warm and dry. No rash noted.  Head: Atraumatic without lesions.  Eyes: Conjunctivae and extraocular motions are normal. Pupils are equal, round, and reactive to light. No scleral icterus.   Ears:  External ears unremarkable. Tympanic membranes clear and intact.  Nose: Nares patent. Septum midline. Turbinates without erythema nor edema. No discharge.   Mouth/Throat: Dentition is normal. Tongue normal. Oropharynx is clear and moist. Posterior pharynx without erythema or exudates.  Neck: Supple, trachea midline. Normal range of motion. No thyromegaly present. No lymphadenopathy--cervical or supraclavicular.  Cardiovascular: Regular rate and rhythm, S1 and S2 without murmur, rubs, or gallops.  Lungs: Normal inspiratory effort, CTA bilaterally, no wheezes/rhonchi/rales  Abdomen: Soft, non tender, and without distention. Active bowel sounds in all four quadrants. No rebound, guarding, masses or HSM.  Extremities: No cyanosis, clubbing, erythema, nor edema. Distal pulses intact and symmetric.   Musculoskeletal: All major joints AROM full in all directions without pain.  Neurological: Grossly intact.  DTRs 2+/3 and symmetric.  No cranial nerve deficit.  Sensation intact.   Psychiatric:  Behavior, mood, and affect are appropriate.    A chaperone was offered to the patient during today's exam. Chaperone name: Geovanna Ang NP student was present.    Assessment/Plan  1. Wellness examination        2. Gastroesophageal reflux disease without esophagitis  " pantoprazole (PROTONIX) 40 MG Tablet Delayed Response      3. Hypertriglyceridemia  rosuvastatin (CRESTOR) 40 MG tablet    Lipid Profile      4. Screening for deficiency anemia  CBC WITHOUT DIFFERENTIAL      5. Encounter for screening for diabetes mellitus  Comp Metabolic Panel      6. Need for vaccination  Influenza Vaccine Quad Injection (PF)          Health Maintenance: Completed  Labs per orders.  Vaccinations per orders.  Counseling about diet, supplements, exercise, skin care and safe sex.    Follow-up: Return if symptoms worsen or fail to improve.    I have placed the above orders and discussed them with an approved delegating provider.  The MA is performing the below orders under the direction of Dr. Baljinder March MD/DO.     Please note that this dictation was created using voice recognition software. I have made every reasonable attempt to correct obvious errors, but I expect that there are errors of grammar and possibly content that I did not discover before finalizing the note.    MARA Andersen  [facility]

## 2022-12-10 ENCOUNTER — HOSPITAL ENCOUNTER (EMERGENCY)
Facility: MEDICAL CENTER | Age: 52
End: 2022-12-11
Attending: STUDENT IN AN ORGANIZED HEALTH CARE EDUCATION/TRAINING PROGRAM
Payer: COMMERCIAL

## 2022-12-10 ENCOUNTER — APPOINTMENT (OUTPATIENT)
Dept: RADIOLOGY | Facility: MEDICAL CENTER | Age: 52
End: 2022-12-10
Attending: STUDENT IN AN ORGANIZED HEALTH CARE EDUCATION/TRAINING PROGRAM
Payer: COMMERCIAL

## 2022-12-10 VITALS
WEIGHT: 190.48 LBS | HEART RATE: 78 BPM | TEMPERATURE: 97.9 F | DIASTOLIC BLOOD PRESSURE: 76 MMHG | HEIGHT: 71 IN | SYSTOLIC BLOOD PRESSURE: 128 MMHG | BODY MASS INDEX: 26.67 KG/M2 | OXYGEN SATURATION: 95 % | RESPIRATION RATE: 16 BRPM

## 2022-12-10 DIAGNOSIS — R10.13 EPIGASTRIC ABDOMINAL PAIN: ICD-10-CM

## 2022-12-10 LAB
ALBUMIN SERPL BCP-MCNC: 4.3 G/DL (ref 3.2–4.9)
ALBUMIN/GLOB SERPL: 1.3 G/DL
ALP SERPL-CCNC: 79 U/L (ref 30–99)
ALT SERPL-CCNC: 34 U/L (ref 2–50)
AMORPH CRY #/AREA URNS HPF: PRESENT /HPF
ANION GAP SERPL CALC-SCNC: 10 MMOL/L (ref 7–16)
APPEARANCE UR: CLEAR
AST SERPL-CCNC: 25 U/L (ref 12–45)
BACTERIA #/AREA URNS HPF: ABNORMAL /HPF
BASOPHILS # BLD AUTO: 0.2 % (ref 0–1.8)
BASOPHILS # BLD: 0.02 K/UL (ref 0–0.12)
BILIRUB CONJ SERPL-MCNC: 0.2 MG/DL (ref 0.1–0.5)
BILIRUB INDIRECT SERPL-MCNC: 1.6 MG/DL (ref 0–1)
BILIRUB SERPL-MCNC: 1.8 MG/DL (ref 0.1–1.5)
BILIRUB UR QL STRIP.AUTO: NEGATIVE
BUN SERPL-MCNC: 13 MG/DL (ref 8–22)
CALCIUM SERPL-MCNC: 9.1 MG/DL (ref 8.5–10.5)
CHLORIDE SERPL-SCNC: 97 MMOL/L (ref 96–112)
CO2 SERPL-SCNC: 27 MMOL/L (ref 20–33)
COLOR UR: YELLOW
CREAT SERPL-MCNC: 0.82 MG/DL (ref 0.5–1.4)
EOSINOPHIL # BLD AUTO: 0.2 K/UL (ref 0–0.51)
EOSINOPHIL NFR BLD: 2.2 % (ref 0–6.9)
EPI CELLS #/AREA URNS HPF: ABNORMAL /HPF
ERYTHROCYTE [DISTWIDTH] IN BLOOD BY AUTOMATED COUNT: 41.6 FL (ref 35.9–50)
GFR SERPLBLD CREATININE-BSD FMLA CKD-EPI: 106 ML/MIN/1.73 M 2
GLOBULIN SER CALC-MCNC: 3.2 G/DL (ref 1.9–3.5)
GLUCOSE SERPL-MCNC: 122 MG/DL (ref 65–99)
GLUCOSE UR STRIP.AUTO-MCNC: NEGATIVE MG/DL
HCT VFR BLD AUTO: 44 % (ref 42–52)
HGB BLD-MCNC: 15.1 G/DL (ref 14–18)
HYALINE CASTS #/AREA URNS LPF: ABNORMAL /LPF
IMM GRANULOCYTES # BLD AUTO: 0.04 K/UL (ref 0–0.11)
IMM GRANULOCYTES NFR BLD AUTO: 0.4 % (ref 0–0.9)
KETONES UR STRIP.AUTO-MCNC: NEGATIVE MG/DL
LEUKOCYTE ESTERASE UR QL STRIP.AUTO: NEGATIVE
LIPASE SERPL-CCNC: 34 U/L (ref 11–82)
LYMPHOCYTES # BLD AUTO: 1.13 K/UL (ref 1–4.8)
LYMPHOCYTES NFR BLD: 12.2 % (ref 22–41)
MCH RBC QN AUTO: 29.4 PG (ref 27–33)
MCHC RBC AUTO-ENTMCNC: 34.3 G/DL (ref 33.7–35.3)
MCV RBC AUTO: 85.8 FL (ref 81.4–97.8)
MICRO URNS: ABNORMAL
MONOCYTES # BLD AUTO: 1.11 K/UL (ref 0–0.85)
MONOCYTES NFR BLD AUTO: 11.9 % (ref 0–13.4)
NEUTROPHILS # BLD AUTO: 6.79 K/UL (ref 1.82–7.42)
NEUTROPHILS NFR BLD: 73.1 % (ref 44–72)
NITRITE UR QL STRIP.AUTO: NEGATIVE
NRBC # BLD AUTO: 0 K/UL
NRBC BLD-RTO: 0 /100 WBC
PH UR STRIP.AUTO: 6.5 [PH] (ref 5–8)
PLATELET # BLD AUTO: 216 K/UL (ref 164–446)
PMV BLD AUTO: 10.9 FL (ref 9–12.9)
POTASSIUM SERPL-SCNC: 3.9 MMOL/L (ref 3.6–5.5)
PROT SERPL-MCNC: 7.5 G/DL (ref 6–8.2)
PROT UR QL STRIP: NEGATIVE MG/DL
RBC # BLD AUTO: 5.13 M/UL (ref 4.7–6.1)
RBC # URNS HPF: ABNORMAL /HPF
RBC UR QL AUTO: ABNORMAL
SODIUM SERPL-SCNC: 134 MMOL/L (ref 135–145)
SP GR UR STRIP.AUTO: 1.01
UROBILINOGEN UR STRIP.AUTO-MCNC: 0.2 MG/DL
WBC # BLD AUTO: 9.3 K/UL (ref 4.8–10.8)
WBC #/AREA URNS HPF: ABNORMAL /HPF

## 2022-12-10 PROCEDURE — 36415 COLL VENOUS BLD VENIPUNCTURE: CPT

## 2022-12-10 PROCEDURE — 83690 ASSAY OF LIPASE: CPT

## 2022-12-10 PROCEDURE — 76705 ECHO EXAM OF ABDOMEN: CPT

## 2022-12-10 PROCEDURE — 81001 URINALYSIS AUTO W/SCOPE: CPT

## 2022-12-10 PROCEDURE — 99284 EMERGENCY DEPT VISIT MOD MDM: CPT

## 2022-12-10 PROCEDURE — 80053 COMPREHEN METABOLIC PANEL: CPT

## 2022-12-10 PROCEDURE — 82248 BILIRUBIN DIRECT: CPT

## 2022-12-10 PROCEDURE — 85025 COMPLETE CBC W/AUTO DIFF WBC: CPT

## 2022-12-10 RX ORDER — FAMOTIDINE 20 MG/1
20 TABLET, FILM COATED ORAL 2 TIMES DAILY
Qty: 60 TABLET | Refills: 0 | Status: SHIPPED | OUTPATIENT
Start: 2022-12-10

## 2022-12-10 ASSESSMENT — FIBROSIS 4 INDEX: FIB4 SCORE: 0.86

## 2022-12-11 NOTE — ED NOTES
PT AMBULATORY TO ROOM WITH A STEADY GAIT. PATIENT STATES ABDOMINAL PAIN X 3-4 DAYS, STATES HE HAS BEEN IN BED FEELING BAD AND WITH DIARRHEA. PATIENT STATES HIS PCP ALSO STATES HIS BILIRUBIN HAS BEEN ~2.6. PT IS AAOX4, BREATHING IS EVEN AND UNLABORED, SKIN IS WARM AND DRY, VSS.

## 2022-12-11 NOTE — ED TRIAGE NOTES
Jamie Bosch Jr.  52 y.o. male  Chief Complaint   Patient presents with    Abdominal Pain     2-3x days, generalized    Abnormal Labs     Pt states he had labs drawn as an outpatient and was told he has high bilirubin levels.        Vitals:    12/10/22 2025   BP: (!) 139/99   Pulse: 99   Resp: 18   Temp: 36.2 °C (97.2 °F)   SpO2: 98%       Patient educated on triage process and encouraged to alert staff of any changes in condition.    Pt ambulatory to triage for the above complaint.

## 2022-12-11 NOTE — ED PROVIDER NOTES
ED Provider Note    CHIEF COMPLAINT  Chief Complaint   Patient presents with    Abdominal Pain     2-3x days, generalized    Abnormal Labs     Pt states he had labs drawn as an outpatient and was told he has high bilirubin levels.        HPI  Jamie Bosch Jr. is a 52 y.o. male who presents with 2 to 3 days of generalized abdominal pain worse in the epigastrium.  Over the same period of Time he has also had cough, congestion, nausea but no vomiting.  He reports some loose stool.  Pain is intermittent, he has not noted anything that makes it specifically better or worse.  He states he has a history of an ulcer in the past but this feels different.  He denies fevers.  Has had his gallbladder out.  Separately he had a PCP appointment for general care 12/7 and had outpatient labs done which he states he looked up and saw a high bilirubin so he was concerned if this was related to his symptoms.  He has not been using any medication at home for his symptoms.    REVIEW OF SYSTEMS  See HPI for further details. All other systems are negative.     PAST MEDICAL HISTORY   has a past medical history of Bowel habit changes, Fatty liver disease, nonalcoholic (4/12/2016), GERD (gastroesophageal reflux disease), Heart burn, High cholesterol, History of duodenal ulcer, Indigestion, Testicular mass (4/30/2019), and Umbilical hernia without obstruction and without gangrene (6/25/2015).    SOCIAL HISTORY  Social History     Tobacco Use    Smoking status: Never    Smokeless tobacco: Never   Vaping Use    Vaping Use: Never used   Substance and Sexual Activity    Alcohol use: Yes     Alcohol/week: 0.0 oz     Comment: Occasionally     Drug use: No    Sexual activity: Yes     Partners: Female     Birth control/protection: None       SURGICAL HISTORY   has a past surgical history that includes lipoma excision (age 6); repair perf duod/nicholas ulc-wnd/inj (age 27); appendectomy laparoscopic (N/A, 7/23/2015); rj by laparoscopy (9/1/2016);  "umbilical hernia repair (9/1/2016); and vasectomy.    CURRENT MEDICATIONS  Home Medications    **Home medications have not yet been reviewed for this encounter**         ALLERGIES  Allergies   Allergen Reactions    Neomycin Swelling       PHYSICAL EXAM  VITAL SIGNS: /76   Pulse 78   Temp 36.6 °C (97.9 °F) (Temporal)   Resp 16   Ht 1.803 m (5' 11\")   Wt 86.4 kg (190 lb 7.6 oz)   SpO2 95%   BMI 26.57 kg/m²     Pulse ox interpretation: I interpret this pulse ox as normal.  Constitutional: Alert in no apparent distress.  HENT: No signs of trauma, Bilateral external ears normal, Nose normal.   Eyes: Pupils are equal and reactive, Conjunctiva normal, Non-icteric.   Neck: Normal range of motion, No tenderness, Supple, No stridor.   Cardiovascular: Regular rate and rhythm, no murmurs.   Thorax & Lungs: Normal breath sounds, No respiratory distress, No wheezing, No chest tenderness.   Abdomen: Soft, mild epigastric tenderness, No masses, No pulsatile masses. No peritoneal signs.  Skin: Warm, Dry, No erythema, No rash.   Extremities: Intact distal pulses, No edema, No tenderness, No cyanosis.  Musculoskeletal: Good range of motion in all major joints. No major deformities noted.   Neurologic: Alert , Normal motor function, Normal sensory function, No focal deficits noted.   Psychiatric: Affect normal, Judgment normal, Mood normal.       DIAGNOSTIC STUDIES / PROCEDURES    LABS  Results for orders placed or performed during the hospital encounter of 12/10/22   CBC WITH DIFFERENTIAL   Result Value Ref Range    WBC 9.3 4.8 - 10.8 K/uL    RBC 5.13 4.70 - 6.10 M/uL    Hemoglobin 15.1 14.0 - 18.0 g/dL    Hematocrit 44.0 42.0 - 52.0 %    MCV 85.8 81.4 - 97.8 fL    MCH 29.4 27.0 - 33.0 pg    MCHC 34.3 33.7 - 35.3 g/dL    RDW 41.6 35.9 - 50.0 fL    Platelet Count 216 164 - 446 K/uL    MPV 10.9 9.0 - 12.9 fL    Neutrophils-Polys 73.10 (H) 44.00 - 72.00 %    Lymphocytes 12.20 (L) 22.00 - 41.00 %    Monocytes 11.90 0.00 - " 13.40 %    Eosinophils 2.20 0.00 - 6.90 %    Basophils 0.20 0.00 - 1.80 %    Immature Granulocytes 0.40 0.00 - 0.90 %    Nucleated RBC 0.00 /100 WBC    Neutrophils (Absolute) 6.79 1.82 - 7.42 K/uL    Lymphs (Absolute) 1.13 1.00 - 4.80 K/uL    Monos (Absolute) 1.11 (H) 0.00 - 0.85 K/uL    Eos (Absolute) 0.20 0.00 - 0.51 K/uL    Baso (Absolute) 0.02 0.00 - 0.12 K/uL    Immature Granulocytes (abs) 0.04 0.00 - 0.11 K/uL    NRBC (Absolute) 0.00 K/uL   COMP METABOLIC PANEL   Result Value Ref Range    Sodium 134 (L) 135 - 145 mmol/L    Potassium 3.9 3.6 - 5.5 mmol/L    Chloride 97 96 - 112 mmol/L    Co2 27 20 - 33 mmol/L    Anion Gap 10.0 7.0 - 16.0    Glucose 122 (H) 65 - 99 mg/dL    Bun 13 8 - 22 mg/dL    Creatinine 0.82 0.50 - 1.40 mg/dL    Calcium 9.1 8.5 - 10.5 mg/dL    AST(SGOT) 25 12 - 45 U/L    ALT(SGPT) 34 2 - 50 U/L    Alkaline Phosphatase 79 30 - 99 U/L    Total Bilirubin 1.8 (H) 0.1 - 1.5 mg/dL    Albumin 4.3 3.2 - 4.9 g/dL    Total Protein 7.5 6.0 - 8.2 g/dL    Globulin 3.2 1.9 - 3.5 g/dL    A-G Ratio 1.3 g/dL   LIPASE   Result Value Ref Range    Lipase 34 11 - 82 U/L   URINALYSIS    Specimen: Urine   Result Value Ref Range    Color Yellow     Character Clear     Specific Gravity 1.015 <1.035    Ph 6.5 5.0 - 8.0    Glucose Negative Negative mg/dL    Ketones Negative Negative mg/dL    Protein Negative Negative mg/dL    Bilirubin Negative Negative    Urobilinogen, Urine 0.2 Negative    Nitrite Negative Negative    Leukocyte Esterase Negative Negative    Occult Blood Small (A) Negative    Micro Urine Req Microscopic    ESTIMATED GFR   Result Value Ref Range    GFR (CKD-EPI) 106 >60 mL/min/1.73 m 2   BILIRUBIN DIRECT   Result Value Ref Range    Direct Bilirubin 0.2 0.1 - 0.5 mg/dL   BILIRUBIN INDIRECT   Result Value Ref Range    Indirect Bilirubin 1.6 (H) 0.0 - 1.0 mg/dL   URINE MICROSCOPIC (W/UA)   Result Value Ref Range    WBC 0-2 (A) /hpf    RBC 2-5 (A) /hpf    Bacteria Few (A) None /hpf    Epithelial Cells Few  /hpf    Amorphous Crystal Present /hpf    Hyaline Cast 0-2 /lpf         RADIOLOGY  US-RUQ   Final Result      1. Echogenic liver, most commonly hepatic steatosis.      2. Mildly prominent intrahepatic bile ducts could relate to post cholecystectomy status. Correlate with LFTs.                 COURSE & MEDICAL DECISION MAKING  Pertinent Labs & Imaging studies reviewed. (See chart for details)    11:39 PM  Updated patient on results and plan for follow-up.      52-year-old male presenting for intermittent generalized abdominal discomfort for the last several days in the setting of flulike symptoms also concern for elevated bilirubin done at routine outpatient labs.  Patient is not jaundiced on exam and has only mild epigastric tenderness on my evaluation.  His bilirubin was only mildly elevated outpatient at 2.6 and his repeat here is actually decreased from this and is 1.8.  His LFTs are otherwise normal.  He has already had his gallbladder out but did perform ultrasound which showed only mild dilation of intrahepatic bile ducts which is likely related to his postcholecystectomy status especially given that the rest of his LFTs are normal and improved.  He has no significant tenderness in right upper quadrant suspect obstructing stone.  Suspect he is more likely to have a viral syndrome which is causing some mild abdominal discomfort.  Was started on Pepcid to help with acid if he has some mild gastritis.  Discharged with return precautions and instructed to follow-up with his primary care doctor for further work-up of his mildly elevated bilirubin.    The patient will not drink alcohol nor drive with prescribed medications. The patient will return for worsening symptoms and is stable at the time of discharge. The patient verbalizes understanding and will comply.    FINAL IMPRESSION  1. Epigastric abdominal pain  famotidine (PEPCID) 20 MG Tab            Electronically signed by: Jessika Perales M.D., 12/10/2022  9:26 PM

## 2022-12-11 NOTE — ED NOTES
PT DISCHARGE TO HOME. PT AMBULATORY WITH A STEADY GAIT. PT GIVEN D/C INSTRUCTIONS AND PT VERBALIZED AN UNDERSTANDING.

## 2022-12-11 NOTE — DISCHARGE INSTRUCTIONS
Take the following medications for pain/fever at home:  Acetaminophen (Tylenol): Take 650 mg (2 regular strength) every 6 hours. Do not take more than 3,000mg in a 24 hour period.   Ibuprofen: Take 400-600 mg (2-3 regular strength) every 6 hours. Take with food.   Alternate the two medications and you can take one of them every 3 hours.       At this time your labs are reassuring and the ultrasound and not find a clear cause of your pain or abnormal labs earlier today.  The labs are doing better, it is certainly possible that you have a mild virus that sometimes can cause brief elevation of labs.  Please follow-up with your primary care doctor for repeat of your labs and continued evaluation.    Return to the emergency department develops severe abdominal pain, or unable to tolerate oral fluids, your eyes turn yellow, you have difficulty breathing, severe chest pain, or other concerns.

## 2023-01-19 ENCOUNTER — HOSPITAL ENCOUNTER (OUTPATIENT)
Dept: LAB | Facility: MEDICAL CENTER | Age: 53
End: 2023-01-19
Attending: PHYSICIAN ASSISTANT
Payer: COMMERCIAL

## 2023-01-19 PROCEDURE — 82784 ASSAY IGA/IGD/IGG/IGM EACH: CPT

## 2023-01-19 PROCEDURE — 86364 TISS TRNSGLTMNASE EA IG CLAS: CPT

## 2023-01-19 PROCEDURE — 36415 COLL VENOUS BLD VENIPUNCTURE: CPT

## 2023-01-20 LAB
IGA SERPL-MCNC: 233 MG/DL (ref 68–408)
TTG IGA SER IA-ACNC: <2 U/ML (ref 0–3)

## 2023-01-31 ENCOUNTER — HOSPITAL ENCOUNTER (OUTPATIENT)
Facility: MEDICAL CENTER | Age: 53
End: 2023-01-31
Attending: PHYSICIAN ASSISTANT
Payer: COMMERCIAL

## 2023-01-31 PROCEDURE — 83993 ASSAY FOR CALPROTECTIN FECAL: CPT

## 2023-02-03 LAB — CALPROTECTIN STL-MCNT: 32 UG/G

## 2023-11-20 ENCOUNTER — HOSPITAL ENCOUNTER (OUTPATIENT)
Dept: LAB | Facility: MEDICAL CENTER | Age: 53
End: 2023-11-20
Attending: PHYSICIAN ASSISTANT
Payer: COMMERCIAL

## 2023-11-20 LAB
HCT VFR BLD AUTO: 49.7 % (ref 42–52)
HGB BLD-MCNC: 16.8 G/DL (ref 14–18)
PSA SERPL-MCNC: 0.47 NG/ML (ref 0–4)
TESTOST SERPL-MCNC: 608 NG/DL (ref 175–781)

## 2023-11-20 PROCEDURE — 85018 HEMOGLOBIN: CPT

## 2023-11-20 PROCEDURE — 84153 ASSAY OF PSA TOTAL: CPT

## 2023-11-20 PROCEDURE — 85014 HEMATOCRIT: CPT

## 2023-11-20 PROCEDURE — 36415 COLL VENOUS BLD VENIPUNCTURE: CPT

## 2023-11-20 PROCEDURE — 84403 ASSAY OF TOTAL TESTOSTERONE: CPT

## 2024-02-14 ENCOUNTER — OFFICE VISIT (OUTPATIENT)
Dept: MEDICAL GROUP | Facility: PHYSICIAN GROUP | Age: 54
End: 2024-02-14
Payer: COMMERCIAL

## 2024-02-14 ENCOUNTER — TELEPHONE (OUTPATIENT)
Dept: MEDICAL GROUP | Facility: PHYSICIAN GROUP | Age: 54
End: 2024-02-14

## 2024-02-14 VITALS
DIASTOLIC BLOOD PRESSURE: 88 MMHG | OXYGEN SATURATION: 99 % | HEART RATE: 85 BPM | BODY MASS INDEX: 28.29 KG/M2 | TEMPERATURE: 97.7 F | SYSTOLIC BLOOD PRESSURE: 122 MMHG | RESPIRATION RATE: 10 BRPM | WEIGHT: 202.1 LBS | HEIGHT: 71 IN

## 2024-02-14 DIAGNOSIS — Z23 NEED FOR VACCINATION: ICD-10-CM

## 2024-02-14 DIAGNOSIS — K21.9 GASTROESOPHAGEAL REFLUX DISEASE WITHOUT ESOPHAGITIS: ICD-10-CM

## 2024-02-14 DIAGNOSIS — E78.1 HYPERTRIGLYCERIDEMIA: ICD-10-CM

## 2024-02-14 PROCEDURE — 3079F DIAST BP 80-89 MM HG: CPT

## 2024-02-14 PROCEDURE — 3074F SYST BP LT 130 MM HG: CPT

## 2024-02-14 PROCEDURE — 99214 OFFICE O/P EST MOD 30 MIN: CPT | Mod: 25

## 2024-02-14 PROCEDURE — 90746 HEPB VACCINE 3 DOSE ADULT IM: CPT

## 2024-02-14 PROCEDURE — 90471 IMMUNIZATION ADMIN: CPT

## 2024-02-14 RX ORDER — ROSUVASTATIN CALCIUM 5 MG/1
5 TABLET, COATED ORAL EVERY EVENING
Qty: 90 TABLET | Refills: 3 | Status: SHIPPED | OUTPATIENT
Start: 2024-02-14

## 2024-02-14 RX ORDER — PANTOPRAZOLE SODIUM 40 MG/1
40 TABLET, DELAYED RELEASE ORAL
Qty: 90 TABLET | Refills: 3 | Status: SHIPPED | OUTPATIENT
Start: 2024-02-14

## 2024-02-14 RX ORDER — ICOSAPENT ETHYL 1000 MG/1
1 CAPSULE ORAL
Qty: 90 CAPSULE | Refills: 3 | Status: SHIPPED | OUTPATIENT
Start: 2024-02-14

## 2024-02-14 ASSESSMENT — FIBROSIS 4 INDEX: FIB4 SCORE: 1.03

## 2024-02-14 ASSESSMENT — PATIENT HEALTH QUESTIONNAIRE - PHQ9: CLINICAL INTERPRETATION OF PHQ2 SCORE: 0

## 2024-02-14 NOTE — ASSESSMENT & PLAN NOTE
Chronic, stable.   Patient reports that he has been on pantoprazole 40mg daily for many years in addition to famotidine 20mg BID. He admits that he loves spicy foods, does not drink alcohol. Tries to avoid eating before bed, but does drive a lot so sometimes he has to make fast meals around 7:00 pm. He reports that he has not had an EGD done recently, but does have history of gastric ulcer, cholecystectomy and appendix removed.     We discussed referral to GI for potential EGD to evaluate for presence of hiatal hernia, however would not  with PPI therapy.  Patient defers for now as he reports that his regimen of daily Protonix 40 mg in combination with Pepcid 20 mg twice daily is helpful

## 2024-02-14 NOTE — PROGRESS NOTES
CC:   Chief Complaint   Patient presents with    Medication Refill     Rosuvastatin  Pantoprazole         HPI: Patient is a 53 y.o. male presenting with the following issues:     Problem List Items Addressed This Visit       Hypertriglyceridemia     Chronic, not at goal.    Patient presents for follow-up regarding his hypertriglyceridemia.  He is currently on rosuvastatin 40 mg nightly.  He is due for updated labs.  He reports that he has been taking rosuvastatin for many years.  He has never been on any other medications.  Last labs show LDL is quite low at 3    Discussed reducing rosuvastatin to 5 mg nightly and adding in Icosapent ethyl 1g QHS.  Plan to recheck labs in 8 weeks with follow-up at that time.         Relevant Medications    Icosapent Ethyl 1 g Cap    rosuvastatin (CRESTOR) 5 MG Tab    Other Relevant Orders    Lipid Profile    Comp Metabolic Panel    Gastroesophageal reflux disease without esophagitis     Chronic, stable.   Patient reports that he has been on pantoprazole 40mg daily for many years in addition to famotidine 20mg BID. He admits that he loves spicy foods, does not drink alcohol. Tries to avoid eating before bed, but does drive a lot so sometimes he has to make fast meals around 7:00 pm. He reports that he has not had an EGD done recently, but does have history of gastric ulcer, cholecystectomy and appendix removed.     We discussed referral to GI for potential EGD to evaluate for presence of hiatal hernia, however would not  with PPI therapy.  Patient defers for now as he reports that his regimen of daily Protonix 40 mg in combination with Pepcid 20 mg twice daily is helpful         Relevant Medications    pantoprazole (PROTONIX) 40 MG Tablet Delayed Response     Other Visit Diagnoses       Need for vaccination        Relevant Orders    Hepatitis B Vaccine Adult 20+ (Completed)            Patient Active Problem List    Diagnosis Date Noted    Muscle spasms of neck  "06/29/2022    Muscle cramps at night 06/29/2022    Hypogonadism 01/17/2022    Gastroesophageal reflux disease without esophagitis 12/02/2021    Spermatocele 05/09/2019    Hypertriglyceridemia 04/12/2016       Current Outpatient Medications   Medication Sig Dispense Refill    Icosapent Ethyl 1 g Cap Take 1 Capsule by mouth at bedtime. 90 Capsule 3    rosuvastatin (CRESTOR) 5 MG Tab Take 1 Tablet by mouth every evening. 90 Tablet 3    pantoprazole (PROTONIX) 40 MG Tablet Delayed Response Take 1 Tablet by mouth every day. 90 Tablet 3    famotidine (PEPCID) 20 MG Tab Take 1 Tablet by mouth 2 times a day. 60 Tablet 0     No current facility-administered medications for this visit.       Allergies as of 02/14/2024 - Reviewed 02/14/2024   Allergen Reaction Noted    Neomycin Swelling 03/17/2007       Health Maintenance: Outstanding health maintenance items were ordered if applicable to patient including screening and preventative measures.     Review of Systems: Otherwise negative except for as stated above.      Objective/Assessment:    Exam: /88   Pulse 85   Temp 36.5 °C (97.7 °F) (Temporal)   Resp (!) 10   Ht 1.803 m (5' 11\")   Wt 91.7 kg (202 lb 1.6 oz)   SpO2 99%  Body mass index is 28.19 kg/m².    Physical Exam    Vital signs reviewed  Physical Exam  Constitutional:       Appearance: Normal appearance.   Eyes:      Extraocular Movements: Extraocular movements intact.   Pulmonary:      Effort: Pulmonary effort is normal.   Neurological:      General: No focal deficit present.      Mental Status: She is alert and oriented to person, place, and time.   Psychiatric:         Mood and Affect: Mood normal.         Behavior: Behavior normal.       Follow-up: Return in about 8 weeks (around 4/10/2024) for lab follow up.    Please note that this dictation was created using voice recognition software. I have made every reasonable attempt to correct obvious errors, but I expect that there are errors of grammar and " possibly content that I did not discover before finalizing the note.    Electronically signed by YUDITH Bernal on February 14, 2024

## 2024-02-14 NOTE — ASSESSMENT & PLAN NOTE
Chronic, not at goal.    Patient presents for follow-up regarding his hypertriglyceridemia.  He is currently on rosuvastatin 40 mg nightly.  He is due for updated labs.  He reports that he has been taking rosuvastatin for many years.  He has never been on any other medications.  Last labs show LDL is quite low at 3    Discussed reducing rosuvastatin to 5 mg nightly and adding in Icosapent ethyl 1g QHS.  Plan to recheck labs in 8 weeks with follow-up at that time.

## 2024-04-15 ENCOUNTER — APPOINTMENT (OUTPATIENT)
Dept: MEDICAL GROUP | Facility: PHYSICIAN GROUP | Age: 54
End: 2024-04-15
Payer: COMMERCIAL

## 2024-04-22 ENCOUNTER — HOSPITAL ENCOUNTER (OUTPATIENT)
Dept: LAB | Facility: MEDICAL CENTER | Age: 54
End: 2024-04-22
Payer: COMMERCIAL

## 2024-04-22 DIAGNOSIS — E78.1 HYPERTRIGLYCERIDEMIA: ICD-10-CM

## 2024-04-22 LAB
ALBUMIN SERPL BCP-MCNC: 4.6 G/DL (ref 3.2–4.9)
ALBUMIN/GLOB SERPL: 2 G/DL
ALP SERPL-CCNC: 72 U/L (ref 30–99)
ALT SERPL-CCNC: 30 U/L (ref 2–50)
ANION GAP SERPL CALC-SCNC: 13 MMOL/L (ref 7–16)
AST SERPL-CCNC: 22 U/L (ref 12–45)
BILIRUB SERPL-MCNC: 1.2 MG/DL (ref 0.1–1.5)
BUN SERPL-MCNC: 15 MG/DL (ref 8–22)
CALCIUM ALBUM COR SERPL-MCNC: 8.7 MG/DL (ref 8.5–10.5)
CALCIUM SERPL-MCNC: 9.2 MG/DL (ref 8.5–10.5)
CHLORIDE SERPL-SCNC: 102 MMOL/L (ref 96–112)
CHOLEST SERPL-MCNC: 131 MG/DL (ref 100–199)
CO2 SERPL-SCNC: 24 MMOL/L (ref 20–33)
CREAT SERPL-MCNC: 0.7 MG/DL (ref 0.5–1.4)
FASTING STATUS PATIENT QL REPORTED: NORMAL
GFR SERPLBLD CREATININE-BSD FMLA CKD-EPI: 110 ML/MIN/1.73 M 2
GLOBULIN SER CALC-MCNC: 2.3 G/DL (ref 1.9–3.5)
GLUCOSE SERPL-MCNC: 104 MG/DL (ref 65–99)
HDLC SERPL-MCNC: 24 MG/DL
LDLC SERPL CALC-MCNC: ABNORMAL MG/DL
POTASSIUM SERPL-SCNC: 4.6 MMOL/L (ref 3.6–5.5)
PROT SERPL-MCNC: 6.9 G/DL (ref 6–8.2)
SODIUM SERPL-SCNC: 139 MMOL/L (ref 135–145)
TRIGL SERPL-MCNC: 460 MG/DL (ref 0–149)

## 2024-04-22 PROCEDURE — 36415 COLL VENOUS BLD VENIPUNCTURE: CPT

## 2024-04-22 PROCEDURE — 80061 LIPID PANEL: CPT

## 2024-04-22 PROCEDURE — 80053 COMPREHEN METABOLIC PANEL: CPT

## 2024-06-06 ENCOUNTER — HOSPITAL ENCOUNTER (OUTPATIENT)
Dept: LAB | Facility: MEDICAL CENTER | Age: 54
End: 2024-06-06
Attending: PHYSICIAN ASSISTANT
Payer: COMMERCIAL

## 2024-06-06 ENCOUNTER — APPOINTMENT (OUTPATIENT)
Dept: MEDICAL GROUP | Facility: PHYSICIAN GROUP | Age: 54
End: 2024-06-06
Payer: COMMERCIAL

## 2024-06-06 VITALS
SYSTOLIC BLOOD PRESSURE: 126 MMHG | HEIGHT: 71 IN | WEIGHT: 192.1 LBS | DIASTOLIC BLOOD PRESSURE: 96 MMHG | BODY MASS INDEX: 26.9 KG/M2 | TEMPERATURE: 97.5 F | OXYGEN SATURATION: 98 % | RESPIRATION RATE: 14 BRPM | HEART RATE: 92 BPM

## 2024-06-06 DIAGNOSIS — E78.1 HYPERTRIGLYCERIDEMIA: ICD-10-CM

## 2024-06-06 DIAGNOSIS — R09.82 PND (POST-NASAL DRIP): ICD-10-CM

## 2024-06-06 DIAGNOSIS — M62.838 MUSCLE SPASMS OF NECK: ICD-10-CM

## 2024-06-06 DIAGNOSIS — R03.0 ELEVATED BP WITHOUT DIAGNOSIS OF HYPERTENSION: ICD-10-CM

## 2024-06-06 LAB
HCT VFR BLD AUTO: 46.4 % (ref 42–52)
HGB BLD-MCNC: 16 G/DL (ref 14–18)

## 2024-06-06 PROCEDURE — 84153 ASSAY OF PSA TOTAL: CPT

## 2024-06-06 PROCEDURE — 3080F DIAST BP >= 90 MM HG: CPT

## 2024-06-06 PROCEDURE — 99214 OFFICE O/P EST MOD 30 MIN: CPT

## 2024-06-06 PROCEDURE — 85014 HEMATOCRIT: CPT

## 2024-06-06 PROCEDURE — 3074F SYST BP LT 130 MM HG: CPT

## 2024-06-06 PROCEDURE — 84403 ASSAY OF TOTAL TESTOSTERONE: CPT

## 2024-06-06 PROCEDURE — 85018 HEMOGLOBIN: CPT

## 2024-06-06 PROCEDURE — 36415 COLL VENOUS BLD VENIPUNCTURE: CPT

## 2024-06-06 RX ORDER — CYCLOBENZAPRINE HCL 10 MG
10 TABLET ORAL 3 TIMES DAILY PRN
Qty: 30 TABLET | Refills: 0 | Status: SHIPPED | OUTPATIENT
Start: 2024-06-06

## 2024-06-06 RX ORDER — ICOSAPENT ETHYL 1 G/1
1 CAPSULE ORAL 2 TIMES DAILY WITH MEALS
Qty: 180 CAPSULE | Refills: 3 | Status: SHIPPED | OUTPATIENT
Start: 2024-06-06

## 2024-06-06 ASSESSMENT — FIBROSIS 4 INDEX: FIB4 SCORE: 0.99

## 2024-06-06 NOTE — PROGRESS NOTES
Verbal consent was acquired by the patient to use 5 CUPS and some sugar ambient listening note generation during this visit     Subjective:     HPI:   History of Present Illness  The patient is a 54-year-old male presenting today to formally establish care. He has a history of hypertriglyceridemia for which he is on icosapent ethyl 1 g, 1 capsule every bedtime as well as GERD for which he is taking pantoprazole 40 mg daily. Additionally, for his hypertriglyceridemia, he is also taking rosuvastatin 5 mg every evening. His last labs were most recently done in 04/2024, which indicate that his triglycerides are not at goal and still elevated at 460.    The patient reports experiencing nocturnal muscle cramps, which he has been experiencing for several years. He has been on rosuvastatin since 2020 and expresses concern about potential liver damage. He engages in 20 push-ups nightly. He has been attempting to incorporate more salads into his diet.    The patient has been experiencing throat discomfort for the past month, which he describes as a constant tickling sensation. He is not currently on any allergy medication.    The patient reports snoring when sleeping on his side, but not when sleeping in a face-up position. He experiences daytime fatigue and is uncertain if he has sleep apnea. He does not own a home blood pressure monitor.    The patient reports pain in his left arm, but denies any numbness or tingling. He has previously sought treatment from Dr. Ahmadi for this condition, who administered lidocaine injections. He has undergone physical therapy and continues to perform exercises at home. Massage therapy has not provided relief. He frequently picks up paper boxes, which he believes exacerbates his symptoms. He has not taken any muscle relaxants.        Assessment & Plan:     1. Hypertriglyceridemia  Icosapent Ethyl 1 g Cap    Lipid Profile      2. Muscle spasms of neck  cyclobenzaprine (FLEXERIL) 10 mg Tab    Referral to  Sports Medicine      3. PND (post-nasal drip)            Assessment & Plan  1. Hypertriglyceridemia.  This is a chronic, uncontrolled medical condition   The dosage of Omega will be increased to 1 g to be taken twice daily. A follow-up lab recheck is scheduled for 3 months from now. Should the lab results remain suboptimal, discontinuation of the statin will be considered, followed by a focus on triglycerides.    2. Postnasal drip.  Acute problem.   The patient is advised to commence a daily regimen of Claritin or Allegra for the ensuing month.    3. Left arm pain.  This is a chronic, uncontrolled medical condition   A prescription for Flexeril, to be taken up to thrice daily at bedtime, will be provided. A referral to Dr. Ramirez, a sports medicine specialist, will be made.    4. Elevated blood pressure.  New finding. Rebecca score  6 (6/6/2024  8:19 AM)  Wishes to check BP at home and treat PND prior to sleep study   The patient's diastolic blood pressure was elevated during today's visit. The patient is advised to purchase a home blood pressure monitor and periodically monitor blood pressure throughout the week at rest.    Health Maintenance: Completed    Objective:     Exam:  Objective:  Vitals:    06/06/24 0756   BP: (!) 126/96   Pulse: 92   Resp: 14   Temp: 36.4 °C (97.5 °F)   SpO2: 98%     Physical Exam  Physical Exam    Constitutional:       Appearance: Normal appearance.   Eyes:      Extraocular Movements: Extraocular movements intact.   Pulmonary:      Effort: Pulmonary effort is normal.   Neurological:      General: No focal deficit present.      Mental Status: She is alert and oriented to person, place, and time.   Psychiatric:         Mood and Affect: Mood normal.         Behavior: Behavior normal.       Results  Laboratory Studies  Triglycerides are elevated at 460.    Return in about 3 months (around 9/6/2024) for lab follow up.    Please note that this dictation was created using voice recognition  software. I have made every reasonable attempt to correct obvious errors, but I expect that there are errors of grammar and possibly content that I did not discover before finalizing the note.

## 2024-06-07 LAB
PSA SERPL-MCNC: 0.58 NG/ML (ref 0–4)
TESTOST SERPL-MCNC: 304 NG/DL (ref 175–781)

## 2024-06-17 ENCOUNTER — OFFICE VISIT (OUTPATIENT)
Dept: SPORTS MEDICINE | Facility: OTHER | Age: 54
End: 2024-06-17
Payer: COMMERCIAL

## 2024-06-17 ENCOUNTER — APPOINTMENT (OUTPATIENT)
Dept: MEDICAL GROUP | Facility: PHYSICIAN GROUP | Age: 54
End: 2024-06-17
Payer: COMMERCIAL

## 2024-06-17 VITALS
HEART RATE: 91 BPM | TEMPERATURE: 97.1 F | DIASTOLIC BLOOD PRESSURE: 90 MMHG | BODY MASS INDEX: 27.86 KG/M2 | OXYGEN SATURATION: 95 % | WEIGHT: 199 LBS | SYSTOLIC BLOOD PRESSURE: 126 MMHG | HEIGHT: 71 IN

## 2024-06-17 DIAGNOSIS — G25.89 SCAPULAR DYSKINESIS: ICD-10-CM

## 2024-06-17 PROCEDURE — 99213 OFFICE O/P EST LOW 20 MIN: CPT | Performed by: FAMILY MEDICINE

## 2024-06-17 PROCEDURE — 3074F SYST BP LT 130 MM HG: CPT | Performed by: FAMILY MEDICINE

## 2024-06-17 PROCEDURE — 3080F DIAST BP >= 90 MM HG: CPT | Performed by: FAMILY MEDICINE

## 2024-06-17 ASSESSMENT — FIBROSIS 4 INDEX: FIB4 SCORE: 0.99

## 2024-07-22 ENCOUNTER — APPOINTMENT (OUTPATIENT)
Dept: MEDICAL GROUP | Facility: PHYSICIAN GROUP | Age: 54
End: 2024-07-22
Payer: COMMERCIAL

## 2024-07-22 ENCOUNTER — HOSPITAL ENCOUNTER (OUTPATIENT)
Dept: LAB | Facility: MEDICAL CENTER | Age: 54
End: 2024-07-22
Payer: COMMERCIAL

## 2024-07-22 VITALS
WEIGHT: 198 LBS | HEIGHT: 71 IN | TEMPERATURE: 98 F | BODY MASS INDEX: 27.72 KG/M2 | DIASTOLIC BLOOD PRESSURE: 86 MMHG | SYSTOLIC BLOOD PRESSURE: 126 MMHG | OXYGEN SATURATION: 97 % | RESPIRATION RATE: 14 BRPM | HEART RATE: 83 BPM

## 2024-07-22 DIAGNOSIS — E78.1 HYPERTRIGLYCERIDEMIA: ICD-10-CM

## 2024-07-22 LAB
CHOLEST SERPL-MCNC: 172 MG/DL (ref 100–199)
HDLC SERPL-MCNC: 19 MG/DL
LDLC SERPL CALC-MCNC: ABNORMAL MG/DL
TRIGL SERPL-MCNC: 972 MG/DL (ref 0–149)

## 2024-07-22 PROCEDURE — 3079F DIAST BP 80-89 MM HG: CPT

## 2024-07-22 PROCEDURE — 3074F SYST BP LT 130 MM HG: CPT

## 2024-07-22 PROCEDURE — 99214 OFFICE O/P EST MOD 30 MIN: CPT

## 2024-07-22 PROCEDURE — 80061 LIPID PANEL: CPT

## 2024-07-22 PROCEDURE — 36415 COLL VENOUS BLD VENIPUNCTURE: CPT

## 2024-07-22 RX ORDER — FENOFIBRATE 48 MG/1
48 TABLET, COATED ORAL DAILY
Qty: 90 TABLET | Refills: 3 | Status: SHIPPED | OUTPATIENT
Start: 2024-07-22

## 2024-07-22 RX ORDER — EZETIMIBE 10 MG/1
10 TABLET ORAL DAILY
Qty: 90 TABLET | Refills: 3 | Status: CANCELLED | OUTPATIENT
Start: 2024-07-22

## 2024-07-22 ASSESSMENT — FIBROSIS 4 INDEX: FIB4 SCORE: 0.99

## 2024-08-01 DIAGNOSIS — E78.1 HYPERTRIGLYCERIDEMIA: ICD-10-CM

## 2024-08-01 NOTE — TELEPHONE ENCOUNTER
Received request via: Pharmacy    Was the patient seen in the last year in this department? Yes    Does the patient have an active prescription (recently filled or refills available) for medication(s) requested? No    [facility]    Does the patient have group home Plus and need 100 day supply (blood pressure, diabetes and cholesterol meds only)? Patient does not have SCP

## 2024-08-06 RX ORDER — ICOSAPENT ETHYL 1 G/1
1 CAPSULE ORAL 2 TIMES DAILY WITH MEALS
Qty: 180 CAPSULE | Refills: 3 | Status: SHIPPED | OUTPATIENT
Start: 2024-08-06

## 2024-08-18 ENCOUNTER — HOSPITAL ENCOUNTER (OUTPATIENT)
Dept: RADIOLOGY | Facility: MEDICAL CENTER | Age: 54
End: 2024-08-18
Attending: FAMILY MEDICINE
Payer: COMMERCIAL

## 2024-08-18 DIAGNOSIS — G25.89 SCAPULAR DYSKINESIS: ICD-10-CM

## 2024-08-18 PROCEDURE — 72141 MRI NECK SPINE W/O DYE: CPT

## 2024-08-22 DIAGNOSIS — M50.30 DEGENERATIVE CERVICAL DISC: ICD-10-CM

## 2024-08-22 DIAGNOSIS — G25.89 SCAPULAR DYSKINESIS: ICD-10-CM

## 2024-09-09 ENCOUNTER — APPOINTMENT (OUTPATIENT)
Dept: PHYSICAL MEDICINE AND REHAB | Facility: MEDICAL CENTER | Age: 54
End: 2024-09-09
Payer: COMMERCIAL

## 2024-09-09 VITALS
OXYGEN SATURATION: 96 % | HEIGHT: 71 IN | WEIGHT: 198.4 LBS | DIASTOLIC BLOOD PRESSURE: 84 MMHG | HEART RATE: 82 BPM | TEMPERATURE: 98 F | BODY MASS INDEX: 27.77 KG/M2 | SYSTOLIC BLOOD PRESSURE: 118 MMHG

## 2024-09-09 DIAGNOSIS — M48.02 NEUROFORAMINAL STENOSIS OF CERVICAL SPINE: ICD-10-CM

## 2024-09-09 DIAGNOSIS — M54.2 CHRONIC NECK PAIN: ICD-10-CM

## 2024-09-09 DIAGNOSIS — M50.30 DDD (DEGENERATIVE DISC DISEASE), CERVICAL: ICD-10-CM

## 2024-09-09 DIAGNOSIS — M47.812 CERVICAL SPONDYLOSIS: ICD-10-CM

## 2024-09-09 DIAGNOSIS — G89.29 CHRONIC NECK PAIN: ICD-10-CM

## 2024-09-09 PROCEDURE — G2211 COMPLEX E/M VISIT ADD ON: HCPCS | Mod: 24 | Performed by: PHYSICAL MEDICINE & REHABILITATION

## 2024-09-09 PROCEDURE — 3079F DIAST BP 80-89 MM HG: CPT | Performed by: PHYSICAL MEDICINE & REHABILITATION

## 2024-09-09 PROCEDURE — 99204 OFFICE O/P NEW MOD 45 MIN: CPT | Performed by: PHYSICAL MEDICINE & REHABILITATION

## 2024-09-09 PROCEDURE — 1125F AMNT PAIN NOTED PAIN PRSNT: CPT | Performed by: PHYSICAL MEDICINE & REHABILITATION

## 2024-09-09 PROCEDURE — 3074F SYST BP LT 130 MM HG: CPT | Performed by: PHYSICAL MEDICINE & REHABILITATION

## 2024-09-09 ASSESSMENT — PAIN SCALES - GENERAL: PAINLEVEL: 4=SLIGHT-MODERATE PAIN

## 2024-09-09 ASSESSMENT — FIBROSIS 4 INDEX: FIB4 SCORE: 0.99

## 2024-09-09 ASSESSMENT — PATIENT HEALTH QUESTIONNAIRE - PHQ9: CLINICAL INTERPRETATION OF PHQ2 SCORE: 0

## 2024-09-09 NOTE — PROGRESS NOTES
New patient note    Interventional spine and Pain  Physiatry (physical medicine and  Rehabilitation)     Date of service: See epic    Chief complaint:   Chief Complaint   Patient presents with    New Patient     Neck pain        Referring provider: Randy Ramirez M.D.     HISTORY    HPI: Jamie Bosch Jr. 54 y.o.  who presents today with Diagnoses of Chronic neck pain, Cervical spondylosis, DDD (degenerative disc disease), cervical, and Neuroforaminal stenosis of cervical spine with no signs of cervical radiculopathy were pertinent to this visit.    HPI    Verbal consent was obtained for [place] copilot: Yes      History of Present Illness  The painThe patient is a 54-year-old male here for a new patient visit. He was previously seen by Dr. Ramirez. Previous notes from Dr. Ramirez, including those from 06/17/2024 and the orders on the encounter from 08/22/2024, were reviewed.    He reports pain on the left side of his neck and associated pain to the left trapezius region, accompanied by muscle spasms in the same area.  He also has associated headaches he experiences tightness in his neck and shoulder area. Despite undergoing massage therapy and physical therapy, he found no relief, as some exercises exacerbated his discomfort. He has tried medications including NSAIDs, Flexeril, and acetaminophen, but these have not provided significant relief. A previous doctor administered a saline solution injection, but this did not alleviate his symptoms.     Occasionally, he experiences headaches that originate at the back of his head and extend towards his ear. He has sought treatment from a massage therapist, physical therapy, trigger point injections as recommended by Dr. Ahmadi and his primary care physician, but this did not provide significant relief.    He has had an MRI of the cervical spine and was given a home exercise program by a previous provider.       Neck pain and spasms Is up to 7 out of 10 in intensity on  "numerical rating scale.        Medical records review:  I reviewed the note from the referring provider Randy Ramirez M.D. including the note dated 8/22/2024, 6/17/2024..           ROS:   Red Flags ROS:   Fever, Chills, Sweats: Denies  Involuntary Weight Loss: Denies  Bladder Incontinence: Denies  Bowel Incontinence: denies  Saddle Anesthesia: Denies    All other systems reviewed and negative.       PMHx:   Past Medical History:   Diagnosis Date    Bowel habit changes     \"loose stools\" chronic     Fatty liver disease, nonalcoholic 4/12/2016    GERD (gastroesophageal reflux disease)     Heart burn     High cholesterol     History of duodenal ulcer     Indigestion     Testicular mass 4/30/2019    Umbilical hernia without obstruction and without gangrene 6/25/2015         Current Outpatient Medications on File Prior to Visit   Medication Sig Dispense Refill    Icosapent Ethyl 1 g Cap Take 1 Capsule by mouth 2 times a day with meals. 180 Capsule 3    fenofibrate (TRICOR) 48 MG Tab Take 1 Tablet by mouth every day. 90 Tablet 3    TESTOSTERONE CYPIONATE INJ       cyclobenzaprine (FLEXERIL) 10 mg Tab Take 1 Tablet by mouth 3 times a day as needed for Muscle Spasms. 30 Tablet 0    rosuvastatin (CRESTOR) 5 MG Tab Take 1 Tablet by mouth every evening. 90 Tablet 3    pantoprazole (PROTONIX) 40 MG Tablet Delayed Response Take 1 Tablet by mouth every day. 90 Tablet 3    famotidine (PEPCID) 20 MG Tab Take 1 Tablet by mouth 2 times a day. 60 Tablet 0     No current facility-administered medications on file prior to visit.        PSHx:   Past Surgical History:   Procedure Laterality Date    CHARLIE BY LAPAROSCOPY  9/1/2016    Procedure: CHARLIE BY LAPAROSCOPY;  Surgeon: Mike Banda M.D.;  Location: SURGERY [facility];  Service:     UMBILICAL HERNIA REPAIR  9/1/2016    Procedure: UMBILICAL HERNIA REPAIR;  Surgeon: Mike Banda M.D.;  Location: SURGERY [facility];  Service:     APPENDECTOMY LAPAROSCOPIC N/A " 7/23/2015    Procedure: APPENDECTOMY LAPAROSCOPIC;  Surgeon: Mike Banda M.D.;  Location: SURGERY [facility];  Service:     LIPOMA EXCISION  age 6    ND REPAIR PERF DUOD/MOLLY ULC-WND/INJ  age 27    VASECTOMY         Family history   Family History   Problem Relation Age of Onset    Stroke Mother     Heart Disease Mother     Diabetes Mother     Heart Disease Father     GI Disease Maternal Grandmother         cirrhosis - non-alcoholic    Diabetes Paternal Grandmother          Medications: reviewed on epic.   Outpatient Medications Marked as Taking for the 9/9/24 encounter (Office Visit) with Link Jain M.D.   Medication Sig Dispense Refill    Icosapent Ethyl 1 g Cap Take 1 Capsule by mouth 2 times a day with meals. 180 Capsule 3    fenofibrate (TRICOR) 48 MG Tab Take 1 Tablet by mouth every day. 90 Tablet 3    TESTOSTERONE CYPIONATE INJ       cyclobenzaprine (FLEXERIL) 10 mg Tab Take 1 Tablet by mouth 3 times a day as needed for Muscle Spasms. 30 Tablet 0    rosuvastatin (CRESTOR) 5 MG Tab Take 1 Tablet by mouth every evening. 90 Tablet 3    pantoprazole (PROTONIX) 40 MG Tablet Delayed Response Take 1 Tablet by mouth every day. 90 Tablet 3        Allergies:   Allergies   Allergen Reactions    Neomycin Swelling       Social Hx:   Social History     Socioeconomic History    Marital status:      Spouse name: Not on file    Number of children: Not on file    Years of education: Not on file    Highest education level: Not on file   Occupational History    Not on file   Tobacco Use    Smoking status: Never    Smokeless tobacco: Never   Vaping Use    Vaping status: Never Used   Substance and Sexual Activity    Alcohol use: Yes     Alcohol/week: 0.0 oz     Comment: Occasionally     Drug use: No    Sexual activity: Yes     Partners: Female     Birth control/protection: None   Other Topics Concern     Service No    Blood Transfusions Yes    Caffeine Concern No    Occupational Exposure Yes     "Hobby Hazards No    Sleep Concern No    Stress Concern No    Weight Concern No    Special Diet No    Back Care Yes    Exercise Yes    Bike Helmet Yes    Seat Belt Yes    Self-Exams Yes   Social History Narrative    Not on file     Social Determinants of Health     Financial Resource Strain: Not on file   Food Insecurity: Not on file   Transportation Needs: Not on file   Physical Activity: Not on file   Stress: Not on file   Social Connections: Not on file   Intimate Partner Violence: Not on file   Housing Stability: Not on file         EXAMINATION     Physical Exam:   Vitals: /84 (BP Location: Left arm, Patient Position: Sitting, BP Cuff Size: Adult)   Pulse 82   Temp 36.7 °C (98 °F) (Temporal)   Ht 1.803 m (5' 11\")   Wt 90 kg (198 lb 6.4 oz)   SpO2 96%     Constitutional:   Body Habitus: Body mass index is 27.67 kg/m².  Cooperation: Fully cooperates with exam  Appearance: Well-groomed, well-nourished, not disheveled     Eyes: No scleral icterus to suggest severe liver disease, no proptosis to suggest severe hyperthyroid    ENT -no obvious auditory deficits, no obvious tongue lesions, tongue midline, no facial droop     Skin -no rashes or lesions noted     Respiratory-  breathing comfortable on room air, no audible wheezing    Cardiovascular- capillary refills less than 2 seconds.     Psychiatric- alert and oriented ×3. Normal affect.       Musculoskeletal and Neuro -     Physical Exam         Cervical spine   Inspection: No deformities of the skin over the cervical spine. No rashes or lesions.    decreased  active range of motion in all directions, with  pain      Spurling’s sign: negative bilaterally    Facet loading maneuver is positive on the left at C2-3 and C3-4.  Negative on the right.    No signs of muscular atrophy in bilateral upper extremities     Positive for tenderness to palpation on the LEFT side in the cervical facets.       Key points for the international standards for neurological " "classification of spinal cord injury (ISNCSCI) to light touch.     Dermatome R L   C4 2 2   C5 2 2   C6 2 2   C7 2 2   C8 2 2   T1 2 2   T2 2 2                                     Motor Exam Upper Extremities   ? Myotome R L   Shoulder flexion C5 5 5   Elbow flexion C5 5 5   Wrist extension C6 5 5   Elbow extension C7 5 5   Finger flexion C8 5 5   Finger abduction T1 5 5     Reflexes  ?  R L   Biceps  2+ 2+   Brachioradialis  2+ 2+     Sloan’s sign negative bilaterally            MEDICAL DECISION MAKING    Medical records review: see under HPI section.     DATA    Labs:   Lab Results   Component Value Date/Time    SODIUM 139 04/22/2024 10:01 AM    POTASSIUM 4.6 04/22/2024 10:01 AM    CHLORIDE 102 04/22/2024 10:01 AM    CO2 24 04/22/2024 10:01 AM    ANION 13.0 04/22/2024 10:01 AM    GLUCOSE 104 (H) 04/22/2024 10:01 AM    BUN 15 04/22/2024 10:01 AM    CREATININE 0.70 04/22/2024 10:01 AM    CALCIUM 9.2 04/22/2024 10:01 AM    ASTSGOT 22 04/22/2024 10:01 AM    ALTSGPT 30 04/22/2024 10:01 AM    TBILIRUBIN 1.2 04/22/2024 10:01 AM    ALBUMIN 4.6 04/22/2024 10:01 AM    TOTPROTEIN 6.9 04/22/2024 10:01 AM    GLOBULIN 2.3 04/22/2024 10:01 AM    AGRATIO 2.0 04/22/2024 10:01 AM   ]    No results found for: \"PROTHROMBTM\", \"INR\"     Lab Results   Component Value Date/Time    WBC 9.3 12/10/2022 08:37 PM    RBC 5.13 12/10/2022 08:37 PM    HEMOGLOBIN 16.0 06/06/2024 09:12 AM    HEMATOCRIT 46.4 06/06/2024 09:12 AM    MCV 85.8 12/10/2022 08:37 PM    MCH 29.4 12/10/2022 08:37 PM    MCHC 34.3 12/10/2022 08:37 PM    MPV 10.9 12/10/2022 08:37 PM    NEUTSPOLYS 73.10 (H) 12/10/2022 08:37 PM    LYMPHOCYTES 12.20 (L) 12/10/2022 08:37 PM    MONOCYTES 11.90 12/10/2022 08:37 PM    EOSINOPHILS 2.20 12/10/2022 08:37 PM    BASOPHILS 0.20 12/10/2022 08:37 PM    HYPOCHROMIA 1+ 01/09/2013 12:00 PM        Lab Results   Component Value Date/Time    HBA1C 5.5 02/04/2019 01:11 PM        Imaging:   I personally reviewed following images, these are my " reads      Results  Imaging  MRI of the cervical spine shows multilevel foraminal stenosis and facet arthropathy.  Facet arthropathy include bilaterally at C2-3, C3-4, C4-5, C5-6         IMAGING radiology reads. I reviewed the following radiology reads                  Results for orders placed during the hospital encounter of 08/18/24    MR-CERVICAL SPINE-W/O    Impression  Multilevel degenerative changes as described level by level above.  Trace degenerative listheses at C6-C7 and C7-T1.      Results for orders placed during the hospital encounter of 05/31/18    MR-LUMBAR SPINE-W/O    Impression  Moderate degenerative change of the lumbar spine. No spinal canal narrowing.    Moderate neuroforaminal narrowing at L5-S1 bilaterally.        Results for orders placed during the hospital encounter of 05/31/18    MR-LUMBAR SPINE-W/O    Impression  Moderate degenerative change of the lumbar spine. No spinal canal narrowing.    Moderate neuroforaminal narrowing at L5-S1 bilaterally.                                       Results for orders placed in visit on 07/11/22    DX-CERVICAL SPINE-2 OR 3 VIEWS    Impression  1.  Straightening and mild to moderate degenerative change of cervical spine.  2.  No fracture or subluxation.     Results for orders placed in visit on 05/16/13    DX-CHEST-2 VIEWS    Impression  No active disease.            INTERPRETING LOCATION: [address] [place] [address], [facility]         Results for orders placed in visit on 07/21/22    DX-FOOT-COMPLETE 3+ RIGHT    Impression  No acute fracture or dislocation is noted.               Results for orders placed in visit on 06/21/18    DX-LUMBAR SPINE-2 OR 3 VIEWS    Impression  Multilevel degenerative disc disease and facet degeneration.    Multilevel degenerative subluxation which is stable with flexion and extension.                         Diagnosis   Visit Diagnoses     ICD-10-CM   1. Chronic neck pain  M54.2    G89.29   2. Cervical spondylosis  M47.812   3. DDD  (degenerative disc disease), cervical  M50.30   4. Neuroforaminal stenosis of cervical spine with no signs of cervical radiculopathy  M48.02           ASSESSMENT AND PLAN:  Jamie Bosch Jr. 54 y.o. male      Jamie was seen today for new patient.    Diagnoses and all orders for this visit:    Chronic neck pain    Cervical spondylosis  -     Referral to Physical Medicine Rehab  -     Referral to Physical Medicine Rehab    DDD (degenerative disc disease), cervical    Neuroforaminal stenosis of cervical spine with no signs of cervical radiculopathy        Assessment & Plan    The neck pain is likely due to arthritic changes with facet arthropathy in the cervical spine, contributing to muscle spasms and headaches. A nerve block procedure was discussed as a potential treatment option. The risks, benefits, and alternatives were thoroughly explained. If the nerve block is effective, a subsequent procedure to burn the nerve may be considered to provide longer-term relief. An order for a nerve block has been placed, and the procedure will be scheduled at the [facility].  He has done significant conservative treatments including the past 6 months for his chronic pain.  Treatments include the past 2 months within the past 6 months.        Physical therapy: The patient has completed physical therapy in the past and has done a home exercise program.      Diagnostic workup: Procedure below for diagnostic purposes    Medications:   Continue Flexeril as needed    Interventional program:    The patient is failed conservative treatments with medication management, home exercise program from the direction of physical therapy/physician including the past 6 months.  I have ordered a LEFT diagnostic medial branch block targeting the C2-3 and C3-4 facet joints #1 and #2.  Procedure #2 is only to be done if #1 is a positive block.    The risks benefits and alternatives to this procedure were discussed and the patient wishes to  proceed with the procedure. Risks include but are not limited to damage to surrounding structures, infection, bleeding, worsening of pain which can be permanent, weakness which can be permanent. Benefits include pain relief, improved function. Alternatives includes not doing the procedure.   If there are 2 positive blocks I would consider the patient for radiofrequency neurotomy of the previously blocked nerves.           Follow-up: After the above diagnostic procedures          Please note that this dictation was created using voice recognition software. I have made every reasonable attempt to correct obvious errors but there may be errors of grammar and content that I may have overlooked prior to finalization of this note.      Link Jain MD  Physical Medicine and Rehabilitation  Interventional Spine and Sports Physiatry  [facility]         CC JUAN DIEGO Addison   CC Randy Ramirez M.D.

## 2024-10-10 ENCOUNTER — APPOINTMENT (OUTPATIENT)
Dept: RADIOLOGY | Facility: MEDICAL CENTER | Age: 54
End: 2024-10-10
Attending: PHYSICAL MEDICINE & REHABILITATION
Payer: COMMERCIAL

## 2024-10-10 ENCOUNTER — HOSPITAL ENCOUNTER (OUTPATIENT)
Facility: MEDICAL CENTER | Age: 54
End: 2024-10-10
Attending: PHYSICAL MEDICINE & REHABILITATION | Admitting: PHYSICAL MEDICINE & REHABILITATION
Payer: COMMERCIAL

## 2024-10-10 VITALS
DIASTOLIC BLOOD PRESSURE: 94 MMHG | SYSTOLIC BLOOD PRESSURE: 131 MMHG | TEMPERATURE: 97.2 F | OXYGEN SATURATION: 95 % | HEART RATE: 79 BPM | RESPIRATION RATE: 16 BRPM

## 2024-10-10 PROCEDURE — 160048 HCHG OR STATISTICAL LEVEL 1-5: Performed by: PHYSICAL MEDICINE & REHABILITATION

## 2024-10-10 PROCEDURE — 160021 HCHG LOCAL: Performed by: PHYSICAL MEDICINE & REHABILITATION

## 2024-10-10 PROCEDURE — 700111 HCHG RX REV CODE 636 W/ 250 OVERRIDE (IP): Mod: JZ | Performed by: PHYSICAL MEDICINE & REHABILITATION

## 2024-10-10 PROCEDURE — 160046 HCHG PACU - 1ST 60 MINS PHASE II: Performed by: PHYSICAL MEDICINE & REHABILITATION

## 2024-10-10 PROCEDURE — 700101 HCHG RX REV CODE 250: Performed by: PHYSICAL MEDICINE & REHABILITATION

## 2024-10-10 PROCEDURE — 160025 RECOVERY II MINUTES (STATS): Performed by: PHYSICAL MEDICINE & REHABILITATION

## 2024-10-10 PROCEDURE — 160028 HCHG SURGERY MINUTES - 1ST 30 MINS LEVEL 3: Performed by: PHYSICAL MEDICINE & REHABILITATION

## 2024-10-10 PROCEDURE — 700117 HCHG RX CONTRAST REV CODE 255: Performed by: PHYSICAL MEDICINE & REHABILITATION

## 2024-10-10 RX ORDER — LIDOCAINE HYDROCHLORIDE 10 MG/ML
INJECTION, SOLUTION EPIDURAL; INFILTRATION; INTRACAUDAL; PERINEURAL
Status: DISCONTINUED | OUTPATIENT
Start: 2024-10-10 | End: 2024-10-10 | Stop reason: HOSPADM

## 2024-10-10 RX ORDER — LIDOCAINE HYDROCHLORIDE 20 MG/ML
INJECTION, SOLUTION EPIDURAL; INFILTRATION; INTRACAUDAL; PERINEURAL
Status: DISCONTINUED | OUTPATIENT
Start: 2024-10-10 | End: 2024-10-10 | Stop reason: HOSPADM

## 2024-10-10 ASSESSMENT — PAIN DESCRIPTION - PAIN TYPE: TYPE: CHRONIC PAIN

## 2024-10-15 ENCOUNTER — TELEPHONE (OUTPATIENT)
Dept: PHYSICAL MEDICINE AND REHAB | Facility: MEDICAL CENTER | Age: 54
End: 2024-10-15
Payer: COMMERCIAL

## 2024-11-14 ENCOUNTER — HOSPITAL ENCOUNTER (OUTPATIENT)
Facility: MEDICAL CENTER | Age: 54
End: 2024-11-14
Attending: PHYSICAL MEDICINE & REHABILITATION | Admitting: PHYSICAL MEDICINE & REHABILITATION
Payer: COMMERCIAL

## 2024-11-14 ENCOUNTER — APPOINTMENT (OUTPATIENT)
Dept: RADIOLOGY | Facility: MEDICAL CENTER | Age: 54
End: 2024-11-14
Attending: PHYSICAL MEDICINE & REHABILITATION
Payer: COMMERCIAL

## 2024-11-14 VITALS
BODY MASS INDEX: 27.16 KG/M2 | WEIGHT: 194 LBS | SYSTOLIC BLOOD PRESSURE: 134 MMHG | HEART RATE: 75 BPM | HEIGHT: 71 IN | DIASTOLIC BLOOD PRESSURE: 88 MMHG | TEMPERATURE: 97.5 F | OXYGEN SATURATION: 94 % | RESPIRATION RATE: 16 BRPM

## 2024-11-14 PROCEDURE — 700117 HCHG RX CONTRAST REV CODE 255: Performed by: PHYSICAL MEDICINE & REHABILITATION

## 2024-11-14 PROCEDURE — 160028 HCHG SURGERY MINUTES - 1ST 30 MINS LEVEL 3: Performed by: PHYSICAL MEDICINE & REHABILITATION

## 2024-11-14 PROCEDURE — 160025 RECOVERY II MINUTES (STATS): Performed by: PHYSICAL MEDICINE & REHABILITATION

## 2024-11-14 PROCEDURE — 160046 HCHG PACU - 1ST 60 MINS PHASE II: Performed by: PHYSICAL MEDICINE & REHABILITATION

## 2024-11-14 PROCEDURE — 700101 HCHG RX REV CODE 250: Performed by: PHYSICAL MEDICINE & REHABILITATION

## 2024-11-14 PROCEDURE — 160048 HCHG OR STATISTICAL LEVEL 1-5: Performed by: PHYSICAL MEDICINE & REHABILITATION

## 2024-11-14 PROCEDURE — 700111 HCHG RX REV CODE 636 W/ 250 OVERRIDE (IP): Mod: JZ | Performed by: PHYSICAL MEDICINE & REHABILITATION

## 2024-11-14 RX ORDER — LIDOCAINE HYDROCHLORIDE 10 MG/ML
INJECTION, SOLUTION EPIDURAL; INFILTRATION; INTRACAUDAL; PERINEURAL
Status: DISCONTINUED | OUTPATIENT
Start: 2024-11-14 | End: 2024-11-14 | Stop reason: HOSPADM

## 2024-11-14 RX ORDER — LIDOCAINE HYDROCHLORIDE 20 MG/ML
INJECTION, SOLUTION EPIDURAL; INFILTRATION; INTRACAUDAL; PERINEURAL
Status: DISCONTINUED | OUTPATIENT
Start: 2024-11-14 | End: 2024-11-14 | Stop reason: HOSPADM

## 2024-11-14 ASSESSMENT — PAIN DESCRIPTION - PAIN TYPE
TYPE: CHRONIC PAIN
TYPE: CHRONIC PAIN

## 2024-11-14 ASSESSMENT — FIBROSIS 4 INDEX: FIB4 SCORE: 0.99

## 2024-11-14 NOTE — OP REPORT
Date of Service: see epic time stamp for DOS     Patient: Jamie Bosch Jr. 54 y.o. male     MRN: [id number]      Physician/s: Link Jain MD     Pre-operative Diagnosis: Cervical spondylosis, facet arthropathy. The patient was NOT seen for cervical radiculopathy today.      Post-operative Diagnosis: Cervical spondylosis, facet arthropathy. The patient was NOT seen for cervical radiculopathy today.      Procedure:   diagnostic medial branch blocks targeting the LEFT C2-3 and C3-4 facet joints #2     Description of procedure:     The risks, benefits, and alternatives of the procedure were reviewed and discussed with the patient.  Written informed consent was freely obtained. A pre-procedural time-out was conducted by the physician verifying patient’s identity, procedure to be performed, procedure site and side, and allergy verification. Appropriate equipment was determined to be in place for the procedure.            The patient's vital signs were carefully monitored before, throughout, and after the procedure.      In the fluoroscopy suite the patient was placed in a prone position, a pillow placed underneath their chest, and the head was turned to the opposite side of injection. The skin was prepped and draped in the usual sterile fashion. The fluoroscope was placed over the cervical neck at the appropriate injection angles, and the targets for injection were marked at the third occipital nerve, cervical 3, cervical 4 levels. A 27g 1.5'' needle was placed into each of the markings levels as above, and approx 0.5mL of 1% Lidocaine was injected subcutaneously into the epidermal and dermal layers. The needle was removed. A 25g 3.5'' needle was then placed at the lateral aspect of the articular pillars, whereby the medial branch runs, at the third occipital nerve, cervical 3, cervical 4 levels on the LEFT side.    The needle tips were then verified by AP, contralateral oblique, and lateral views. In the AP view, less  than 1 cc of contrast dye was used to highlight the medial branch while the fluoroscope was running live. Following negative aspiration, approx 0.5mL 2% lidocaine was then injected at the above levels, and the needles were removed intact after restyleted. The patient's back was covered with a 4x4 gauze, the area was cleansed with sterile normal saline, and a dressing was applied.      There were no complications noted, and patient was hemodynamically stable before and after the procedure.      The patient had 100 percent pain relief postprocedure       Follow-up as scheduled     Link Jain MD  Interventional Spine and Pain  Physical Medicine and Rehabilitation  [facility]

## 2024-11-14 NOTE — H&P
"Physical medicine and rehabilitation preprocedure history & Physical Note    Date  11/14/2024    Primary Care Physician  JUAN DIEGO Addison    CC  Cervical spondylosis     HPI  He was previously seen by Dr. Ramirez. Previous notes from Dr. Ramirez, including those from 06/17/2024 and the orders on the encounter from 08/22/2024, were reviewed.     He reports pain on the left side of his neck and associated pain to the left trapezius region, accompanied by muscle spasms in the same area.  He also has associated headaches he experiences tightness in his neck and shoulder area. Despite undergoing massage therapy and physical therapy, he found no relief, as some exercises exacerbated his discomfort. He has tried medications including NSAIDs, Flexeril, and acetaminophen, but these have not provided significant relief. A previous doctor administered a saline solution injection, but this did not alleviate his symptoms.      Occasionally, he experiences headaches that originate at the back of his head and extend towards his ear. He has sought treatment from a massage therapist, physical therapy, trigger point injections as recommended by Dr. Ahmadi and his primary care physician, but this did not provide significant relief.     He has had an MRI of the cervical spine and was given a home exercise program by a previous provider.        Neck pain and spasms Is up to 7 out of 10 in intensity on numerical rating scale.     The patient had 100% pain relief after the diagnostic medial branch blocks targeting the same levels during the diagnostic phase.  Preprocedure pain 7/10 NRS postprocedure pain 0/10 NRS.  The pain returned back to baseline as expected after the diagnostic phase.  This is a positive diagnostic block      See previous notes of Dr. Jain    Past Medical History:   Diagnosis Date    Bowel habit changes     \"loose stools\" chronic     Fatty liver disease, nonalcoholic 4/12/2016    GERD (gastroesophageal " reflux disease)     Heart burn     High cholesterol     History of duodenal ulcer     Indigestion     Testicular mass 4/30/2019    Umbilical hernia without obstruction and without gangrene 6/25/2015       Past Surgical History:   Procedure Laterality Date    BLOCK OR Left 10/10/2024    Procedure: MEDIAL BRANCH BLOCKS TARGETING THE LEFT C2-3 AND C3-4 FACET JOINTS WITH FLUOROSCOPIC GUIDANCE #1;  Surgeon: Link Jain M.D.;  Location: SURGERY [facility];  Service: Pain Management    CHARLIE BY LAPAROSCOPY  9/1/2016    Procedure: CHARLIE BY LAPAROSCOPY;  Surgeon: Mike Banda M.D.;  Location: SURGERY [facility];  Service:     UMBILICAL HERNIA REPAIR  9/1/2016    Procedure: UMBILICAL HERNIA REPAIR;  Surgeon: Mike Banda M.D.;  Location: SURGERY [facility];  Service:     APPENDECTOMY LAPAROSCOPIC N/A 7/23/2015    Procedure: APPENDECTOMY LAPAROSCOPIC;  Surgeon: Mike Banda M.D.;  Location: SURGERY [facility];  Service:     LIPOMA EXCISION  age 6    HI REPAIR PERF DUOD/MOLLY [facility]-WND/INJ  age 27    VASECTOMY         No current facility-administered medications for this encounter.       Social History     Socioeconomic History    Marital status:      Spouse name: Not on file    Number of children: Not on file    Years of education: Not on file    Highest education level: Not on file   Occupational History    Not on file   Tobacco Use    Smoking status: Never    Smokeless tobacco: Never   Vaping Use    Vaping status: Never Used   Substance and Sexual Activity    Alcohol use: Yes     Alcohol/week: 0.0 oz     Comment: Occasionally     Drug use: No    Sexual activity: Yes     Partners: Female     Birth control/protection: None   Other Topics Concern     Service No    Blood Transfusions Yes    Caffeine Concern No    Occupational Exposure Yes    Hobby Hazards No    Sleep Concern No    Stress Concern No    Weight Concern No    Special Diet No    Back Care Yes    Exercise Yes    Bike  Helmet Yes    Seat Belt Yes    Self-Exams Yes   Social History Narrative    Not on file     Social Drivers of Health     Financial Resource Strain: Not on file   Food Insecurity: Not on file   Transportation Needs: Not on file   Physical Activity: Not on file   Stress: Not on file   Social Connections: Not on file   Intimate Partner Violence: Not on file   Housing Stability: Not on file       Family History   Problem Relation Age of Onset    Stroke Mother     Heart Disease Mother     Diabetes Mother     Heart Disease Father     GI Disease Maternal Grandmother         cirrhosis - non-alcoholic    Diabetes Paternal Grandmother        Allergies  Neomycin    Review of Systems  Comprehensive review of systems was negative       Physical Exam  Constitutional:       General: He is not in acute distress.     Appearance: Normal appearance. He is well-developed. He is not diaphoretic.   Cardiovascular:      Rate and Rhythm: Normal rate.   Pulmonary:      Effort: Pulmonary effort is normal.      Breath sounds: Normal breath sounds.   Abdominal:      Palpations: Abdomen is soft.   Skin:     General: Skin is warm and dry.      Capillary Refill: Capillary refill takes less than 2 seconds.   Neurological:      Mental Status: He is alert and oriented to person, place, and time.   Psychiatric:         Behavior: Behavior normal.         Thought Content: Thought content normal.         Judgment: Judgment normal.          Vital Signs  Blood Pressure: (!) 142/94   Temperature: 36.4 °C (97.5 °F)   Pulse: 83   Respiration: 16   Pulse Oximetry: 95 %     Vitals reviewed    Labs:                    Radiology:  DX-PORTABLE FLUOROSCOPY < 1 HOUR Reason For Exam: pain    (Results Pending)         Assessment/Plan:  Cervical spondylosis   Procedure(s):  MEDIAL BRANCH BLOCKS TARGETING THE LEFT C2-3 AND C3-4 FACET JOINTS WITH FLUOROSCOPIC GUIDANCE #2

## 2024-11-14 NOTE — OR SURGEON
Immediate Post OP Note    Pre op diagnosis   Cervical Spondylosis    Post-Op Diagnosis Codes:     * Cervical spondylosis [M47.812]      Procedure(s):  MEDIAL BRANCH BLOCKS TARGETING THE LEFT C2-3 AND C3-4 FACET JOINTS WITH FLUOROSCOPIC GUIDANCE #2 - Wound Class: Clean    Surgeon(s):  Link Jain M.D.    Anesthesiologist/Type of Anesthesia:  No anesthesia staff entered./Local    Surgical Staff:  Circulator: Sherlyn Cordova R.N.  Relief Circulator: Hien Celaya R.N.  Scrub Person: Katelin Valdez  Radiology Technologist: Dionne Ang    Specimens removed if any:  * No specimens in log *    Estimated Blood Loss: None    Findings: None    Complications: None        11/14/2024 1:16 PM Link Jain M.D.

## 2024-11-19 ENCOUNTER — TELEPHONE (OUTPATIENT)
Dept: PHYSICAL MEDICINE AND REHAB | Facility: MEDICAL CENTER | Age: 54
End: 2024-11-19
Payer: COMMERCIAL

## 2024-11-19 NOTE — TELEPHONE ENCOUNTER
Called for Post -SP check up: Medial branch blocks targeting the LEFT C2-3 and C3-4 facet joints with fluoroscopic guidance #2     Change in pain: Yes    Concerns? No    Confirmed [facility] appt? Yes

## 2024-11-21 ENCOUNTER — OFFICE VISIT (OUTPATIENT)
Dept: PHYSICAL MEDICINE AND REHAB | Facility: MEDICAL CENTER | Age: 54
End: 2024-11-21
Payer: COMMERCIAL

## 2024-11-21 VITALS
BODY MASS INDEX: 28.67 KG/M2 | SYSTOLIC BLOOD PRESSURE: 128 MMHG | OXYGEN SATURATION: 97 % | HEIGHT: 71 IN | DIASTOLIC BLOOD PRESSURE: 84 MMHG | HEART RATE: 85 BPM | WEIGHT: 204.81 LBS | TEMPERATURE: 98.5 F

## 2024-11-21 DIAGNOSIS — M47.812 CERVICAL SPONDYLOSIS: ICD-10-CM

## 2024-11-21 DIAGNOSIS — G89.29 CHRONIC NECK PAIN: ICD-10-CM

## 2024-11-21 DIAGNOSIS — M79.10 MYALGIA: ICD-10-CM

## 2024-11-21 DIAGNOSIS — M48.02 NEUROFORAMINAL STENOSIS OF CERVICAL SPINE: ICD-10-CM

## 2024-11-21 DIAGNOSIS — M50.30 DDD (DEGENERATIVE DISC DISEASE), CERVICAL: ICD-10-CM

## 2024-11-21 DIAGNOSIS — M54.2 CHRONIC NECK PAIN: ICD-10-CM

## 2024-11-21 PROCEDURE — G2211 COMPLEX E/M VISIT ADD ON: HCPCS | Performed by: STUDENT IN AN ORGANIZED HEALTH CARE EDUCATION/TRAINING PROGRAM

## 2024-11-21 PROCEDURE — 3074F SYST BP LT 130 MM HG: CPT | Performed by: STUDENT IN AN ORGANIZED HEALTH CARE EDUCATION/TRAINING PROGRAM

## 2024-11-21 PROCEDURE — 99214 OFFICE O/P EST MOD 30 MIN: CPT | Performed by: STUDENT IN AN ORGANIZED HEALTH CARE EDUCATION/TRAINING PROGRAM

## 2024-11-21 PROCEDURE — 1125F AMNT PAIN NOTED PAIN PRSNT: CPT | Performed by: STUDENT IN AN ORGANIZED HEALTH CARE EDUCATION/TRAINING PROGRAM

## 2024-11-21 PROCEDURE — 3079F DIAST BP 80-89 MM HG: CPT | Performed by: STUDENT IN AN ORGANIZED HEALTH CARE EDUCATION/TRAINING PROGRAM

## 2024-11-21 ASSESSMENT — PATIENT HEALTH QUESTIONNAIRE - PHQ9: CLINICAL INTERPRETATION OF PHQ2 SCORE: 0

## 2024-11-21 ASSESSMENT — FIBROSIS 4 INDEX: FIB4 SCORE: 0.99

## 2024-11-21 ASSESSMENT — PAIN SCALES - GENERAL: PAINLEVEL_OUTOF10: 3=SLIGHT PAIN

## 2024-11-21 NOTE — Clinical Note
Jamie did great with your CMBB #2, with 100% pain relief for the duration of local anesthetic.  I ordered the cervical RFA.  He also reported a muscle knot sensation at his left periscapular region and would like to pursue trigger point injections with you at your follow-up appointment after the RFA.  I ordered that to be authorized as well.  Destinee

## 2024-11-21 NOTE — H&P (VIEW-ONLY)
"New patient note    Interventional spine and Pain  Physiatry (physical medicine and  Rehabilitation)     Date of service: See epic    Chief complaint:   Chief Complaint   Patient presents with    Follow-Up     neck pain- 1wk post SP        Referring provider: Randy Ramirez M.D.     HISTORY    HPI: Jamie Bosch Jr. 54 y.o.  who presents today with Diagnoses of Myalgia, Chronic neck pain, Cervical spondylosis, DDD (degenerative disc disease), cervical, and Neuroforaminal stenosis of cervical spine with no signs of cervical radiculopathy were pertinent to this visit.    HPI    Presents today after 11/14/2024 diagnostic medial branch blocks targeting the LEFT C2-3 and C3-4 facet joints #2 with Dr. Jain. He notes 100% pain relief for the anticipated duration of local anesthetic. He also notes ongoing relief, about 40% pain relief.    He reports that he uses another medical service called  for which complementary medical consultation is provided.  He was informed that a trigger point injection could be considered for a painful sensation of a muscle knot near his left scapular region.  He tries managing this by massaging with a baseball, with no significant improvement.  He has not had a trigger point injection before.  NSAIDs, Flexeril, and Tylenol have not significantly improved the symptoms.    Denies HA or radiating arm pain, numbness, tingling, or focal weakness.     Neck pain rates 3 out of 10 in intensity on numerical rating scale.            ROS:   Red Flags ROS:   Fever, Chills, Sweats: Denies  Involuntary Weight Loss: Denies  Bladder Incontinence: Denies  Bowel Incontinence: denies  Saddle Anesthesia: Denies    All other systems reviewed and negative.       PMHx:   Past Medical History:   Diagnosis Date    Bowel habit changes     \"loose stools\" chronic     Fatty liver disease, nonalcoholic 4/12/2016    GERD (gastroesophageal reflux disease)     Heart burn     High cholesterol     History of duodenal " ulcer     Indigestion     Testicular mass 4/30/2019    Umbilical hernia without obstruction and without gangrene 6/25/2015         Current Outpatient Medications on File Prior to Visit   Medication Sig Dispense Refill    Icosapent Ethyl 1 g Cap Take 1 Capsule by mouth 2 times a day with meals. 180 Capsule 3    fenofibrate (TRICOR) 48 MG Tab Take 1 Tablet by mouth every day. 90 Tablet 3    TESTOSTERONE CYPIONATE INJ       cyclobenzaprine (FLEXERIL) 10 mg Tab Take 1 Tablet by mouth 3 times a day as needed for Muscle Spasms. 30 Tablet 0    rosuvastatin (CRESTOR) 5 MG Tab Take 1 Tablet by mouth every evening. 90 Tablet 3    pantoprazole (PROTONIX) 40 MG Tablet Delayed Response Take 1 Tablet by mouth every day. 90 Tablet 3    famotidine (PEPCID) 20 MG Tab Take 1 Tablet by mouth 2 times a day. 60 Tablet 0     No current facility-administered medications on file prior to visit.        PSHx:   Past Surgical History:   Procedure Laterality Date    BLOCK OR Left 11/14/2024    Procedure: MEDIAL BRANCH BLOCKS TARGETING THE LEFT C2-3 AND C3-4 FACET JOINTS WITH FLUOROSCOPIC GUIDANCE #2;  Surgeon: Link Jain M.D.;  Location: [facility];  Service: Pain Management    BLOCK OR Left 10/10/2024    Procedure: MEDIAL BRANCH BLOCKS TARGETING THE LEFT C2-3 AND C3-4 FACET JOINTS WITH FLUOROSCOPIC GUIDANCE #1;  Surgeon: Link Jain M.D.;  Location: [facility];  Service: Pain Management    CHARLIE BY LAPAROSCOPY  9/1/2016    Procedure: CHARLIE BY LAPAROSCOPY;  Surgeon: Mike Banda M.D.;  Location: [facility];  Service:     UMBILICAL HERNIA REPAIR  9/1/2016    Procedure: UMBILICAL HERNIA REPAIR;  Surgeon: Mike Banda M.D.;  Location: [facility];  Service:     APPENDECTOMY LAPAROSCOPIC N/A 7/23/2015    Procedure: APPENDECTOMY LAPAROSCOPIC;  Surgeon: Mike Banda M.D.;  Location: [facility];  Service:     LIPOMA EXCISION  age 6    [place] REPAIR PERF DUOD/MOLLY  ULC-WND/INJ  age 27    VASECTOMY         Family history   Family History   Problem Relation Age of Onset    Stroke Mother     Heart Disease Mother     Diabetes Mother     Heart Disease Father     GI Disease Maternal Grandmother         cirrhosis - non-alcoholic    Diabetes Paternal Grandmother          Medications: reviewed on epic.   Outpatient Medications Marked as Taking for the 11/21/24 encounter (Office Visit) with Destinee Ortega M.D.   Medication Sig Dispense Refill    Icosapent Ethyl 1 g Cap Take 1 Capsule by mouth 2 times a day with meals. 180 Capsule 3    fenofibrate (TRICOR) 48 MG Tab Take 1 Tablet by mouth every day. 90 Tablet 3    TESTOSTERONE CYPIONATE INJ       cyclobenzaprine (FLEXERIL) 10 mg Tab Take 1 Tablet by mouth 3 times a day as needed for Muscle Spasms. 30 Tablet 0    rosuvastatin (CRESTOR) 5 MG Tab Take 1 Tablet by mouth every evening. 90 Tablet 3    pantoprazole (PROTONIX) 40 MG Tablet Delayed Response Take 1 Tablet by mouth every day. 90 Tablet 3    famotidine (PEPCID) 20 MG Tab Take 1 Tablet by mouth 2 times a day. 60 Tablet 0        Allergies:   Allergies   Allergen Reactions    Neomycin Swelling       Social Hx:   Social History     Socioeconomic History    Marital status:      Spouse name: Not on file    Number of children: Not on file    Years of education: Not on file    Highest education level: Not on file   Occupational History    Not on file   Tobacco Use    Smoking status: Never    Smokeless tobacco: Never   Vaping Use    Vaping status: Never Used   Substance and Sexual Activity    Alcohol use: Yes     Alcohol/week: 0.0 oz     Comment: Occasionally     Drug use: No    Sexual activity: Yes     Partners: Female     Birth control/protection: None   Other Topics Concern     Service No    Blood Transfusions Yes    Caffeine Concern No    Occupational Exposure Yes    Hobby Hazards No    Sleep Concern No    Stress Concern No    Weight Concern No    Special Diet No    Back  "Care Yes    Exercise Yes    Bike Helmet Yes    Seat Belt Yes    Self-Exams Yes   Social History Narrative    Not on file     Social Drivers of Health     Financial Resource Strain: Not on file   Food Insecurity: Not on file   Transportation Needs: Not on file   Physical Activity: Not on file   Stress: Not on file   Social Connections: Not on file   Intimate Partner Violence: Not on file   Housing Stability: Not on file         EXAMINATION     Physical Exam:   Vitals: /84 (BP Location: Right arm, Patient Position: Sitting, BP Cuff Size: Adult)   Pulse 85   Temp 36.9 °C (98.5 °F) (Temporal)   Ht 1.803 m (5' 11\")   Wt 92.9 kg (204 lb 12.9 oz)   SpO2 97%     Constitutional:   Body Habitus: Body mass index is 28.56 kg/m².  Cooperation: Fully cooperates with exam  Appearance: Well-groomed, well-nourished, not disheveled     Eyes: No scleral icterus to suggest severe liver disease, no proptosis to suggest severe hyperthyroid    ENT -no obvious auditory deficits, no obvious tongue lesions, tongue midline, no facial droop     Skin -no rashes or lesions noted     Respiratory-  breathing comfortable on room air, no audible wheezing    Cardiovascular- capillary refills less than 2 seconds.     Psychiatric- alert and oriented ×3. Normal affect.       Musculoskeletal and Neuro -     Physical Exam         Cervical spine   Inspection: No deformities of the skin over the cervical spine. No rashes or lesions.    decreased  active range of motion in all directions, with  pain      Spurling’s sign: negative bilaterally    Facet loading maneuver is positive on the left at C2-3 and C3-4.  Negative on the right.    No signs of muscular atrophy in bilateral upper extremities     Positive for tenderness to palpation on the LEFT side in the cervical facets.  Palpable myofascial tension with tenderness to palpation at left periscapular region.      Key points for the international standards for neurological classification of spinal " "cord injury (ISNCSCI) to light touch.     Dermatome R L   C4 2 2   C5 2 2   C6 2 2   C7 2 2   C8 2 2   T1 2 2   T2 2 2                                     Motor Exam Upper Extremities   ? Myotome R L   Shoulder flexion C5 5 5   Elbow flexion C5 5 5   Wrist extension C6 5 5   Elbow extension C7 5 5   Finger flexion C8 5 5   Finger abduction T1 5 5            MEDICAL DECISION MAKING    Medical records review: see under HPI section.     DATA    Labs:   Lab Results   Component Value Date/Time    SODIUM 139 04/22/2024 10:01 AM    POTASSIUM 4.6 04/22/2024 10:01 AM    CHLORIDE 102 04/22/2024 10:01 AM    CO2 24 04/22/2024 10:01 AM    ANION 13.0 04/22/2024 10:01 AM    GLUCOSE 104 (H) 04/22/2024 10:01 AM    BUN 15 04/22/2024 10:01 AM    CREATININE 0.70 04/22/2024 10:01 AM    CALCIUM 9.2 04/22/2024 10:01 AM    ASTSGOT 22 04/22/2024 10:01 AM    ALTSGPT 30 04/22/2024 10:01 AM    TBILIRUBIN 1.2 04/22/2024 10:01 AM    ALBUMIN 4.6 04/22/2024 10:01 AM    TOTPROTEIN 6.9 04/22/2024 10:01 AM    GLOBULIN 2.3 04/22/2024 10:01 AM    AGRATIO 2.0 04/22/2024 10:01 AM   ]    No results found for: \"PROTHROMBTM\", \"INR\"     Lab Results   Component Value Date/Time    WBC 9.3 12/10/2022 08:37 PM    RBC 5.13 12/10/2022 08:37 PM    HEMOGLOBIN 16.0 06/06/2024 09:12 AM    HEMATOCRIT 46.4 06/06/2024 09:12 AM    MCV 85.8 12/10/2022 08:37 PM    MCH 29.4 12/10/2022 08:37 PM    MCHC 34.3 12/10/2022 08:37 PM    MPV 10.9 12/10/2022 08:37 PM    NEUTSPOLYS 73.10 (H) 12/10/2022 08:37 PM    LYMPHOCYTES 12.20 (L) 12/10/2022 08:37 PM    MONOCYTES 11.90 12/10/2022 08:37 PM    EOSINOPHILS 2.20 12/10/2022 08:37 PM    BASOPHILS 0.20 12/10/2022 08:37 PM    HYPOCHROMIA 1+ 01/09/2013 12:00 PM        Lab Results   Component Value Date/Time    HBA1C 5.5 02/04/2019 01:11 PM        Imaging:   I personally reviewed following images, these are my reads      Results  Imaging  MRI of the cervical spine shows multilevel foraminal stenosis and facet arthropathy.  Facet arthropathy " include bilaterally at C2-3, C3-4, C4-5, C5-6         IMAGING radiology reads. I reviewed the following radiology reads                  Results for orders placed during the hospital encounter of 08/18/24    MR-CERVICAL SPINE-W/O    Impression  Multilevel degenerative changes as described level by level above.  Trace degenerative listheses at C6-C7 and C7-T1.      Results for orders placed during the hospital encounter of 05/31/18    MR-LUMBAR SPINE-W/O    Impression  Moderate degenerative change of the lumbar spine. No spinal canal narrowing.    Moderate neuroforaminal narrowing at L5-S1 bilaterally.        Results for orders placed during the hospital encounter of 05/31/18    MR-LUMBAR SPINE-W/O    Impression  Moderate degenerative change of the lumbar spine. No spinal canal narrowing.    Moderate neuroforaminal narrowing at L5-S1 bilaterally.                                       Results for orders placed in visit on 07/11/22    DX-CERVICAL SPINE-2 OR 3 VIEWS    Impression  1.  Straightening and mild to moderate degenerative change of cervical spine.  2.  No fracture or subluxation.     Results for orders placed in visit on 05/16/13    DX-CHEST-2 VIEWS    Impression  No active disease.            INTERPRETING LOCATION: [address] [place] [address], [id number]         Results for orders placed in visit on 07/21/22    DX-FOOT-COMPLETE 3+ RIGHT    Impression  No acute fracture or dislocation is noted.               Results for orders placed in visit on 06/21/18    DX-LUMBAR SPINE-2 OR 3 VIEWS    Impression  Multilevel degenerative disc disease and facet degeneration.    Multilevel degenerative subluxation which is stable with flexion and extension.                         Diagnosis   Visit Diagnoses     ICD-10-CM   1. Myalgia  M79.10   2. Chronic neck pain  M54.2    G89.29   3. Cervical spondylosis  M47.812   4. DDD (degenerative disc disease), cervical  M50.30   5. Neuroforaminal stenosis of cervical spine with no signs of  cervical radiculopathy  M48.02             ASSESSMENT AND PLAN:  Jamie Bosch Jr. 54 y.o. male      Jamie was seen today for follow-up.    Diagnoses and all orders for this visit:    Myalgia  -     Referral to Pain Clinic    Chronic neck pain    Cervical spondylosis  -     Referral to Pain Clinic    DDD (degenerative disc disease), cervical    Neuroforaminal stenosis of cervical spine with no signs of cervical radiculopathy            Physical therapy: The patient has completed physical therapy in the past and has done a home exercise program.      Diagnostic workup: Personally reviewed at today's visit:   Fluoroscopic images from 11/14/2024 diagnostic cervical medial branch blocks targeting left C2-3 and C3-4 facet joints indicating accurate needle placement for the intended procedure    Medications:   Continue Flexeril as needed    Interventional program:    The patient is failed conservative treatments with medication management, home exercise program from the direction of physical therapy/physician including the past 6 months.  I have ordered a LEFT radiofrequency neurotomy of medial branches targeting the C2-3 and C3-4 facet joints given over 80% relief of index pain for anticipated duration of local anesthetic after diagnostic left cervical medial branch blocks targeting C2-3 and C3-4 facet joints.  Discussed that this procedure will be done with Dr. Jain, his primary pain provider.  The risks benefits and alternatives to this procedure were discussed and the patient wishes to proceed with the procedure. Risks include but are not limited to damage to surrounding structures, infection, bleeding, worsening of pain which can be permanent, weakness which can be permanent, loss of sensation in the posterior occiput. Benefits include pain relief, improved function. Alternatives includes not doing the procedure.    -Discussed the possibility of a trigger point injection targeting his area of myofascial pain at his  follow-up after radiofrequency ablation.  He would like to proceed with this. - Trigger point injections under ultrasound guidance. The risks, benefits, and alternatives to this procedure were discussed and the patient wishes to proceed with the procedure. Risks include but are not limited to damage to surrounding structures, infection, bleeding, worsening of pain which can be permanent, and collapsed lung. Benefits include pain relief and improved function. Alternatives include not doing the procedure.      Follow-up: 4 weeks after cervical RFA, plan for trigger point injection at that visit          Destinee Ortega MD  Interventional Pain and Spine  Physical Medicine and Rehabilitation  [facility]

## 2024-11-21 NOTE — PROGRESS NOTES
"New patient note    Interventional spine and Pain  Physiatry (physical medicine and  Rehabilitation)     Date of service: See epic    Chief complaint:   Chief Complaint   Patient presents with    Follow-Up     neck pain- 1wk post SP        Referring provider: Randy Ramirez M.D.     HISTORY    HPI: Jamie Bosch Jr. 54 y.o.  who presents today with Diagnoses of Myalgia, Chronic neck pain, Cervical spondylosis, DDD (degenerative disc disease), cervical, and Neuroforaminal stenosis of cervical spine with no signs of cervical radiculopathy were pertinent to this visit.    HPI    Presents today after 11/14/2024 diagnostic medial branch blocks targeting the LEFT C2-3 and C3-4 facet joints #2 with Dr. Jain. He notes 100% pain relief for the anticipated duration of local anesthetic. He also notes ongoing relief, about 40% pain relief.    He reports that he uses another medical service called  for which complementary medical consultation is provided.  He was informed that a trigger point injection could be considered for a painful sensation of a muscle knot near his left scapular region.  He tries managing this by massaging with a baseball, with no significant improvement.  He has not had a trigger point injection before.  NSAIDs, Flexeril, and Tylenol have not significantly improved the symptoms.    Denies HA or radiating arm pain, numbness, tingling, or focal weakness.     Neck pain rates 3 out of 10 in intensity on numerical rating scale.            ROS:   Red Flags ROS:   Fever, Chills, Sweats: Denies  Involuntary Weight Loss: Denies  Bladder Incontinence: Denies  Bowel Incontinence: denies  Saddle Anesthesia: Denies    All other systems reviewed and negative.       PMHx:   Past Medical History:   Diagnosis Date    Bowel habit changes     \"loose stools\" chronic     Fatty liver disease, nonalcoholic 4/12/2016    GERD (gastroesophageal reflux disease)     Heart burn     High cholesterol     History of duodenal " ulcer     Indigestion     Testicular mass 4/30/2019    Umbilical hernia without obstruction and without gangrene 6/25/2015         Current Outpatient Medications on File Prior to Visit   Medication Sig Dispense Refill    Icosapent Ethyl 1 g Cap Take 1 Capsule by mouth 2 times a day with meals. 180 Capsule 3    fenofibrate (TRICOR) 48 MG Tab Take 1 Tablet by mouth every day. 90 Tablet 3    TESTOSTERONE CYPIONATE INJ       cyclobenzaprine (FLEXERIL) 10 mg Tab Take 1 Tablet by mouth 3 times a day as needed for Muscle Spasms. 30 Tablet 0    rosuvastatin (CRESTOR) 5 MG Tab Take 1 Tablet by mouth every evening. 90 Tablet 3    pantoprazole (PROTONIX) 40 MG Tablet Delayed Response Take 1 Tablet by mouth every day. 90 Tablet 3    famotidine (PEPCID) 20 MG Tab Take 1 Tablet by mouth 2 times a day. 60 Tablet 0     No current facility-administered medications on file prior to visit.        PSHx:   Past Surgical History:   Procedure Laterality Date    BLOCK OR Left 11/14/2024    Procedure: MEDIAL BRANCH BLOCKS TARGETING THE LEFT C2-3 AND C3-4 FACET JOINTS WITH FLUOROSCOPIC GUIDANCE #2;  Surgeon: Link Jain M.D.;  Location: [facility];  Service: Pain Management    BLOCK OR Left 10/10/2024    Procedure: MEDIAL BRANCH BLOCKS TARGETING THE LEFT C2-3 AND C3-4 FACET JOINTS WITH FLUOROSCOPIC GUIDANCE #1;  Surgeon: Link Jain M.D.;  Location: [facility];  Service: Pain Management    CHARLIE BY LAPAROSCOPY  9/1/2016    Procedure: CHARLIE BY LAPAROSCOPY;  Surgeon: Mike Banda M.D.;  Location: [facility];  Service:     UMBILICAL HERNIA REPAIR  9/1/2016    Procedure: UMBILICAL HERNIA REPAIR;  Surgeon: Mike Banda M.D.;  Location: [facility];  Service:     APPENDECTOMY LAPAROSCOPIC N/A 7/23/2015    Procedure: APPENDECTOMY LAPAROSCOPIC;  Surgeon: Mike Banda M.D.;  Location: [facility];  Service:     LIPOMA EXCISION  age 6    AL REPAIR PERF DUOD/MOLLY  ULC-WND/INJ  age 27    VASECTOMY         Family history   Family History   Problem Relation Age of Onset    Stroke Mother     Heart Disease Mother     Diabetes Mother     Heart Disease Father     GI Disease Maternal Grandmother         cirrhosis - non-alcoholic    Diabetes Paternal Grandmother          Medications: reviewed on epic.   Outpatient Medications Marked as Taking for the 11/21/24 encounter (Office Visit) with Destinee Ortega M.D.   Medication Sig Dispense Refill    Icosapent Ethyl 1 g Cap Take 1 Capsule by mouth 2 times a day with meals. 180 Capsule 3    fenofibrate (TRICOR) 48 MG Tab Take 1 Tablet by mouth every day. 90 Tablet 3    TESTOSTERONE CYPIONATE INJ       cyclobenzaprine (FLEXERIL) 10 mg Tab Take 1 Tablet by mouth 3 times a day as needed for Muscle Spasms. 30 Tablet 0    rosuvastatin (CRESTOR) 5 MG Tab Take 1 Tablet by mouth every evening. 90 Tablet 3    pantoprazole (PROTONIX) 40 MG Tablet Delayed Response Take 1 Tablet by mouth every day. 90 Tablet 3    famotidine (PEPCID) 20 MG Tab Take 1 Tablet by mouth 2 times a day. 60 Tablet 0        Allergies:   Allergies   Allergen Reactions    Neomycin Swelling       Social Hx:   Social History     Socioeconomic History    Marital status:      Spouse name: Not on file    Number of children: Not on file    Years of education: Not on file    Highest education level: Not on file   Occupational History    Not on file   Tobacco Use    Smoking status: Never    Smokeless tobacco: Never   Vaping Use    Vaping status: Never Used   Substance and Sexual Activity    Alcohol use: Yes     Alcohol/week: 0.0 oz     Comment: Occasionally     Drug use: No    Sexual activity: Yes     Partners: Female     Birth control/protection: None   Other Topics Concern     Service No    Blood Transfusions Yes    Caffeine Concern No    Occupational Exposure Yes    Hobby Hazards No    Sleep Concern No    Stress Concern No    Weight Concern No    Special Diet No    Back  "Care Yes    Exercise Yes    Bike Helmet Yes    Seat Belt Yes    Self-Exams Yes   Social History Narrative    Not on file     Social Drivers of Health     Financial Resource Strain: Not on file   Food Insecurity: Not on file   Transportation Needs: Not on file   Physical Activity: Not on file   Stress: Not on file   Social Connections: Not on file   Intimate Partner Violence: Not on file   Housing Stability: Not on file         EXAMINATION     Physical Exam:   Vitals: /84 (BP Location: Right arm, Patient Position: Sitting, BP Cuff Size: Adult)   Pulse 85   Temp 36.9 °C (98.5 °F) (Temporal)   Ht 1.803 m (5' 11\")   Wt 92.9 kg (204 lb 12.9 oz)   SpO2 97%     Constitutional:   Body Habitus: Body mass index is 28.56 kg/m².  Cooperation: Fully cooperates with exam  Appearance: Well-groomed, well-nourished, not disheveled     Eyes: No scleral icterus to suggest severe liver disease, no proptosis to suggest severe hyperthyroid    ENT -no obvious auditory deficits, no obvious tongue lesions, tongue midline, no facial droop     Skin -no rashes or lesions noted     Respiratory-  breathing comfortable on room air, no audible wheezing    Cardiovascular- capillary refills less than 2 seconds.     Psychiatric- alert and oriented ×3. Normal affect.       Musculoskeletal and Neuro -     Physical Exam         Cervical spine   Inspection: No deformities of the skin over the cervical spine. No rashes or lesions.    decreased  active range of motion in all directions, with  pain      Spurling’s sign: negative bilaterally    Facet loading maneuver is positive on the left at C2-3 and C3-4.  Negative on the right.    No signs of muscular atrophy in bilateral upper extremities     Positive for tenderness to palpation on the LEFT side in the cervical facets.  Palpable myofascial tension with tenderness to palpation at left periscapular region.      Key points for the international standards for neurological classification of spinal " "cord injury (ISNCSCI) to light touch.     Dermatome R L   C4 2 2   C5 2 2   C6 2 2   C7 2 2   C8 2 2   T1 2 2   T2 2 2                                     Motor Exam Upper Extremities   ? Myotome R L   Shoulder flexion C5 5 5   Elbow flexion C5 5 5   Wrist extension C6 5 5   Elbow extension C7 5 5   Finger flexion C8 5 5   Finger abduction T1 5 5            MEDICAL DECISION MAKING    Medical records review: see under HPI section.     DATA    Labs:   Lab Results   Component Value Date/Time    SODIUM 139 04/22/2024 10:01 AM    POTASSIUM 4.6 04/22/2024 10:01 AM    CHLORIDE 102 04/22/2024 10:01 AM    CO2 24 04/22/2024 10:01 AM    ANION 13.0 04/22/2024 10:01 AM    GLUCOSE 104 (H) 04/22/2024 10:01 AM    BUN 15 04/22/2024 10:01 AM    CREATININE 0.70 04/22/2024 10:01 AM    CALCIUM 9.2 04/22/2024 10:01 AM    ASTSGOT 22 04/22/2024 10:01 AM    ALTSGPT 30 04/22/2024 10:01 AM    TBILIRUBIN 1.2 04/22/2024 10:01 AM    ALBUMIN 4.6 04/22/2024 10:01 AM    TOTPROTEIN 6.9 04/22/2024 10:01 AM    GLOBULIN 2.3 04/22/2024 10:01 AM    AGRATIO 2.0 04/22/2024 10:01 AM   ]    No results found for: \"PROTHROMBTM\", \"INR\"     Lab Results   Component Value Date/Time    WBC 9.3 12/10/2022 08:37 PM    RBC 5.13 12/10/2022 08:37 PM    HEMOGLOBIN 16.0 06/06/2024 09:12 AM    HEMATOCRIT 46.4 06/06/2024 09:12 AM    MCV 85.8 12/10/2022 08:37 PM    MCH 29.4 12/10/2022 08:37 PM    MCHC 34.3 12/10/2022 08:37 PM    MPV 10.9 12/10/2022 08:37 PM    NEUTSPOLYS 73.10 (H) 12/10/2022 08:37 PM    LYMPHOCYTES 12.20 (L) 12/10/2022 08:37 PM    MONOCYTES 11.90 12/10/2022 08:37 PM    EOSINOPHILS 2.20 12/10/2022 08:37 PM    BASOPHILS 0.20 12/10/2022 08:37 PM    HYPOCHROMIA 1+ 01/09/2013 12:00 PM        Lab Results   Component Value Date/Time    HBA1C 5.5 02/04/2019 01:11 PM        Imaging:   I personally reviewed following images, these are my reads      Results  Imaging  MRI of the cervical spine shows multilevel foraminal stenosis and facet arthropathy.  Facet arthropathy " include bilaterally at C2-3, C3-4, C4-5, C5-6         IMAGING radiology reads. I reviewed the following radiology reads                  Results for orders placed during the hospital encounter of 08/18/24    MR-CERVICAL SPINE-W/O    Impression  Multilevel degenerative changes as described level by level above.  Trace degenerative listheses at C6-C7 and C7-T1.      Results for orders placed during the hospital encounter of 05/31/18    MR-LUMBAR SPINE-W/O    Impression  Moderate degenerative change of the lumbar spine. No spinal canal narrowing.    Moderate neuroforaminal narrowing at L5-S1 bilaterally.        Results for orders placed during the hospital encounter of 05/31/18    MR-LUMBAR SPINE-W/O    Impression  Moderate degenerative change of the lumbar spine. No spinal canal narrowing.    Moderate neuroforaminal narrowing at L5-S1 bilaterally.                                       Results for orders placed in visit on 07/11/22    DX-CERVICAL SPINE-2 OR 3 VIEWS    Impression  1.  Straightening and mild to moderate degenerative change of cervical spine.  2.  No fracture or subluxation.     Results for orders placed in visit on 05/16/13    DX-CHEST-2 VIEWS    Impression  No active disease.            INTERPRETING LOCATION: [address] [place] [address], [id number]         Results for orders placed in visit on 07/21/22    DX-FOOT-COMPLETE 3+ RIGHT    Impression  No acute fracture or dislocation is noted.               Results for orders placed in visit on 06/21/18    DX-LUMBAR SPINE-2 OR 3 VIEWS    Impression  Multilevel degenerative disc disease and facet degeneration.    Multilevel degenerative subluxation which is stable with flexion and extension.                         Diagnosis   Visit Diagnoses     ICD-10-CM   1. Myalgia  M79.10   2. Chronic neck pain  M54.2    G89.29   3. Cervical spondylosis  M47.812   4. DDD (degenerative disc disease), cervical  M50.30   5. Neuroforaminal stenosis of cervical spine with no signs of  cervical radiculopathy  M48.02             ASSESSMENT AND PLAN:  Jamie Bosch Jr. 54 y.o. male      Jamie was seen today for follow-up.    Diagnoses and all orders for this visit:    Myalgia  -     Referral to Pain Clinic    Chronic neck pain    Cervical spondylosis  -     Referral to Pain Clinic    DDD (degenerative disc disease), cervical    Neuroforaminal stenosis of cervical spine with no signs of cervical radiculopathy            Physical therapy: The patient has completed physical therapy in the past and has done a home exercise program.      Diagnostic workup: Personally reviewed at today's visit:   Fluoroscopic images from 11/14/2024 diagnostic cervical medial branch blocks targeting left C2-3 and C3-4 facet joints indicating accurate needle placement for the intended procedure    Medications:   Continue Flexeril as needed    Interventional program:    The patient is failed conservative treatments with medication management, home exercise program from the direction of physical therapy/physician including the past 6 months.  I have ordered a LEFT radiofrequency neurotomy of medial branches targeting the C2-3 and C3-4 facet joints given over 80% relief of index pain for anticipated duration of local anesthetic after diagnostic left cervical medial branch blocks targeting C2-3 and C3-4 facet joints.  Discussed that this procedure will be done with Dr. Jain, his primary pain provider.  The risks benefits and alternatives to this procedure were discussed and the patient wishes to proceed with the procedure. Risks include but are not limited to damage to surrounding structures, infection, bleeding, worsening of pain which can be permanent, weakness which can be permanent, loss of sensation in the posterior occiput. Benefits include pain relief, improved function. Alternatives includes not doing the procedure.    -Discussed the possibility of a trigger point injection targeting his area of myofascial pain at his  follow-up after radiofrequency ablation.  He would like to proceed with this. - Trigger point injections under ultrasound guidance. The risks, benefits, and alternatives to this procedure were discussed and the patient wishes to proceed with the procedure. Risks include but are not limited to damage to surrounding structures, infection, bleeding, worsening of pain which can be permanent, and collapsed lung. Benefits include pain relief and improved function. Alternatives include not doing the procedure.      Follow-up: 4 weeks after cervical RFA, plan for trigger point injection at that visit          Destinee Ortega MD  Interventional Pain and Spine  Physical Medicine and Rehabilitation  [facility]

## 2024-12-12 ENCOUNTER — HOSPITAL ENCOUNTER (OUTPATIENT)
Facility: MEDICAL CENTER | Age: 54
End: 2024-12-12
Attending: PHYSICAL MEDICINE & REHABILITATION | Admitting: PHYSICAL MEDICINE & REHABILITATION
Payer: COMMERCIAL

## 2024-12-12 ENCOUNTER — APPOINTMENT (OUTPATIENT)
Dept: RADIOLOGY | Facility: MEDICAL CENTER | Age: 54
End: 2024-12-12
Attending: PHYSICAL MEDICINE & REHABILITATION
Payer: COMMERCIAL

## 2024-12-12 VITALS
SYSTOLIC BLOOD PRESSURE: 135 MMHG | OXYGEN SATURATION: 94 % | DIASTOLIC BLOOD PRESSURE: 91 MMHG | BODY MASS INDEX: 28.09 KG/M2 | RESPIRATION RATE: 16 BRPM | WEIGHT: 200.62 LBS | HEART RATE: 73 BPM | HEIGHT: 71 IN | TEMPERATURE: 97.7 F

## 2024-12-12 PROCEDURE — 160029 HCHG SURGERY MINUTES - 1ST 30 MINS LEVEL 4: Performed by: PHYSICAL MEDICINE & REHABILITATION

## 2024-12-12 PROCEDURE — 99152 MOD SED SAME PHYS/QHP 5/>YRS: CPT

## 2024-12-12 PROCEDURE — 160046 HCHG PACU - 1ST 60 MINS PHASE II: Performed by: PHYSICAL MEDICINE & REHABILITATION

## 2024-12-12 PROCEDURE — 160025 RECOVERY II MINUTES (STATS): Performed by: PHYSICAL MEDICINE & REHABILITATION

## 2024-12-12 PROCEDURE — 700111 HCHG RX REV CODE 636 W/ 250 OVERRIDE (IP): Performed by: PHYSICAL MEDICINE & REHABILITATION

## 2024-12-12 PROCEDURE — 160048 HCHG OR STATISTICAL LEVEL 1-5: Performed by: PHYSICAL MEDICINE & REHABILITATION

## 2024-12-12 RX ORDER — ROPIVACAINE HYDROCHLORIDE 5 MG/ML
INJECTION, SOLUTION EPIDURAL; INFILTRATION; PERINEURAL
Status: DISCONTINUED | OUTPATIENT
Start: 2024-12-12 | End: 2024-12-12 | Stop reason: HOSPADM

## 2024-12-12 RX ORDER — LIDOCAINE HYDROCHLORIDE 10 MG/ML
INJECTION, SOLUTION INFILTRATION; PERINEURAL
Status: DISCONTINUED | OUTPATIENT
Start: 2024-12-12 | End: 2024-12-12 | Stop reason: HOSPADM

## 2024-12-12 RX ORDER — MIDAZOLAM HYDROCHLORIDE 1 MG/ML
INJECTION INTRAMUSCULAR; INTRAVENOUS
Status: DISCONTINUED | OUTPATIENT
Start: 2024-12-12 | End: 2024-12-12 | Stop reason: HOSPADM

## 2024-12-12 ASSESSMENT — PAIN DESCRIPTION - PAIN TYPE
TYPE: CHRONIC PAIN

## 2024-12-12 NOTE — OP REPORT
Date of Service: 12/12/2024    Physician/s: Link Jain MD    Pre-operative Diagnosis: Cervical spondylosis, facet arthropathy     Post-operative Diagnosis: Cervical spondylosis, facet arthropathy     Procedure: LEFT Medial Branch Radiofrequency neurotomy targeting the C2-3 and C3-4 facet joints    Description of procedure:    The patient was not treated for radiculopathy at this time    The risks, benefits, and alternatives of the procedure were reviewed and discussed with the patient.  Written informed consent was freely obtained. A pre-procedural time-out was conducted by the physician verifying patient’s identity, procedure to be performed, procedure site and side, and allergy verification. Appropriate equipment was determined to be in place for the procedure.     An IV was placed peripherally, the patient received 1mg of IV Versed for anxiolysis, and 50mcg  of IV Fentanyl for pain control. The patient's vital signs were carefully monitored before, throughout, and after the procedure. This was for greater than 15 minutes.     In the fluoroscopy suite the patient was placed in a lateral position, with the target side up. The skin was prepped and draped in the usual sterile fashion. The fluoroscope was placed over the cervical neck at the appropriate angles, and the targets for needle/probe placement were marked. A 25g needle was placed into each of the markings at the target levels, and 2mL of 1% Lidocaine was injected subcutaneously into the epidermal and dermal layers. The needle was removed.     An 54mm with a trident radiofrequency system introducer was then placed using a lateral approach to the aspect of the articular pillars, whereby the medial branch runs, at the LEFT third occipital nerve, cervical 3, cervical 4 levels on the LEFT side. This was targetting The needle/probe tips were then verified by AP, lateral, and contralateral oblique views. Once the needle/probe tips were in the optimal positions  for radiofrequency neurotomy, Motor stimulation is used as an extra precaution to ensure the needle position is off the cervical nerve roots, prior to each lesion.  There is no motor stimulation in the arm and there was twitching in the cervical paraspinous muscles as expected.  Following negative aspiration, 1cc of 1% Lidocaine was then injected at each location. After a wait period of approximately 1 minute, a radiofrequency lesion was then created at each level with a temperature of 80 degrees centigrade for 2 minutes and 30 seconds.           The radiofrequency probes were removed intact.      The patient's back was covered with a 4x4 gauze, the area was cleansed with sterile normal saline, and a dressing was applied. There were no complications noted, the patient remained hemodynamically stable, and the patient tolerated the procedure well.      The patient had 100% pain relief postprocedure  Preprocedure pain 6/10 NRS  Postprocedure pain 0 /10 NRS     Follow-up as scheduled    Link Jain MD  Interventional Spine and Pain  Physical Medicine and Rehabilitation  [facility]        CPT  Radiofrequency ablation (RFA) - cervical or thoracic (1st joint):  70766  Radiofrequency ablation (RFA) - cervical or thoracic (each additional joint):  48122  moderate procedural sedation first 15 minutes: 97036

## 2024-12-12 NOTE — INTERVAL H&P NOTE
Consented Procedure: RADIOFREQUENCY NEUROTOMY TARGETING THE MEDIAL BRANCHES INNERVATING THE LEFT C2-3 AND C3-4 FACET JOINTS  I have examined the patient, provided the risks, benefits, and alternatives to the procedure(s) indicated on the signed consent form, and the patient wishes to proceed.    H&P reviewed. The patient was examined and there are no changes to the H&P      Link Jain M.D.  12/12/24 1:26 PM

## 2024-12-12 NOTE — OR SURGEON
Immediate Post OP Note    Pre op diagnosis  Cervical spondylosis      Post-Op Diagnosis Codes:     * Cervical spondylosis [M47.812]      Procedure(s):  RADIOFREQUENCY NEUROTOMY TARGETING THE MEDIAL BRANCHES INNERVATING THE LEFT C2-3 AND C3-4 FACET JOINTS WITH SEDATION - Wound Class: Clean    Surgeon(s):  Link Jain M.D.    Anesthesiologist/Type of Anesthesia:  No anesthesia staff entered./Local. Sedation 1mg versed and 50mcg fentanyl    Surgical Staff:  Circulator: Hien Celaya R.N.  Scrub Person: Katelin Valdez  Radiology Technologist: Dionne Ang  Runner: Mira Castaneda R.N.    Specimens removed if any:  * No specimens in log *    Estimated Blood Loss: None    Findings: None    Complications: None        12/12/2024 2:11 PM Link Jain M.D.

## 2024-12-13 ENCOUNTER — TELEPHONE (OUTPATIENT)
Dept: PHYSICAL MEDICINE AND REHAB | Facility: MEDICAL CENTER | Age: 54
End: 2024-12-13
Payer: COMMERCIAL

## 2024-12-13 NOTE — TELEPHONE ENCOUNTER
"Called for post-sp check-up. Pt reported the following regarding the procedure site: Radiofrequency neurotomy targeting the medial branches innervating the LEFT C2-3 and C3-4 facet joints with sedation    Change in pain?: feeling \"very stiff\", says stiffness may be \"masking the normal pain\". But overall pt is happy with procedure.    Concerns?: nothing more than stiffness    Confirmed [facility] appt?: Yes, 1/13  "

## 2025-01-13 ENCOUNTER — APPOINTMENT (OUTPATIENT)
Dept: PHYSICAL MEDICINE AND REHAB | Facility: MEDICAL CENTER | Age: 55
End: 2025-01-13
Payer: COMMERCIAL

## 2025-01-13 ENCOUNTER — OFFICE VISIT (OUTPATIENT)
Dept: URGENT CARE | Facility: PHYSICIAN GROUP | Age: 55
End: 2025-01-13
Payer: COMMERCIAL

## 2025-01-13 VITALS
OXYGEN SATURATION: 93 % | HEIGHT: 71 IN | DIASTOLIC BLOOD PRESSURE: 96 MMHG | WEIGHT: 196.8 LBS | HEART RATE: 79 BPM | BODY MASS INDEX: 27.55 KG/M2 | TEMPERATURE: 98.8 F | SYSTOLIC BLOOD PRESSURE: 126 MMHG

## 2025-01-13 VITALS
WEIGHT: 195 LBS | RESPIRATION RATE: 12 BRPM | HEIGHT: 71 IN | TEMPERATURE: 98.4 F | BODY MASS INDEX: 27.3 KG/M2 | OXYGEN SATURATION: 98 % | DIASTOLIC BLOOD PRESSURE: 84 MMHG | SYSTOLIC BLOOD PRESSURE: 134 MMHG | HEART RATE: 80 BPM

## 2025-01-13 DIAGNOSIS — M54.2 CHRONIC NECK PAIN: ICD-10-CM

## 2025-01-13 DIAGNOSIS — M79.10 MYALGIA: ICD-10-CM

## 2025-01-13 DIAGNOSIS — B02.9 HERPES ZOSTER WITHOUT COMPLICATION: ICD-10-CM

## 2025-01-13 DIAGNOSIS — R21 RASH AND NONSPECIFIC SKIN ERUPTION: ICD-10-CM

## 2025-01-13 DIAGNOSIS — M79.2 NEUROPATHIC PAIN: ICD-10-CM

## 2025-01-13 DIAGNOSIS — L03.319 CELLULITIS OF TRUNK, UNSPECIFIED SITE OF TRUNK: ICD-10-CM

## 2025-01-13 DIAGNOSIS — M50.30 DDD (DEGENERATIVE DISC DISEASE), CERVICAL: ICD-10-CM

## 2025-01-13 DIAGNOSIS — M47.812 CERVICAL SPONDYLOSIS: ICD-10-CM

## 2025-01-13 DIAGNOSIS — G89.29 CHRONIC NECK PAIN: ICD-10-CM

## 2025-01-13 PROCEDURE — 99213 OFFICE O/P EST LOW 20 MIN: CPT | Performed by: PHYSICIAN ASSISTANT

## 2025-01-13 PROCEDURE — 3080F DIAST BP >= 90 MM HG: CPT | Performed by: PHYSICAL MEDICINE & REHABILITATION

## 2025-01-13 PROCEDURE — 3075F SYST BP GE 130 - 139MM HG: CPT | Performed by: PHYSICIAN ASSISTANT

## 2025-01-13 PROCEDURE — 1125F AMNT PAIN NOTED PAIN PRSNT: CPT | Performed by: PHYSICIAN ASSISTANT

## 2025-01-13 PROCEDURE — 3074F SYST BP LT 130 MM HG: CPT | Performed by: PHYSICAL MEDICINE & REHABILITATION

## 2025-01-13 PROCEDURE — 99214 OFFICE O/P EST MOD 30 MIN: CPT | Performed by: PHYSICAL MEDICINE & REHABILITATION

## 2025-01-13 PROCEDURE — 1125F AMNT PAIN NOTED PAIN PRSNT: CPT | Performed by: PHYSICAL MEDICINE & REHABILITATION

## 2025-01-13 PROCEDURE — G2211 COMPLEX E/M VISIT ADD ON: HCPCS | Mod: 24 | Performed by: PHYSICAL MEDICINE & REHABILITATION

## 2025-01-13 PROCEDURE — 3079F DIAST BP 80-89 MM HG: CPT | Performed by: PHYSICIAN ASSISTANT

## 2025-01-13 RX ORDER — GABAPENTIN 300 MG/1
300 CAPSULE ORAL 3 TIMES DAILY PRN
Qty: 90 CAPSULE | Refills: 5 | Status: SHIPPED | OUTPATIENT
Start: 2025-01-13

## 2025-01-13 RX ORDER — TRIAMCINOLONE ACETONIDE 1 MG/G
1 CREAM TOPICAL 2 TIMES DAILY
Qty: 45 G | Refills: 0 | Status: SHIPPED | OUTPATIENT
Start: 2025-01-13 | End: 2025-01-20

## 2025-01-13 ASSESSMENT — ENCOUNTER SYMPTOMS
FEVER: 0
DIZZINESS: 0
MYALGIAS: 0
HEADACHES: 0
CHILLS: 0

## 2025-01-13 ASSESSMENT — PATIENT HEALTH QUESTIONNAIRE - PHQ9: CLINICAL INTERPRETATION OF PHQ2 SCORE: 0

## 2025-01-13 ASSESSMENT — PAIN SCALES - GENERAL: PAINLEVEL_OUTOF10: 3=SLIGHT PAIN

## 2025-01-13 NOTE — PROGRESS NOTES
"Follow up patient note  Interventional spine and Pain  Physiatry (physical medicine and  Rehabilitation)       Chief complaint:   Chief Complaint   Patient presents with    Follow-Up     Neck pain          HISTORY    Please see new patient note by Dr Jain,  for more details.     HPI  Patient identification: Jamie Bosch Jr. ,  1970,   With Diagnoses of Myalgia, Cervical spondylosis, DDD (degenerative disc disease), cervical, Chronic neck pain, Cellulitis of trunk, Herpes zoster without complication, and Neuropathic pain were pertinent to this visit.       Verbal consent was obtained for [place] copilot : Yes      History of Present Illness  The patient is a 54-year-old male presenting for a follow-up visit. He is status post a cervical medial branch radiofrequency neurotomy at the C2-3 and C3-4 levels, performed on 2024.    The patient reports significant improvement in pain, with complete resolution of headaches and upper cervical pain, now rated at zero intensity. However, he experiences mild to moderate aching and burning sensations in the trapezius region. He describes the sensation as similar to a localized lesion, which he first noticed a few days post-procedure. He expresses concern about the possibility of herpes zoster, despite having received the shingles vaccine within the past 6 to 12 months. The pain is localized to the trapezius area and does not radiate. He also notes that the condition is gradually improving.    IMMUNIZATIONS  He received the shingles vaccine approximately 6 months to a year ago.         ROS Red Flags :   Fever, Chills, Sweats: Denies  Involuntary Weight Loss: Denies  Bowel/Bladder Incontinence: Denies  Saddle Anesthesia: Denies        PMHx:   Past Medical History:   Diagnosis Date    Bowel habit changes     \"loose stools\" chronic     Fatty liver disease, nonalcoholic 2016    GERD (gastroesophageal reflux disease)     Heart burn     High cholesterol     History of " duodenal ulcer     Indigestion     Testicular mass 4/30/2019    Umbilical hernia without obstruction and without gangrene 6/25/2015       PSHx:   Past Surgical History:   Procedure Laterality Date    WA DSTR NROLYTC AGNT PARVERTEB FCT SNGL CRVCL/THORA Left 12/12/2024    Procedure: RADIOFREQUENCY NEUROTOMY TARGETING THE MEDIAL BRANCHES INNERVATING THE LEFT C2-3 AND C3-4 FACET JOINTS;  Surgeon: Link Jain M.D.;  Location: SURGERY [facility];  Service: Pain Management    BLOCK OR Left 11/14/2024    Procedure: MEDIAL BRANCH BLOCKS TARGETING THE LEFT C2-3 AND C3-4 FACET JOINTS WITH FLUOROSCOPIC GUIDANCE #2;  Surgeon: Link Jain M.D.;  Location: [facility];  Service: Pain Management    BLOCK OR Left 10/10/2024    Procedure: MEDIAL BRANCH BLOCKS TARGETING THE LEFT C2-3 AND C3-4 FACET JOINTS WITH FLUOROSCOPIC GUIDANCE #1;  Surgeon: Link Jain M.D.;  Location: [facility];  Service: Pain Management    CHARLIE BY LAPAROSCOPY  9/1/2016    Procedure: CHARLIE BY LAPAROSCOPY;  Surgeon: Mike Banad M.D.;  Location: SURGERY [facility];  Service:     UMBILICAL HERNIA REPAIR  9/1/2016    Procedure: UMBILICAL HERNIA REPAIR;  Surgeon: Mike Banda M.D.;  Location: SURGERY [facility];  Service:     APPENDECTOMY LAPAROSCOPIC N/A 7/23/2015    Procedure: APPENDECTOMY LAPAROSCOPIC;  Surgeon: Mike Banda M.D.;  Location: SURGERY [facility];  Service:     LIPOMA EXCISION  age 6    WA REPAIR PERF DUOD/MOLLY ULC-WND/INJ  age 27    VASECTOMY         Family history   Family History   Problem Relation Age of Onset    Stroke Mother     Heart Disease Mother     Diabetes Mother     Heart Disease Father     GI Disease Maternal Grandmother         cirrhosis - non-alcoholic    Diabetes Paternal Grandmother          Medications:   Outpatient Medications Marked as Taking for the 1/13/25 encounter (Office Visit) with Link Jain M.D.   Medication Sig Dispense Refill    gabapentin  (NEURONTIN) 300 MG Cap Take 1 Capsule by mouth 3 times a day as needed (pain). 90 Capsule 5    Icosapent Ethyl 1 g Cap Take 1 Capsule by mouth 2 times a day with meals. 180 Capsule 3    fenofibrate (TRICOR) 48 MG Tab Take 1 Tablet by mouth every day. 90 Tablet 3    TESTOSTERONE CYPIONATE INJ       rosuvastatin (CRESTOR) 5 MG Tab Take 1 Tablet by mouth every evening. 90 Tablet 3    pantoprazole (PROTONIX) 40 MG Tablet Delayed Response Take 1 Tablet by mouth every day. 90 Tablet 3        Current Outpatient Medications on File Prior to Visit   Medication Sig Dispense Refill    Icosapent Ethyl 1 g Cap Take 1 Capsule by mouth 2 times a day with meals. 180 Capsule 3    fenofibrate (TRICOR) 48 MG Tab Take 1 Tablet by mouth every day. 90 Tablet 3    TESTOSTERONE CYPIONATE INJ       rosuvastatin (CRESTOR) 5 MG Tab Take 1 Tablet by mouth every evening. 90 Tablet 3    pantoprazole (PROTONIX) 40 MG Tablet Delayed Response Take 1 Tablet by mouth every day. 90 Tablet 3    cyclobenzaprine (FLEXERIL) 10 mg Tab Take 1 Tablet by mouth 3 times a day as needed for Muscle Spasms. 30 Tablet 0    famotidine (PEPCID) 20 MG Tab Take 1 Tablet by mouth 2 times a day. 60 Tablet 0     No current facility-administered medications on file prior to visit.         Allergies:   Allergies   Allergen Reactions    Neomycin Swelling       Social Hx:   Social History     Socioeconomic History    Marital status:      Spouse name: Not on file    Number of children: Not on file    Years of education: Not on file    Highest education level: Not on file   Occupational History    Not on file   Tobacco Use    Smoking status: Never    Smokeless tobacco: Never   Vaping Use    Vaping status: Never Used   Substance and Sexual Activity    Alcohol use: Yes     Alcohol/week: 0.0 oz     Comment: Occasionally     Drug use: No    Sexual activity: Yes     Partners: Female     Birth control/protection: None   Other Topics Concern     Service No    Blood  "Transfusions Yes    Caffeine Concern No    Occupational Exposure Yes    Hobby Hazards No    Sleep Concern No    Stress Concern No    Weight Concern No    Special Diet No    Back Care Yes    Exercise Yes    Bike Helmet Yes    Seat Belt Yes    Self-Exams Yes   Social History Narrative    Not on file     Social Drivers of Health     Financial Resource Strain: Not on file   Food Insecurity: Not on file   Transportation Needs: Not on file   Physical Activity: Not on file   Stress: Not on file   Social Connections: Not on file   Intimate Partner Violence: Not on file   Housing Stability: Not on file         EXAMINATION     Physical Exam:   Vitals: BP (!) 126/96 (BP Location: Right arm, Patient Position: Sitting, BP Cuff Size: Large adult)   Pulse 79   Temp 37.1 °C (98.8 °F) (Temporal)   Ht 1.803 m (5' 11\")   Wt 89.3 kg (196 lb 12.8 oz)   SpO2 93%     Constitutional:   Body Habitus: Body mass index is 27.45 kg/m².  Cooperation: Fully cooperates with exam  Appearance: Well-groomed no disheveled    Respiratory-  breathing comfortable on room air, no audible wheezing  Cardiovascular- capillary refills less than 2 seconds. No lower extremity edema is noted.   Psychiatric- alert and oriented ×3. Normal affect.    MSK and Neuro: -    Physical Exam  There are no signs of infection around the procedure sites on the skin. However, there does appear to be signs in a dermatomal distribution of shingles with possible secondary infection.         MEDICAL DECISION MAKING    DATA    Labs:   Lab Results   Component Value Date/Time    SODIUM 139 04/22/2024 10:01 AM    POTASSIUM 4.6 04/22/2024 10:01 AM    CHLORIDE 102 04/22/2024 10:01 AM    CO2 24 04/22/2024 10:01 AM    GLUCOSE 104 (H) 04/22/2024 10:01 AM    BUN 15 04/22/2024 10:01 AM    CREATININE 0.70 04/22/2024 10:01 AM        No results found for: \"PROTHROMBTM\", \"INR\"     Lab Results   Component Value Date/Time    WBC 9.3 12/10/2022 08:37 PM    RBC 5.13 12/10/2022 08:37 PM    " HEMOGLOBIN 16.0 06/06/2024 09:12 AM    HEMATOCRIT 46.4 06/06/2024 09:12 AM    MCV 85.8 12/10/2022 08:37 PM    MCH 29.4 12/10/2022 08:37 PM    MCHC 34.3 12/10/2022 08:37 PM    MPV 10.9 12/10/2022 08:37 PM    NEUTSPOLYS 73.10 (H) 12/10/2022 08:37 PM    LYMPHOCYTES 12.20 (L) 12/10/2022 08:37 PM    MONOCYTES 11.90 12/10/2022 08:37 PM    EOSINOPHILS 2.20 12/10/2022 08:37 PM    BASOPHILS 0.20 12/10/2022 08:37 PM    HYPOCHROMIA 1+ 01/09/2013 12:00 PM        Lab Results   Component Value Date/Time    HBA1C 5.5 02/04/2019 01:11 PM          Imaging:   I personally reviewed following images          Results         I reviewed the following radiology reports                     Results for orders placed during the hospital encounter of 08/18/24    MR-CERVICAL SPINE-W/O    Impression  Multilevel degenerative changes as described level by level above.  Trace degenerative listheses at C6-C7 and C7-T1.      Results for orders placed during the hospital encounter of 05/31/18    MR-LUMBAR SPINE-W/O    Impression  Moderate degenerative change of the lumbar spine. No spinal canal narrowing.    Moderate neuroforaminal narrowing at L5-S1 bilaterally.        Results for orders placed during the hospital encounter of 05/31/18    MR-LUMBAR SPINE-W/O    Impression  Moderate degenerative change of the lumbar spine. No spinal canal narrowing.    Moderate neuroforaminal narrowing at L5-S1 bilaterally.                                                                       Results for orders placed during the hospital encounter of 06/09/20    CT-ABDOMEN-PELVIS WITH    Impression  1.  No acute abnormality.  2.  Diverticulosis  3.  Atherosclerosis                    Results for orders placed in visit on 07/11/22    DX-CERVICAL SPINE-2 OR 3 VIEWS    Impression  1.  Straightening and mild to moderate degenerative change of cervical spine.  2.  No fracture or subluxation.     Results for orders placed in visit on 05/16/13    DX-CHEST-2  VIEWS    Impression  No active disease.            INTERPRETING LOCATION: [address] [place] [address], [id number]         Results for orders placed in visit on 22    DX-FOOT-COMPLETE 3+ RIGHT    Impression  No acute fracture or dislocation is noted.               Results for orders placed in visit on 18    DX-LUMBAR SPINE-2 OR 3 VIEWS    Impression  Multilevel degenerative disc disease and facet degeneration.    Multilevel degenerative subluxation which is stable with flexion and extension.                         DIAGNOSIS   Visit Diagnoses     ICD-10-CM   1. Myalgia  M79.10   2. Cervical spondylosis  M47.812   3. DDD (degenerative disc disease), cervical  M50.30   4. Chronic neck pain  M54.2    G89.29   5. Cellulitis of trunk  L03.319   6. Herpes zoster without complication  B02.9   7. Neuropathic pain  M79.2         ASSESSMENT and PLAN:     Jamie Bosch   1970 male      Jamie was seen today for follow-up.    Diagnoses and all orders for this visit:    Myalgia    Cervical spondylosis    DDD (degenerative disc disease), cervical    Chronic neck pain    Cellulitis of trunk    Herpes zoster without complication  -     gabapentin (NEURONTIN) 300 MG Cap; Take 1 Capsule by mouth 3 times a day as needed (pain).    Neuropathic pain  -     gabapentin (NEURONTIN) 300 MG Cap; Take 1 Capsule by mouth 3 times a day as needed (pain).        Assessment & Plan  Post-procedural follow-up for left C2-3 and C3-4 cervical medial branch radiofrequency neurotomy.  He reports significant improvement in pain with 100% improvement in headaches and upper neck pain, now zero in intensity and this was the index pain.     Suspected shingles with possible secondary infection unrelated to the above procedure  There are signs in a dermatomal distribution of shingles with possible secondary infection. He was informed that shingles can lead to secondary bacterial infections. He was advised to seek immediate medical attention at an urgent  care facility or with his primary care physician to rule out any potential infection. Over-the-counter medications such as ibuprofen or Tylenol were recommended for pain management. A prescription for gabapentin was provided, to be taken initially at night due to its potential sedative effects, and then up to three times daily as needed. He was instructed to discontinue the medication if it induces excessive fatigue. A follow-up appointment was scheduled for 1 month from now.    Follow-up  The patient will follow up in 1 month.    PROCEDURE  The patient underwent left C2-3 and C3-4 cervical medial branch radiofrequency neurotomy on 12/12/2024 with 100% improvement in the index pain            Thank you for allowing me to participate in the care of this patient. If you have any questions please not hesitate to contact me.             Please note that this dictation was created using voice recognition software. I have made every reasonable attempt to correct obvious errors but there may be errors of grammar and content that I may have overlooked prior to finalization of this note.      Link Jain MD  Interventional Spine and Sports Physiatry  Physical Medicine and Rehabilitation  [facility]

## 2025-01-14 NOTE — PROGRESS NOTES
Subjective:     CHIEF COMPLAINT  Chief Complaint   Patient presents with    Other     Possible shingles left shoulder blade x 1 month.        OMAR Bosch Jr. is a very pleasant 54 y.o. male who presents to the clinic with a rash on his left shoulder blade that has been present x 1 month.  Rash is primarily dry and fluctuates between being pruritic as well as a burning sensation.  Previously had trigger point injections in this area and has been wearing an adhesive near the area of the rash.  He was seen by his physiatrist today whom expressed concerns for potential shingles.  Patient is unknown if it previously had any vesicles in the area.  He has had chicken box as a child.  Recently received his shingles immunization within the last 12 months.    REVIEW OF SYSTEMS  Review of Systems   Constitutional:  Negative for chills, fever and malaise/fatigue.   Musculoskeletal:  Negative for myalgias.   Skin:  Positive for itching and rash.   Neurological:  Negative for dizziness and headaches.       PAST MEDICAL HISTORY  Patient Active Problem List    Diagnosis Date Noted    Muscle spasms of neck 06/29/2022    Muscle cramps at night 06/29/2022    Hypogonadism 01/17/2022    Gastroesophageal reflux disease without esophagitis 12/02/2021    Spermatocele 05/09/2019    Hypertriglyceridemia 04/12/2016       SURGICAL HISTORY   has a past surgical history that includes lipoma excision (age 6); repair perf duod/nicholas ulc-wnd/inj (age 27); appendectomy laparoscopic (N/A, 7/23/2015); rj by laparoscopy (9/1/2016); umbilical hernia repair (9/1/2016); vasectomy; block or (Left, 10/10/2024); block or (Left, 11/14/2024); and dstr nrolytc agnt parverteb fct sngl crvcl/thora (Left, 12/12/2024).    ALLERGIES  Allergies   Allergen Reactions    Neomycin Swelling       CURRENT MEDICATIONS  Home Medications       Reviewed by Lloyd Kasper P.A.-C. (Physician Assistant) on 01/13/25 at 1633  Med List Status: <None>     Medication Last Dose  "Status   cyclobenzaprine (FLEXERIL) 10 mg Tab  Active   famotidine (PEPCID) 20 MG Tab  Active   fenofibrate (TRICOR) 48 MG Tab Taking Active   gabapentin (NEURONTIN) 300 MG Cap Taking Active   Icosapent Ethyl 1 g Cap Taking Active   pantoprazole (PROTONIX) 40 MG Tablet Delayed Response Taking Active   rosuvastatin (CRESTOR) 5 MG Tab Taking Active   TESTOSTERONE CYPIONATE INJ  Active                    SOCIAL HISTORY  Social History     Tobacco Use    Smoking status: Never    Smokeless tobacco: Never   Vaping Use    Vaping status: Never Used   Substance and Sexual Activity    Alcohol use: Yes     Alcohol/week: 0.0 oz     Comment: Occasionally     Drug use: No    Sexual activity: Yes     Partners: Female     Birth control/protection: None       FAMILY HISTORY  Family History   Problem Relation Age of Onset    Stroke Mother     Heart Disease Mother     Diabetes Mother     Heart Disease Father     GI Disease Maternal Grandmother         cirrhosis - non-alcoholic    Diabetes Paternal Grandmother           Objective:     VITAL SIGNS: /84 (BP Location: Left arm, Patient Position: Sitting, BP Cuff Size: Adult long)   Pulse 80   Temp 36.9 °C (98.4 °F) (Temporal)   Resp 12   Ht 1.803 m (5' 11\")   Wt 88.5 kg (195 lb)   SpO2 98%   BMI 27.20 kg/m²     PHYSICAL EXAM  Physical Exam  Constitutional:       General: He is not in acute distress.     Appearance: Normal appearance. He is not ill-appearing, toxic-appearing or diaphoretic.   HENT:      Head: Normocephalic and atraumatic.   Eyes:      Conjunctiva/sclera: Conjunctivae normal.   Pulmonary:      Effort: Pulmonary effort is normal.   Musculoskeletal:      Cervical back: Normal range of motion.   Skin:     Findings: Rash present.      Comments: Patient has a dry erythematous plaque-like rash over the left scapula and 2 separate areas.  No vesicles present.  No active discharge or drainage.  A few small papules are present as well as some healing scabs in the area.  " No underlying fluctuance present.   Neurological:      General: No focal deficit present.      Mental Status: He is alert and oriented to person, place, and time. Mental status is at baseline.         Assessment/Plan:     1. Rash and nonspecific skin eruption  - triamcinolone acetonide (KENALOG) 0.1 % Cream; Apply 1 Application topically 2 times a day for 7 days.  Dispense: 45 g; Refill: 0      MDM/Comments:    Patient has an erythematous, dry papular rash on the left shoulder blade.  At this point difficult to tell if this was potentially herpes zoster.  States the rash started approximately 3-4 weeks ago so we are outside the antiviral window at this time.  He was previously wearing adhesive over this area which at this time seems more consistent with an irritant contact dermatitis.  There is no signs of secondary bacterial infection.  We will trial a course of topical triamcinolone and encouraged patient to call if he notices any change in symptoms or signs of infection.    Differential diagnosis, natural history, supportive care, and indications for immediate follow-up discussed. All questions answered. Patient agrees with the plan of care.    Follow-up as needed if symptoms worsen or fail to improve to PCP, Urgent care or Emergency Room.    I have personally reviewed prior external notes and test results pertinent to today's visit.  I have independently reviewed and interpreted all diagnostics ordered during this urgent care acute visit.   Discussed management options (risks,benefits, and alternatives to treatment). Pt expresses understanding and the treatment plan was agreed upon. Questions were encouraged and answered to pt's satisfaction.    Please note that this dictation was created using voice recognition software. I have made a reasonable attempt to correct obvious errors, but I expect that there are errors of grammar and possibly content that I did not discover before finalizing the note.

## 2025-01-20 DIAGNOSIS — E78.1 HYPERTRIGLYCERIDEMIA: ICD-10-CM

## 2025-01-20 DIAGNOSIS — K21.9 GASTROESOPHAGEAL REFLUX DISEASE WITHOUT ESOPHAGITIS: ICD-10-CM

## 2025-01-21 NOTE — TELEPHONE ENCOUNTER
Received request via: Pharmacy    Was the patient seen in the last year in this department? Yes    Does the patient have an active prescription (recently filled or refills available) for medication(s) requested? No    [facility]    Does the patient have senior living Plus and need 100-day supply? (This applies to ALL medications) Patient does not have SCP

## 2025-01-23 RX ORDER — ICOSAPENT ETHYL 1 G/1
1 CAPSULE ORAL
Qty: 30 CAPSULE | Refills: 11 | Status: SHIPPED | OUTPATIENT
Start: 2025-01-23

## 2025-01-23 RX ORDER — ROSUVASTATIN CALCIUM 5 MG/1
5 TABLET, COATED ORAL EVERY EVENING
Qty: 30 TABLET | Refills: 11 | Status: SHIPPED | OUTPATIENT
Start: 2025-01-23

## 2025-01-23 RX ORDER — PANTOPRAZOLE SODIUM 40 MG/1
40 TABLET, DELAYED RELEASE ORAL
Qty: 30 TABLET | Refills: 11 | Status: SHIPPED | OUTPATIENT
Start: 2025-01-23

## 2025-02-05 ENCOUNTER — TELEPHONE (OUTPATIENT)
Dept: MEDICAL GROUP | Facility: PHYSICIAN GROUP | Age: 55
End: 2025-02-05
Payer: COMMERCIAL

## 2025-02-05 ENCOUNTER — HOSPITAL ENCOUNTER (OUTPATIENT)
Dept: LAB | Facility: MEDICAL CENTER | Age: 55
End: 2025-02-05
Payer: COMMERCIAL

## 2025-02-05 ENCOUNTER — HOSPITAL ENCOUNTER (OUTPATIENT)
Dept: LAB | Facility: MEDICAL CENTER | Age: 55
End: 2025-02-05
Attending: PHYSICIAN ASSISTANT
Payer: COMMERCIAL

## 2025-02-05 DIAGNOSIS — E78.1 HYPERTRIGLYCERIDEMIA: ICD-10-CM

## 2025-02-05 LAB
ALBUMIN SERPL BCP-MCNC: 4.1 G/DL (ref 3.2–4.9)
ALBUMIN/GLOB SERPL: 1.4 G/DL
ALP SERPL-CCNC: 60 U/L (ref 30–99)
ALT SERPL-CCNC: 35 U/L (ref 2–50)
ANION GAP SERPL CALC-SCNC: 11 MMOL/L (ref 7–16)
AST SERPL-CCNC: 28 U/L (ref 12–45)
BILIRUB SERPL-MCNC: 0.9 MG/DL (ref 0.1–1.5)
BUN SERPL-MCNC: 14 MG/DL (ref 8–22)
CALCIUM ALBUM COR SERPL-MCNC: 9.4 MG/DL (ref 8.5–10.5)
CALCIUM SERPL-MCNC: 9.5 MG/DL (ref 8.5–10.5)
CHLORIDE SERPL-SCNC: 105 MMOL/L (ref 96–112)
CHOLEST SERPL-MCNC: 97 MG/DL (ref 100–199)
CO2 SERPL-SCNC: 24 MMOL/L (ref 20–33)
CREAT SERPL-MCNC: 0.91 MG/DL (ref 0.5–1.4)
FASTING STATUS PATIENT QL REPORTED: NORMAL
GFR SERPLBLD CREATININE-BSD FMLA CKD-EPI: 100 ML/MIN/1.73 M 2
GLOBULIN SER CALC-MCNC: 2.9 G/DL (ref 1.9–3.5)
GLUCOSE SERPL-MCNC: 104 MG/DL (ref 65–99)
HCT VFR BLD AUTO: 44.8 % (ref 42–52)
HDLC SERPL-MCNC: 22 MG/DL
HGB BLD-MCNC: 15 G/DL (ref 14–18)
LDLC SERPL CALC-MCNC: 16 MG/DL
POTASSIUM SERPL-SCNC: 4.5 MMOL/L (ref 3.6–5.5)
PROT SERPL-MCNC: 7 G/DL (ref 6–8.2)
PSA SERPL DL<=0.01 NG/ML-MCNC: 0.7 NG/ML (ref 0–4)
SODIUM SERPL-SCNC: 140 MMOL/L (ref 135–145)
TESTOST SERPL-MCNC: 1182 NG/DL (ref 175–781)
TRIGL SERPL-MCNC: 295 MG/DL (ref 0–149)

## 2025-02-05 PROCEDURE — 80053 COMPREHEN METABOLIC PANEL: CPT

## 2025-02-05 PROCEDURE — 36415 COLL VENOUS BLD VENIPUNCTURE: CPT

## 2025-02-05 PROCEDURE — 84153 ASSAY OF PSA TOTAL: CPT

## 2025-02-05 PROCEDURE — 84403 ASSAY OF TOTAL TESTOSTERONE: CPT

## 2025-02-05 PROCEDURE — 85014 HEMATOCRIT: CPT

## 2025-02-05 PROCEDURE — 85018 HEMOGLOBIN: CPT

## 2025-02-05 PROCEDURE — 80061 LIPID PANEL: CPT

## 2025-06-26 DIAGNOSIS — E78.1 HYPERTRIGLYCERIDEMIA: ICD-10-CM

## 2025-06-26 NOTE — TELEPHONE ENCOUNTER
Received request via: Pharmacy    Was the patient seen in the last year in this department? Yes    Does the patient have an active prescription (recently filled or refills available) for medication(s) requested? No    Pharmacy Name: [id number] - [place], [place] - [address] [address] [facility]     Does the patient have detention Plus and need 100-day supply? (This applies to ALL medications) Patient does not have SCP

## 2025-06-30 RX ORDER — FENOFIBRATE 48 MG/1
48 TABLET, FILM COATED ORAL DAILY
Qty: 90 TABLET | Refills: 0 | Status: SHIPPED | OUTPATIENT
Start: 2025-06-30

## 2025-06-30 NOTE — TELEPHONE ENCOUNTER
90 days supply given, will need an appointment for further refills. Please call [phone] to schedule

## 2025-07-30 ENCOUNTER — OFFICE VISIT (OUTPATIENT)
Dept: MEDICAL GROUP | Facility: PHYSICIAN GROUP | Age: 55
End: 2025-07-30
Payer: COMMERCIAL

## 2025-07-30 VITALS
OXYGEN SATURATION: 98 % | SYSTOLIC BLOOD PRESSURE: 148 MMHG | WEIGHT: 200 LBS | RESPIRATION RATE: 7 BRPM | TEMPERATURE: 97.5 F | BODY MASS INDEX: 28 KG/M2 | HEIGHT: 71 IN | DIASTOLIC BLOOD PRESSURE: 94 MMHG | HEART RATE: 80 BPM

## 2025-07-30 DIAGNOSIS — R30.0 DYSURIA: Primary | ICD-10-CM

## 2025-07-30 DIAGNOSIS — K57.90 DIVERTICULOSIS: ICD-10-CM

## 2025-07-30 DIAGNOSIS — R10.32 LEFT LOWER QUADRANT ABDOMINAL PAIN: ICD-10-CM

## 2025-07-30 DIAGNOSIS — E78.1 HYPERTRIGLYCERIDEMIA: ICD-10-CM

## 2025-07-30 DIAGNOSIS — Z12.5 ENCOUNTER FOR PROSTATE CANCER SCREENING: ICD-10-CM

## 2025-07-30 LAB
APPEARANCE UR: CLEAR
BILIRUB UR STRIP-MCNC: NORMAL MG/DL
COLOR UR AUTO: YELLOW
GLUCOSE UR STRIP.AUTO-MCNC: NORMAL MG/DL
KETONES UR STRIP.AUTO-MCNC: NORMAL MG/DL
LEUKOCYTE ESTERASE UR QL STRIP.AUTO: NORMAL
NITRITE UR QL STRIP.AUTO: NORMAL
PH UR STRIP.AUTO: 7 [PH] (ref 5–8)
PROT UR QL STRIP: NORMAL MG/DL
RBC UR QL AUTO: NORMAL
SP GR UR STRIP.AUTO: 1.01
UROBILINOGEN UR STRIP-MCNC: 0.2 MG/DL

## 2025-07-30 RX ORDER — ICOSAPENT ETHYL 1 G/1
1 CAPSULE ORAL
Qty: 290 CAPSULE | Refills: 3 | Status: SHIPPED | OUTPATIENT
Start: 2025-07-30

## 2025-07-30 RX ORDER — FENOFIBRATE 48 MG/1
48 TABLET, FILM COATED ORAL DAILY
Qty: 90 TABLET | Refills: 3 | Status: SHIPPED | OUTPATIENT
Start: 2025-07-30

## 2025-07-30 NOTE — PROGRESS NOTES
Verbal consent was acquired by the patient to use [employer] ambient listening note generation during this visit     Subjective:     HPI:   History of Present Illness  The patient is a 55-year-old male presenting with dyschezia and dysuria since July 20, 2025.    He suspects a recurrence of diverticulitis due to familiar symptoms. He recalls a previous episode characterized by severe dysuria, which necessitated an emergency room visit. The patient has undergone esophagogastroduodenoscopy (EGD) and colonoscopy procedures but does not recall the most recent one. He reports no pyrexia but experienced sharp left lower abdominal pain for approximately one week, which has since improved. He was informed that his diverticulitis is located near the bladder, causing irritation. He no longer experiences burning or pain during urination. Today, he had a solid bowel movement. He has not consulted a physician recently and believes his high coffee intake may be contributing to his hypertension. He reports no cephalalgia, chest pain, or dyspnea. He engages in daily physical activity, walking 3.5 miles. He does not remember his last visit to a gastroenterology specialist and requires a referral. His last colonoscopy was approximately five years ago. He experienced anorexia for 3-4 days when the pain commenced but resumed taking Metamucil once his appetite returned.    He recalls being treated for prostatitis in April 2019, which was precipitated by prolonged urinary retention for 2.5 hours, a situation that did not occur this time.    He is currently on rosuvastatin and requests a refill of icosapent ethyl.    Coffee/Tea/Caffeine-containing Drinks: Consumes 4 cups of coffee daily.    PAST SURGICAL HISTORY:  - Cholecystectomy  - Appendectomy  - Umbilical hernia repair  - Repair of perforated duodenal gastric ulcer at age 27        Assessment & Plan:     Problem List Items Addressed This Visit       Hypertriglyceridemia    Relevant  Medications    Icosapent Ethyl 1 g Cap    fenofibrate (TRICOR) 48 MG Tab    Other Relevant Orders    LIPASE    AMYLASE    Lipid Profile     Other Visit Diagnoses         Dysuria    -  Primary    Relevant Orders    POCT Urinalysis (Completed)      Left lower quadrant abdominal pain        Relevant Orders    Comp Metabolic Panel    CBC WITH DIFFERENTIAL      Encounter for prostate cancer screening        Relevant Orders    PROSTATE SPECIFIC AG SCREENING      Diverticulosis        Relevant Orders    Referral to Gastroenterology              Assessment & Plan  1. RLQ abdominal pain. Acute, resolved. No mucus or blood in stool. Afebrile. Symptoms have resolved, no burning with urination.   - Symptoms of sharp left lower abdominal pain and painful bowel movements suggest a flare-up. History supports this diagnosis.  - Antibiotics not recommended; likely inflammation.  - Referral for colonoscopy to assess diverticula  - Blood tests for infections, cholesterol, PSA, pancreatic, liver, kidney function, and electrolytes.  - Continue Metamucil with drinks.  - Report fevers or severe pain immediately.    2. Painful urination.Resolved   - Symptom improved, likely related to diverticulitis flare-up.  - No current treatment necessary; monitor for recurrence.  Results for orders placed or performed in visit on 07/30/25   POCT Urinalysis    Collection Time: 07/30/25  4:09 PM   Result Value Ref Range    POC Color YELLOW Negative    POC Appearance CLEAR Negative    POC Glucose NEG Negative mg/dL    POC Bilirubin NEG Negative mg/dL    POC Ketones NEG Negative mg/dL    POC Specific Gravity 1.015 <1.005 - >1.030    POC Blood NEG Negative    POC Urine PH 7 5.0 - 8.0    POC Protein NEG Negative mg/dL    POC Urobiligen 0.2 Negative (0.2) mg/dL    POC Nitrites NEG Negative    POC Leukocyte Esterase NEG Negative         3. Elevated blood pressure. New finding.   - Slightly elevated, potentially due to high coffee intake and recent pain. Blood  pressure noted at 148.  - Monitor blood pressure and reduce coffee consumption.  - No headaches, chest pain, or shortness of breath reported.    4. Hypertriglyceridemia. This is a chronic, stable medical condition.   - Initiate prior authorization for icosapent 1 gram QHS.   - Continue rosuvastatin and fenofibrate (Tricor).  - Reassess rosuvastatin need after reviewing triglyceride levels.    Follow-up  - Referral for colonoscopy.  - Blood tests for infections, cholesterol, PSA, pancreatic, liver, kidney function, and electrolytes.    Return in about 3 months (around 10/30/2025) for lab follow up.    Health Maintenance: Orders placed as applicable to patient     Objective:     Exam:  Objective:  Vitals:    07/30/25 1549   BP: (!) 148/94   Pulse: 80   Resp: (!) 7   Temp: 36.4 °C (97.5 °F)   SpO2: 98%     Physical Exam  Physical Exam    Constitutional:       Appearance: Normal appearance.   Eyes:      Extraocular Movements: Extraocular movements intact.   Pulmonary:      Effort: Pulmonary effort is normal.   Neurological:      General: No focal deficit present.      Mental Status: She is alert and oriented to person, place, and time.   Psychiatric:         Mood and Affect: Mood normal.         Behavior: Behavior normal.       Please note that this dictation was created using voice recognition software. I have made every reasonable attempt to correct obvious errors, but I expect that there are errors of grammar and possibly content that I did not discover before finalizing the note.

## 2025-08-04 ENCOUNTER — HOSPITAL ENCOUNTER (OUTPATIENT)
Dept: LAB | Facility: MEDICAL CENTER | Age: 55
End: 2025-08-04
Payer: COMMERCIAL

## 2025-08-04 DIAGNOSIS — E78.1 HYPERTRIGLYCERIDEMIA: ICD-10-CM

## 2025-08-04 DIAGNOSIS — Z12.5 ENCOUNTER FOR PROSTATE CANCER SCREENING: ICD-10-CM

## 2025-08-04 DIAGNOSIS — R10.32 LEFT LOWER QUADRANT ABDOMINAL PAIN: ICD-10-CM

## 2025-08-04 LAB
ALBUMIN SERPL BCP-MCNC: 4.4 G/DL (ref 3.2–4.9)
ALBUMIN/GLOB SERPL: 1.7 G/DL
ALP SERPL-CCNC: 68 U/L (ref 30–99)
ALT SERPL-CCNC: 32 U/L (ref 2–50)
AMYLASE SERPL-CCNC: 55 U/L (ref 20–103)
ANION GAP SERPL CALC-SCNC: 11 MMOL/L (ref 7–16)
AST SERPL-CCNC: 22 U/L (ref 12–45)
BASOPHILS # BLD AUTO: 0.3 % (ref 0–1.8)
BASOPHILS # BLD: 0.02 K/UL (ref 0–0.12)
BILIRUB SERPL-MCNC: 0.6 MG/DL (ref 0.1–1.5)
BUN SERPL-MCNC: 19 MG/DL (ref 8–22)
CALCIUM ALBUM COR SERPL-MCNC: 9.1 MG/DL (ref 8.5–10.5)
CALCIUM SERPL-MCNC: 9.4 MG/DL (ref 8.5–10.5)
CHLORIDE SERPL-SCNC: 107 MMOL/L (ref 96–112)
CHOLEST SERPL-MCNC: 131 MG/DL (ref 100–199)
CO2 SERPL-SCNC: 23 MMOL/L (ref 20–33)
CREAT SERPL-MCNC: 0.99 MG/DL (ref 0.5–1.4)
EOSINOPHIL # BLD AUTO: 0.15 K/UL (ref 0–0.51)
EOSINOPHIL NFR BLD: 1.9 % (ref 0–6.9)
ERYTHROCYTE [DISTWIDTH] IN BLOOD BY AUTOMATED COUNT: 43.1 FL (ref 35.9–50)
GFR SERPLBLD CREATININE-BSD FMLA CKD-EPI: 90 ML/MIN/1.73 M 2
GLOBULIN SER CALC-MCNC: 2.6 G/DL (ref 1.9–3.5)
GLUCOSE SERPL-MCNC: 90 MG/DL (ref 65–99)
HCT VFR BLD AUTO: 46.1 % (ref 42–52)
HDLC SERPL-MCNC: 30 MG/DL
HGB BLD-MCNC: 15.4 G/DL (ref 14–18)
IMM GRANULOCYTES # BLD AUTO: 0.03 K/UL (ref 0–0.11)
IMM GRANULOCYTES NFR BLD AUTO: 0.4 % (ref 0–0.9)
LDLC SERPL CALC-MCNC: 57 MG/DL
LIPASE SERPL-CCNC: 29 U/L (ref 11–82)
LYMPHOCYTES # BLD AUTO: 1.68 K/UL (ref 1–4.8)
LYMPHOCYTES NFR BLD: 21.5 % (ref 22–41)
MCH RBC QN AUTO: 29.2 PG (ref 27–33)
MCHC RBC AUTO-ENTMCNC: 33.4 G/DL (ref 32.3–36.5)
MCV RBC AUTO: 87.3 FL (ref 81.4–97.8)
MONOCYTES # BLD AUTO: 0.63 K/UL (ref 0–0.85)
MONOCYTES NFR BLD AUTO: 8 % (ref 0–13.4)
NEUTROPHILS # BLD AUTO: 5.32 K/UL (ref 1.82–7.42)
NEUTROPHILS NFR BLD: 67.9 % (ref 44–72)
NRBC # BLD AUTO: 0 K/UL
NRBC BLD-RTO: 0 /100 WBC (ref 0–0.2)
PLATELET # BLD AUTO: 245 K/UL (ref 164–446)
PMV BLD AUTO: 11.7 FL (ref 9–12.9)
POTASSIUM SERPL-SCNC: 4.7 MMOL/L (ref 3.6–5.5)
PROT SERPL-MCNC: 7 G/DL (ref 6–8.2)
PSA SERPL DL<=0.01 NG/ML-MCNC: 0.62 NG/ML (ref 0–4)
RBC # BLD AUTO: 5.28 M/UL (ref 4.7–6.1)
SODIUM SERPL-SCNC: 141 MMOL/L (ref 135–145)
TRIGL SERPL-MCNC: 222 MG/DL (ref 0–149)
WBC # BLD AUTO: 7.8 K/UL (ref 4.8–10.8)

## 2025-08-04 PROCEDURE — 36415 COLL VENOUS BLD VENIPUNCTURE: CPT

## 2025-08-04 PROCEDURE — 80061 LIPID PANEL: CPT

## 2025-08-04 PROCEDURE — 83690 ASSAY OF LIPASE: CPT

## 2025-08-04 PROCEDURE — 85025 COMPLETE CBC W/AUTO DIFF WBC: CPT

## 2025-08-04 PROCEDURE — 84153 ASSAY OF PSA TOTAL: CPT

## 2025-08-04 PROCEDURE — 80053 COMPREHEN METABOLIC PANEL: CPT

## 2025-08-04 PROCEDURE — 82150 ASSAY OF AMYLASE: CPT

## 2025-08-13 PROCEDURE — 99283 EMERGENCY DEPT VISIT LOW MDM: CPT

## 2025-08-13 ASSESSMENT — FIBROSIS 4 INDEX: FIB4 SCORE: 0.86

## 2025-08-13 ASSESSMENT — LIFESTYLE VARIABLES
SKIP TO QUESTIONS 9-10: 1
HOW OFTEN DO YOU HAVE SIX OR MORE DRINKS ON ONE OCCASION: NEVER
HOW MANY STANDARD DRINKS CONTAINING ALCOHOL DO YOU HAVE ON A TYPICAL DAY: PATIENT DOES NOT DRINK
AUDIT-C TOTAL SCORE: 0
HOW OFTEN DO YOU HAVE A DRINK CONTAINING ALCOHOL: NEVER

## 2025-08-14 ENCOUNTER — HOSPITAL ENCOUNTER (EMERGENCY)
Facility: MEDICAL CENTER | Age: 55
End: 2025-08-14
Attending: EMERGENCY MEDICINE
Payer: COMMERCIAL

## 2025-08-14 ENCOUNTER — APPOINTMENT (OUTPATIENT)
Dept: RADIOLOGY | Facility: MEDICAL CENTER | Age: 55
End: 2025-08-14
Attending: EMERGENCY MEDICINE
Payer: COMMERCIAL

## 2025-08-14 VITALS
DIASTOLIC BLOOD PRESSURE: 98 MMHG | BODY MASS INDEX: 27.44 KG/M2 | HEART RATE: 79 BPM | OXYGEN SATURATION: 96 % | SYSTOLIC BLOOD PRESSURE: 150 MMHG | WEIGHT: 195.99 LBS | RESPIRATION RATE: 16 BRPM | HEIGHT: 71 IN | TEMPERATURE: 97 F

## 2025-08-14 DIAGNOSIS — K59.00 CONSTIPATION, UNSPECIFIED CONSTIPATION TYPE: Primary | ICD-10-CM

## 2025-08-14 DIAGNOSIS — R10.84 GENERALIZED ABDOMINAL PAIN: ICD-10-CM

## 2025-08-14 PROCEDURE — A9270 NON-COVERED ITEM OR SERVICE: HCPCS | Performed by: EMERGENCY MEDICINE

## 2025-08-14 PROCEDURE — 74018 RADEX ABDOMEN 1 VIEW: CPT

## 2025-08-14 PROCEDURE — 700102 HCHG RX REV CODE 250 W/ 637 OVERRIDE(OP): Performed by: EMERGENCY MEDICINE

## 2025-08-14 RX ADMIN — MAJOR MAGNESIUM CITRATE ORAL SOLUTION - LEMON 296 ML: 1.75 LIQUID ORAL at 03:30

## (undated) DEVICE — TOWEL STOP TIMEOUT SAFETY FLAG (40EA/CA)

## (undated) DEVICE — COVER LIGHT HANDLE FLEXIBLE - SOFT (2EA/PK 80PK/CA)

## (undated) DEVICE — GLOVE BIOGEL SZ 7.5 SURGICAL PF LTX - (50PR/BX 4BX/CA)

## (undated) DEVICE — DRAPE C-ARM LARGE 41IN X 74 IN - (10/BX 2BX/CA)

## (undated) DEVICE — SUCTION INSTRUMENT YANKAUER BULBOUS TIP W/O VENT (50EA/CA)

## (undated) DEVICE — DRAPE LAPAROTOMY T SHEET - (12EA/CA)

## (undated) DEVICE — ELECTRODE DUAL RETURN W/ CORD - (50/PK)

## (undated) DEVICE — BAG SPONGE COUNT 10.25 X 32 - BLUE (250/CA)

## (undated) DEVICE — TUBE CONNECTING SUCTION - CLEAR PLASTIC STERILE 72 IN (50EA/CA)

## (undated) DEVICE — SODIUM CHL IRRIGATION 0.9% 1000ML (12EA/CA)

## (undated) DEVICE — SENSOR OXIMETER ADULT SPO2 RD SET (20EA/BX)

## (undated) DEVICE — DERMABOND ADVANCED - (12EA/BX)

## (undated) DEVICE — GLOVE BIOGEL INDICATOR SZ 7.5 SURGICAL PF LTX - (50PR/BX 4BX/CA)

## (undated) DEVICE — WATER IRRIGATION STERILE 1000ML (12EA/CA)

## (undated) DEVICE — HUMID-VENT HEAT AND MOISTURE EXCHANGE- (50/BX)

## (undated) DEVICE — DRAPE STRLE REG TOWEL 18X24 - (10/BX 4BX/CA)"

## (undated) DEVICE — GOWN WARMING STANDARD FLEX - (30/CA)

## (undated) DEVICE — CANISTER SUCTION RIGID RED 1500CC (40EA/CA)

## (undated) DEVICE — GLOVE BIOGEL SZ 8 SURGICAL PF LTX - (50PR/BX 4BX/CA)

## (undated) DEVICE — LOCAL

## (undated) DEVICE — DRAPE C ARMOR (12EA/CA)

## (undated) DEVICE — NEEDLE SPINAL 22 GA X 3-1/2 - (25/BX)

## (undated) DEVICE — DRAPE ABDOMINAL STERILE LAPAROSCOPIC 102IN X 121IN 77IN (12EA/CA)